# Patient Record
Sex: FEMALE | Race: WHITE | NOT HISPANIC OR LATINO | Employment: FULL TIME | URBAN - METROPOLITAN AREA
[De-identification: names, ages, dates, MRNs, and addresses within clinical notes are randomized per-mention and may not be internally consistent; named-entity substitution may affect disease eponyms.]

---

## 2017-03-31 RX ORDER — NYSTATIN 100000 [USP'U]/G
POWDER TOPICAL 3 TIMES DAILY PRN
Qty: 30 G | Refills: 2 | Status: SHIPPED | OUTPATIENT
Start: 2017-03-31 | End: 2017-03-31

## 2017-03-31 RX ORDER — NYSTATIN 100000 [USP'U]/G
POWDER TOPICAL 3 TIMES DAILY PRN
Qty: 30 G | Refills: 0 | Status: SHIPPED | OUTPATIENT
Start: 2017-03-31 | End: 2019-11-11

## 2017-05-22 RX ORDER — AZITHROMYCIN 250 MG/1
TABLET, FILM COATED ORAL
Qty: 6 TABLET | Refills: 0 | Status: SHIPPED | OUTPATIENT
Start: 2017-05-22 | End: 2017-08-18

## 2017-07-06 ENCOUNTER — LAB (OUTPATIENT)
Dept: LAB | Facility: HOSPITAL | Age: 27
End: 2017-07-06

## 2017-07-06 DIAGNOSIS — N92.6 MISSED PERIOD: ICD-10-CM

## 2017-07-06 DIAGNOSIS — N92.6 MISSED PERIOD: Primary | ICD-10-CM

## 2017-07-06 LAB — HCG INTACT+B SERPL-ACNC: <5 MIU/ML

## 2017-07-06 PROCEDURE — 84702 CHORIONIC GONADOTROPIN TEST: CPT | Performed by: NURSE PRACTITIONER

## 2017-07-06 PROCEDURE — 36415 COLL VENOUS BLD VENIPUNCTURE: CPT

## 2017-08-08 RX ORDER — FLUCONAZOLE 150 MG/1
150 TABLET ORAL
Qty: 2 TABLET | Refills: 3 | Status: SHIPPED | OUTPATIENT
Start: 2017-08-08 | End: 2017-08-18

## 2017-08-18 ENCOUNTER — OFFICE VISIT (OUTPATIENT)
Dept: INTERNAL MEDICINE | Facility: CLINIC | Age: 27
End: 2017-08-18

## 2017-08-18 VITALS
SYSTOLIC BLOOD PRESSURE: 130 MMHG | DIASTOLIC BLOOD PRESSURE: 90 MMHG | BODY MASS INDEX: 41.66 KG/M2 | WEIGHT: 244 LBS | RESPIRATION RATE: 12 BRPM | HEART RATE: 88 BPM | HEIGHT: 64 IN

## 2017-08-18 DIAGNOSIS — G57.01 PIRIFORMIS SYNDROME OF RIGHT SIDE: Primary | ICD-10-CM

## 2017-08-18 DIAGNOSIS — E65 BUFFALO HUMP: ICD-10-CM

## 2017-08-18 DIAGNOSIS — L90.6 SKIN STRIAE: ICD-10-CM

## 2017-08-18 DIAGNOSIS — M46.1 SI (SACROILIAC) JOINT INFLAMMATION (HCC): ICD-10-CM

## 2017-08-18 DIAGNOSIS — E28.2 PCOS (POLYCYSTIC OVARIAN SYNDROME): ICD-10-CM

## 2017-08-18 PROCEDURE — 99214 OFFICE O/P EST MOD 30 MIN: CPT | Performed by: FAMILY MEDICINE

## 2017-08-18 RX ORDER — DEXAMETHASONE 1 MG
1 TABLET ORAL ONCE
Qty: 1 TABLET | Refills: 0 | Status: SHIPPED | OUTPATIENT
Start: 2017-08-18 | End: 2017-08-19

## 2017-08-21 ENCOUNTER — LAB (OUTPATIENT)
Dept: LAB | Facility: HOSPITAL | Age: 27
End: 2017-08-21

## 2017-08-21 DIAGNOSIS — E65 BUFFALO HUMP: ICD-10-CM

## 2017-08-21 DIAGNOSIS — L90.6 SKIN STRIAE: ICD-10-CM

## 2017-08-21 LAB — CORTIS AM PEAK SERPL-MCNC: 0.7 MCG/DL (ref 4.3–22.4)

## 2017-08-21 PROCEDURE — 82533 TOTAL CORTISOL: CPT | Performed by: FAMILY MEDICINE

## 2017-08-21 PROCEDURE — 36415 COLL VENOUS BLD VENIPUNCTURE: CPT

## 2017-09-06 ENCOUNTER — LAB (OUTPATIENT)
Dept: LAB | Facility: HOSPITAL | Age: 27
End: 2017-09-06

## 2017-09-06 DIAGNOSIS — E28.2 PCOS (POLYCYSTIC OVARIAN SYNDROME): ICD-10-CM

## 2017-09-06 LAB
25(OH)D3 SERPL-MCNC: 18.8 NG/ML
ALBUMIN SERPL-MCNC: 4.3 G/DL (ref 3.2–4.8)
ALBUMIN/GLOB SERPL: 1.2 G/DL (ref 1.5–2.5)
ALP SERPL-CCNC: 85 U/L (ref 25–100)
ALT SERPL W P-5'-P-CCNC: 27 U/L (ref 7–40)
ANION GAP SERPL CALCULATED.3IONS-SCNC: 6 MMOL/L (ref 3–11)
AST SERPL-CCNC: 18 U/L (ref 0–33)
BILIRUB SERPL-MCNC: 0.4 MG/DL (ref 0.3–1.2)
BUN BLD-MCNC: 14 MG/DL (ref 9–23)
BUN/CREAT SERPL: 23.3 (ref 7–25)
CALCIUM SPEC-SCNC: 9.7 MG/DL (ref 8.7–10.4)
CHLORIDE SERPL-SCNC: 102 MMOL/L (ref 99–109)
CO2 SERPL-SCNC: 29 MMOL/L (ref 20–31)
CREAT BLD-MCNC: 0.6 MG/DL (ref 0.6–1.3)
GFR SERPL CREATININE-BSD FRML MDRD: 121 ML/MIN/1.73
GLOBULIN UR ELPH-MCNC: 3.5 GM/DL
GLUCOSE BLD-MCNC: 82 MG/DL (ref 70–100)
HBA1C MFR BLD: 5.2 % (ref 4.8–5.6)
POTASSIUM BLD-SCNC: 5 MMOL/L (ref 3.5–5.5)
PROLACTIN SERPL-MCNC: 6.6 NG/ML
PROT SERPL-MCNC: 7.8 G/DL (ref 5.7–8.2)
SODIUM BLD-SCNC: 137 MMOL/L (ref 132–146)
T4 SERPL-MCNC: 10.2 MCG/DL (ref 4.7–11.4)
TESTOST SERPL-MCNC: 75.44 NG/DL
TSH SERPL DL<=0.05 MIU/L-ACNC: 2.47 MIU/ML (ref 0.35–5.35)
VIT B12 BLD-MCNC: 313 PG/ML (ref 211–911)

## 2017-09-06 PROCEDURE — 80053 COMPREHEN METABOLIC PANEL: CPT | Performed by: FAMILY MEDICINE

## 2017-09-06 PROCEDURE — 84146 ASSAY OF PROLACTIN: CPT | Performed by: FAMILY MEDICINE

## 2017-09-06 PROCEDURE — 83036 HEMOGLOBIN GLYCOSYLATED A1C: CPT | Performed by: FAMILY MEDICINE

## 2017-09-06 PROCEDURE — 82607 VITAMIN B-12: CPT | Performed by: FAMILY MEDICINE

## 2017-09-06 PROCEDURE — 84403 ASSAY OF TOTAL TESTOSTERONE: CPT | Performed by: FAMILY MEDICINE

## 2017-09-06 PROCEDURE — 84481 FREE ASSAY (FT-3): CPT | Performed by: FAMILY MEDICINE

## 2017-09-06 PROCEDURE — 84436 ASSAY OF TOTAL THYROXINE: CPT | Performed by: FAMILY MEDICINE

## 2017-09-06 PROCEDURE — 82306 VITAMIN D 25 HYDROXY: CPT | Performed by: FAMILY MEDICINE

## 2017-09-06 PROCEDURE — 84443 ASSAY THYROID STIM HORMONE: CPT | Performed by: FAMILY MEDICINE

## 2017-09-06 PROCEDURE — 36415 COLL VENOUS BLD VENIPUNCTURE: CPT

## 2017-09-07 LAB — T3FREE SERPL-MCNC: 3.4 PG/ML (ref 2–4.4)

## 2017-09-07 RX ORDER — ERGOCALCIFEROL 1.25 MG/1
50000 CAPSULE ORAL WEEKLY
Qty: 4 CAPSULE | Refills: 3 | Status: SHIPPED | OUTPATIENT
Start: 2017-09-07 | End: 2018-08-06 | Stop reason: SDUPTHER

## 2017-09-15 RX ORDER — ONDANSETRON 4 MG/1
4 TABLET, FILM COATED ORAL EVERY 6 HOURS PRN
Qty: 20 TABLET | Refills: 5 | Status: SHIPPED | OUTPATIENT
Start: 2017-09-15 | End: 2019-03-26 | Stop reason: SDUPTHER

## 2017-10-18 DIAGNOSIS — E28.2 PCOS (POLYCYSTIC OVARIAN SYNDROME): Primary | ICD-10-CM

## 2017-12-05 RX ORDER — ALBUTEROL SULFATE 90 UG/1
2 AEROSOL, METERED RESPIRATORY (INHALATION) EVERY 4 HOURS PRN
Qty: 18 G | Refills: 2 | Status: SHIPPED | OUTPATIENT
Start: 2017-12-05 | End: 2020-03-09

## 2017-12-05 RX ORDER — ALBUTEROL SULFATE 90 UG/1
2 AEROSOL, METERED RESPIRATORY (INHALATION) EVERY 4 HOURS PRN
Qty: 18 G | Refills: 2 | Status: SHIPPED | OUTPATIENT
Start: 2017-12-05 | End: 2017-12-05

## 2017-12-12 RX ORDER — OSELTAMIVIR PHOSPHATE 75 MG/1
75 CAPSULE ORAL 2 TIMES DAILY
Qty: 10 CAPSULE | Refills: 0 | Status: SHIPPED | OUTPATIENT
Start: 2017-12-12 | End: 2018-03-02

## 2018-01-11 RX ORDER — DESOGESTREL AND ETHINYL ESTRADIOL 0.15-0.03
1 KIT ORAL DAILY
Qty: 28 TABLET | Refills: 12 | Status: SHIPPED | OUTPATIENT
Start: 2018-01-11 | End: 2018-03-02 | Stop reason: SDDI

## 2018-03-02 ENCOUNTER — OFFICE VISIT (OUTPATIENT)
Dept: OBSTETRICS AND GYNECOLOGY | Facility: CLINIC | Age: 28
End: 2018-03-02

## 2018-03-02 ENCOUNTER — OFFICE VISIT (OUTPATIENT)
Dept: INTERNAL MEDICINE | Facility: CLINIC | Age: 28
End: 2018-03-02

## 2018-03-02 ENCOUNTER — LAB (OUTPATIENT)
Dept: LAB | Facility: HOSPITAL | Age: 28
End: 2018-03-02

## 2018-03-02 VITALS
OXYGEN SATURATION: 96 % | BODY MASS INDEX: 41.59 KG/M2 | SYSTOLIC BLOOD PRESSURE: 112 MMHG | HEART RATE: 93 BPM | DIASTOLIC BLOOD PRESSURE: 80 MMHG | HEIGHT: 64 IN | WEIGHT: 243.6 LBS | RESPIRATION RATE: 12 BRPM

## 2018-03-02 VITALS
HEART RATE: 86 BPM | DIASTOLIC BLOOD PRESSURE: 74 MMHG | BODY MASS INDEX: 41.48 KG/M2 | HEIGHT: 64 IN | SYSTOLIC BLOOD PRESSURE: 112 MMHG | WEIGHT: 243 LBS | RESPIRATION RATE: 14 BRPM | OXYGEN SATURATION: 98 %

## 2018-03-02 DIAGNOSIS — E28.2 PCOS (POLYCYSTIC OVARIAN SYNDROME): ICD-10-CM

## 2018-03-02 DIAGNOSIS — Z01.419 WELL WOMAN EXAM WITH ROUTINE GYNECOLOGICAL EXAM: Primary | ICD-10-CM

## 2018-03-02 DIAGNOSIS — R79.89 ELEVATED TESTOSTERONE LEVEL IN FEMALE: ICD-10-CM

## 2018-03-02 DIAGNOSIS — E28.2 PCOS (POLYCYSTIC OVARIAN SYNDROME): Primary | ICD-10-CM

## 2018-03-02 DIAGNOSIS — M54.17 LUMBOSACRAL RADICULOPATHY AT L4: ICD-10-CM

## 2018-03-02 DIAGNOSIS — R10.32 LLQ PAIN: ICD-10-CM

## 2018-03-02 LAB
ESTRADIOL SERPL HS-MCNC: 93 PG/ML
FSH SERPL-ACNC: 3 MIU/ML
HCG INTACT+B SERPL-ACNC: <5 MIU/ML
LH SERPL-ACNC: 7.5 MIU/ML
PROLACTIN SERPL-MCNC: 6.7 NG/ML

## 2018-03-02 PROCEDURE — 84146 ASSAY OF PROLACTIN: CPT

## 2018-03-02 PROCEDURE — 82670 ASSAY OF TOTAL ESTRADIOL: CPT

## 2018-03-02 PROCEDURE — 82627 DEHYDROEPIANDROSTERONE: CPT

## 2018-03-02 PROCEDURE — 36415 COLL VENOUS BLD VENIPUNCTURE: CPT

## 2018-03-02 PROCEDURE — 83001 ASSAY OF GONADOTROPIN (FSH): CPT

## 2018-03-02 PROCEDURE — 99395 PREV VISIT EST AGE 18-39: CPT | Performed by: NURSE PRACTITIONER

## 2018-03-02 PROCEDURE — 99213 OFFICE O/P EST LOW 20 MIN: CPT | Performed by: FAMILY MEDICINE

## 2018-03-02 PROCEDURE — 84702 CHORIONIC GONADOTROPIN TEST: CPT

## 2018-03-02 PROCEDURE — 84403 ASSAY OF TOTAL TESTOSTERONE: CPT

## 2018-03-02 PROCEDURE — 84402 ASSAY OF FREE TESTOSTERONE: CPT

## 2018-03-02 PROCEDURE — 83002 ASSAY OF GONADOTROPIN (LH): CPT

## 2018-03-02 RX ORDER — SPIRONOLACTONE 50 MG/1
TABLET, FILM COATED ORAL
Qty: 30 TABLET | Refills: 2 | Status: SHIPPED | OUTPATIENT
Start: 2018-03-02 | End: 2019-01-23 | Stop reason: SDUPTHER

## 2018-03-02 RX ORDER — METFORMIN HYDROCHLORIDE 500 MG/1
1000 TABLET, EXTENDED RELEASE ORAL
Qty: 180 TABLET | Refills: 1 | Status: SHIPPED | OUTPATIENT
Start: 2018-03-02 | End: 2019-03-08 | Stop reason: SDUPTHER

## 2018-03-02 NOTE — PROGRESS NOTES
Follow up visit. Still having nerve pain in legs.   Follow up cortisol.     SUBJECTIVE: Taylor Rivas is a 27 y.o. female seen for a follow up visit;    Back pain: worsening, not improved w/ stretches, chiropractor.  She is still having the back pain, but now the pain is more specifically running down legs.  Pain runs down outside of thighs, across top of thighs to inner calfs.  She is also having numbness and tingling of her big toes.     PCOS: not improved, couldn't tolerate the short acting metformin.         The following portions of the patient's history were reviewed and updated as appropriate: She  has a past medical history of Chronic cholecystitis and PCOS (polycystic ovarian syndrome).  She has PCOS (polycystic ovarian syndrome) on her pertinent problem list.  She  has a past surgical history that includes Laparoscopic cholecystectomy and [place] tooth extraction.  Her family history includes Bone cancer (age of onset: 50) in her paternal aunt; Breast cancer in her other; Heart valve disorder in her father; Hyperlipidemia in her maternal grandfather; Hypertension in her maternal grandmother; Lymphoma (age of onset: 65) in her paternal grandfather; No Known Problems in her brother and paternal grandmother; Stomach cancer in her other; Stroke in her maternal grandmother; Thyroid disease in her mother.  She  reports that she quit smoking about 4 years ago. Her smoking use included Cigarettes. She started smoking about 10 years ago. She has a 6.00 pack-year smoking history. She has never used smokeless tobacco. She reports that she drinks alcohol. She reports that she does not use drugs.  She has a current medication list which includes the following prescription(s): albuterol, nystatin, ondansetron, vitamin d, medroxyprogesterone, metformin er, and spironolactone..    Review of Systems   Constitutional: Positive for fatigue.   Respiratory: Negative.    Cardiovascular: Negative.    Musculoskeletal: Positive  "for back pain.   Neurological: Positive for numbness.        B//outer big toe numbness, inner left thigh numbness, pain shooting down back of legs   Psychiatric/Behavioral: Negative.          OBJECTIVE:  /80   Pulse 93   Resp 12   Ht 162.6 cm (64\")   Wt 110 kg (243 lb 9.6 oz)   LMP 01/04/2018 (Exact Date)   SpO2 96%   BMI 41.81 kg/m²      Physical Exam   Constitutional: She appears well-developed and well-nourished. No distress.        Neurological: She exhibits normal muscle tone.   Reflex Scores:       Patellar reflexes are 2+ on the right side and 2+ on the left side.       Achilles reflexes are 2+ on the right side and 2+ on the left side.          ASSESSMENT:   Diagnosis Plan   1. PCOS (polycystic ovarian syndrome)  metFORMIN ER (GLUCOPHAGE-XR) 500 MG 24 hr tablet    spironolactone (ALDACTONE) 50 MG tablet   2. Elevated testosterone level in female  spironolactone (ALDACTONE) 50 MG tablet   3. Lumbosacral radiculopathy at L4  MRI Lumbar Spine Without Contrast     Medications Discontinued During This Encounter   Medication Reason   • metFORMIN (GLUCOPHAGE) 500 MG tablet                I, Belem Yanez MD, hereby attest that the medical record entry above accurately reflects signatures/notations that I made in my capacity as Belem Yanez MD when I treated and diagnosed the above patient.  I do hereby attest that his information is true, accurate, and complete to the best of my knowledge and I understand that any falsification, omission, or concealment of material fact may subject me to administrative, civil, or criminal liability.                  "

## 2018-03-03 LAB — DHEA-S SERPL-MCNC: 202.1 UG/DL (ref 84.8–378)

## 2018-03-04 LAB
TESTOST FREE SERPL-MCNC: 3.6 PG/ML (ref 0–4.2)
TESTOST SERPL-MCNC: 80 NG/DL (ref 8–48)

## 2018-03-09 NOTE — PROGRESS NOTES
[facility] ANNUAL WELL WOMAN VISIT      Taylor Rivas  [id number]  1990      Chief Complaint: Gynecologic Exam (Lower abdominal pain, very tender)        History of present illness:    Taylor Rivas, is a 27 y.o. year old  presenting to be seen for her annual exam. She has known PCOS, previously managed with OCPs, spironolactone, and metformin. She was on only OCPs for the last 1 year. She reports discontinuing her OCPs in 2017 as she is desiring of pregnancy. She has resumed her Metformin and see her PCP this afternoon to discuss possibly resuming her spironolactone. She has not has a menstrual bleed since stopping her OCPs. She has used cyclic progesterone in the past and is considering returning to this therapy to induce menses. All home UPTs have been negative. She currently has bothersome cystic acne and c/o difficulty losing weight without medication.   Additionally, Alyssa reports lower abdominal tenderness that has been present x 1-2 weeks. She has intermittent generalized lower abdominal pain and intercourse is painful in certain positions. Denies dysuria, hematuria, or fevers. She is s/p cholecystectomy.     SEXUAL Hx:  She is currently sexually active.  In the past year there has not been new sexual partners.    Condoms are not typically used.  She would not like to be screened for STD's at today's exam.  Current birth control method: not using any form of contraception because she is currently trying to conceive.  She is happy with her current method of contraception and does not want to discuss alternative methods of contraception.  MENSTRUAL Hx:  Patient's last menstrual period was 2018 (exact date). No menses since discontinuing OCPs.   In the past 6 months her cycles have been regular, absent off of hormone management.   Her menstrual flow has been absent.   Each month on average there are roughly 0 days of very heavy flow.    Intermenstrual bleeding is  absent.    Post-coital bleeding is absent.  Dysmenorrhea: is not affecting her activities of daily living  PMS: is not affecting her activities of daily living  Her cycles ARE a source of concern for her that she wishes to discuss today.  HEALTH Hx:  She exercises regularly: no (but is planning to start exercising more ).  She wears her seat belt:yes.  She has concerns about domestic violence: no.  She is a non-smoker.      Past Medical History:   Diagnosis Date   • Chronic cholecystitis    • PCOS (polycystic ovarian syndrome)        Past Surgical History:   Procedure Laterality Date   • LAPAROSCOPIC CHOLECYSTECTOMY     • WISDOM TOOTH EXTRACTION         MEDICATIONS: The current medication list was reviewed and reconciled.     Allergies:  has No Known Allergies.    Family History   Problem Relation Age of Onset   • Thyroid disease Mother    • Hypertension Maternal Grandmother    • Stroke Maternal Grandmother    • Lymphoma Paternal Grandfather 65     [place] Lymphoma   • Breast cancer Other    • Stomach cancer Other    • Heart valve disorder Father    • No Known Problems Brother    • Bone cancer Paternal Aunt 50     osteosarcoma?   • Hyperlipidemia Maternal Grandfather    • No Known Problems Paternal Grandmother        Health Maintenance:  Last pap smear was 12/2016, results were  normal PAP.. She  does not have a history of abnormal pap smears.    Review of Systems   Constitutional: Negative for fatigue, fever and unexpected weight change.   HENT: Negative for congestion, ear pain, hearing loss, sinus pressure and trouble swallowing.    Eyes: Negative for visual disturbance.   Respiratory: Negative for cough, chest tightness, shortness of breath and wheezing.    Cardiovascular: Negative for chest pain, palpitations and leg swelling.   Gastrointestinal: Positive for abdominal pain (low). Negative for abdominal distention, constipation, diarrhea, nausea and vomiting.   Endocrine: Negative for cold intolerance, heat  "intolerance, polydipsia, polyphagia and polyuria.   Genitourinary: Positive for dyspareunia. Negative for difficulty urinating, dysuria, frequency, hematuria, pelvic pain, urgency, vaginal bleeding, vaginal discharge and vaginal pain.   Musculoskeletal: Negative for arthralgias, gait problem, joint swelling and myalgias.   Skin: Negative for color change, pallor and rash.   Neurological: Negative for dizziness, seizures, syncope, weakness, light-headedness, numbness and headaches.   Hematological: Negative for adenopathy. Does not bruise/bleed easily.   Psychiatric/Behavioral: Negative for agitation, confusion, sleep disturbance and suicidal ideas. The patient is not nervous/anxious.        Physical Exam  Vital Signs: /74  Pulse 86  Resp 14  Ht 162.6 cm (64\")  Wt 110 kg (243 lb)  LMP 01/04/2018 (Exact Date)  SpO2 98%  Breastfeeding? No  BMI 41.71 kg/m2   General Appearance:  alert, cooperative, no apparent distress, appears stated age and obese   Neurologic/Psychiatric: A&O x 3, gait steady, appropriate affect   HEENT:  Normocephalic, without obvious abnormality, mucous membranes moist   Neck: Supple, symmetrical, trachea midline, no adenopathy;  No thyromegaly, masses, or tenderness   Back:   Symmetric, no curvature, ROM normal, no CVA tenderness   Lungs:   Clear to auscultation bilaterally; respirations regular, even, and unlabored bilaterally   Heart:  Regular rate and rhythm, no murmurs appreciated   Breasts:  Symmetrical, no masses, no lesions and no nipple discharge   Abdomen:   Soft, non-tender, no organomegaly, tenderness in RLQ and LLQ and Exam limited d/t habitus.   Lymph nodes: No cervical, supraclavicular, inguinal or axillary adenopathy noted   Extremities: Normal, atraumatic; no clubbing, cyanosis, or edema    Skin: No rashes, ulcers, or suspicious lesions noted   Pelvic: External Genitalia  without lesions or skin changes  Vagina  is pink, moist, without lesions.   Cervix  normal, " without lesions, no discharge, no CMT and pap smear obtained  Uterus  normal size, midposition, mobile and nontender  Ovaries  without palpable masses or fullness  Parametria  smooth  Rectovaginal  Female rectovaginal: deferred       Procedure Note:  No notes on file    Assessment and Plan:    Taylor was seen today for gynecologic exam.    Diagnoses and all orders for this visit:    Well woman exam with routine gynecological exam  -     Pap IG, Rfx HPV ASCU; Future  -     Pap IG, Rfx HPV ASCU    PCOS (polycystic ovarian syndrome)  -     Testosterone, Free, Total; Future  -     Follicle Stimulating Hormone; Future  -     Estradiol; Future  -     Prolactin; Future  -     Luteinizing Hormone; Future  -     hCG, Quantitative, Pregnancy; Future  -     DHEA-Sulfate; Future    LLQ pain  -     US Non-ob Transvaginal; Future        Pap was done today.  If she does not receive the results of the Pap within 2 weeks  time, she was instructed to call to find out the results.  I explained to Taylor that the recommendations for Pap smear interval in a low risk patient's has lengthened to 3 years time.  I encouraged her to be seen yearly for a full physical exam including breast and pelvic exam even during the off years when PAP's will not be performed.    She was recommended to begin mammograms at age 40 for breast cancer screening, colonoscopy at age 50 for colon cancer screening and bone density scans (DEXA) at age 60-65 for osteoporosis screening.    Alyssa and I discussed her h/o PCOS and desiring fertility. She has not has a period since stopping her OCPs and has had to use hormonal managements to induce menses in the past. I fear she is likely anovulatory and may need ongoing close PCOS management and possible higher level fertility care in the future. We will assess blood work now and discuss next steps. She was encouraged to continue Metformin and work toward weight loss. She has been successful in the past with  diet and exercise, reports desire to work toward long-term weight loss.     Pelvic exam WNL today, but low generalized abdominal tenderness noted consistent with her report of low abd pain. We will obtain TVUS at her convenience in 1-2 weeks and I will notify her of all results.       Follow-up will be determined pending u/s and blood work results.       MACIEJ Escamilla      Note: Speech recognition transcription software was used to dictate portions of this document.  An attempt at proofreading has been made though minor errors in transcription may still be present.  Please do not hesitate to call our office with any questions.

## 2018-03-15 RX ORDER — MEDROXYPROGESTERONE ACETATE 10 MG/1
10 TABLET ORAL DAILY
Qty: 10 TABLET | Refills: 12 | Status: SHIPPED | OUTPATIENT
Start: 2018-03-15 | End: 2019-03-26 | Stop reason: SDUPTHER

## 2018-03-15 NOTE — TELEPHONE ENCOUNTER
Notified of TVUS results. Discussed plan for cyclic medroxyprogesterone to encourage monthly withdrawal bleeding while she is using her metformin, spironolactone, and working toward weight loss. Pt aware of medication instructions. We reviewed medication instructions, risks, benefits, and potential side effects. Rx sent to pharmacy.

## 2018-03-29 ENCOUNTER — HOSPITAL ENCOUNTER (OUTPATIENT)
Dept: MRI IMAGING | Facility: HOSPITAL | Age: 28
Discharge: HOME | End: 2018-03-29
Admitting: FAMILY MEDICINE

## 2018-03-29 DIAGNOSIS — M54.17 LUMBOSACRAL RADICULOPATHY AT L4: ICD-10-CM

## 2018-03-29 PROCEDURE — 72148 MRI LUMBAR SPINE W/O DYE: CPT

## 2018-03-30 ENCOUNTER — TELEPHONE (OUTPATIENT)
Dept: INTERNAL MEDICINE | Facility: CLINIC | Age: 28
End: 2018-03-30

## 2018-03-30 DIAGNOSIS — M54.17 LUMBOSACRAL RADICULOPATHY AT S1: ICD-10-CM

## 2018-03-30 DIAGNOSIS — M51.27 HERNIATED NUCLEUS PULPOSUS, L5-S1: ICD-10-CM

## 2018-03-30 DIAGNOSIS — S34.22XA INJURY OF SPINAL NERVE ROOT AT S1 LEVEL, INITIAL ENCOUNTER: Primary | ICD-10-CM

## 2018-03-30 RX ORDER — GABAPENTIN 800 MG/1
TABLET ORAL
Qty: 60 TABLET | Refills: 1 | OUTPATIENT
Start: 2018-03-30 | End: 2018-06-07

## 2018-03-30 NOTE — TELEPHONE ENCOUNTER
Discussed MRI results w/ patient - will refer to [facility] per patient preference. Also discussed worsening pain - calling in gabapentin to take as needed.

## 2018-04-24 ENCOUNTER — OFFICE VISIT (OUTPATIENT)
Dept: NEUROSURGERY | Facility: CLINIC | Age: 28
End: 2018-04-24

## 2018-04-24 VITALS — HEIGHT: 64 IN

## 2018-04-24 DIAGNOSIS — M51.26 HNP (HERNIATED NUCLEUS PULPOSUS), LUMBAR: Primary | ICD-10-CM

## 2018-04-24 PROCEDURE — 99243 OFF/OP CNSLTJ NEW/EST LOW 30: CPT | Performed by: NEUROLOGICAL SURGERY

## 2018-04-24 NOTE — PROGRESS NOTES
Subjective   Patient ID: Taylor Rivas is a 27 y.o. female is being seen for consultation today at the request of No ref. 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  She has a history of back pain for 6-8 months with pain or numbness radiating to her legs at times.  Gabapentin seems to help with symptoms.  Sitting for long periods aggravates the symptoms.  She has had no therapy up until this point.  She is continued to work.  She has no symptoms referable to bladder or bowel, or sacral sensory distribution and saddle region.    Review of Radiographic Studies:  Lumbar MRI scan dated 3/20/1918 shows a central disc herniation at L5-S1 which was moderately large in size and somewhat compresses the S1 nerve roots on each side, but without an extremely significant compression or lateral recess stenosis.    The following portions of the patient's history were reviewed, updated as appropriate and approved: allergies, current medications, past family history, past medical history, past social history, past surgical history, review of systems and problem list.  Review of Systems   Constitutional: Negative for activity change, appetite change, chills, diaphoresis, fatigue, fever and unexpected weight change.   HENT: Negative for congestion, dental problem, drooling, ear discharge, ear pain, facial swelling, hearing loss, mouth sores, nosebleeds, postnasal drip, rhinorrhea, sinus pressure, sneezing, sore throat, tinnitus, trouble swallowing and voice change.    Eyes: Negative for photophobia, pain, discharge, redness, itching and visual disturbance.   Respiratory: Negative for apnea, cough, choking, chest tightness, shortness of breath, wheezing and stridor.    Cardiovascular: Negative for chest pain, palpitations and leg swelling.   Gastrointestinal: Negative  for abdominal distention, abdominal pain, anal bleeding, blood in stool, constipation, diarrhea, nausea, rectal pain and vomiting.   Endocrine: Negative for cold intolerance, heat intolerance, polydipsia, polyphagia and polyuria.   Genitourinary: Negative for decreased urine volume, difficulty urinating, dysuria, enuresis, flank pain, frequency, genital sores, hematuria and urgency.   Musculoskeletal: Positive for back pain. Negative for arthralgias, gait problem, joint swelling, myalgias, neck pain and neck stiffness.   Skin: Negative for color change, pallor, rash and wound.   Allergic/Immunologic: Negative for environmental allergies, food allergies and immunocompromised state.   Neurological: Positive for weakness and numbness. Negative for dizziness, tremors, seizures, syncope, facial asymmetry, speech difficulty, light-headedness and headaches.   Hematological: Negative for adenopathy. Does not bruise/bleed easily.   Psychiatric/Behavioral: Negative for agitation, behavioral problems, confusion, decreased concentration, dysphoric mood, hallucinations, self-injury, sleep disturbance and suicidal ideas. The patient is not nervous/anxious and is not hyperactive.        Objective     NEUROLOGICAL EXAMINATION:      MENTAL STATUS:  Alert and oriented.  Speech intact.  Recent and remote memory intact.      CRANIAL NERVES:  Cranial nerve II:  Visual fields are full.  Cranial nerves III, IV and VI:  PERRLADC.  Extraocular movements are intact.  Nystagmus is not present.  Cranial nerve V:  Facial sensation is intact.  Cranial nerve VII:  Muscles of facial expression reveal no asymmetry.  Cranial nerve VIII:  Hearing is intact.  Cranial nerves IX and X:  Palate elevates symmetrically.  Cranial nerve XI:  Shoulder shrug is intact.  Cranial nerve XII:  Tongue is midline without evidence of atrophy or fasciculation.    MUSCULOSKELETAL:  SLR negative. Shukri's test negative.    MOTOR:  Deltoid, biceps, triceps, and   strength intact.  No hand atrophy.  Hip flexion, knee extension, ankle dorsiflexion and plantar flexion intact. No tremor, spasticity, ataxia, or dysmetria.    SENSATION: Intact to touch upper and lower extremities.  Position sense intact.    REFLEXES:  DTR Intact and symmetrical in upper and lower extremities. Negative Babinski bilaterally    Assessment   Central lumbar disc herniation L5-S1 with mild bilateral radiculopathy.       Plan   Physical therapy would probably be the best choice.  There is no compelling reason to require surgery at this time, and it is uncertain that surgical removal of the herniated central fragment would relieve the pain symptoms in the back.  Recommend physical therapy and follow-up in [place] in 2 months.       Saúl Burgos MD

## 2018-05-04 RX ORDER — AMOXICILLIN AND CLAVULANATE POTASSIUM 875; 125 MG/1; MG/1
1 TABLET, FILM COATED ORAL EVERY 12 HOURS SCHEDULED
Qty: 14 TABLET | Refills: 0 | Status: SHIPPED | OUTPATIENT
Start: 2018-05-04 | End: 2018-11-09

## 2018-05-07 ENCOUNTER — HOSPITAL ENCOUNTER (OUTPATIENT)
Dept: PHYSICAL THERAPY | Facility: HOSPITAL | Age: 28
Setting detail: THERAPIES SERIES
Discharge: HOME | End: 2018-05-07

## 2018-05-07 DIAGNOSIS — M51.26 HNP (HERNIATED NUCLEUS PULPOSUS), LUMBAR: ICD-10-CM

## 2018-05-07 DIAGNOSIS — G89.29 CHRONIC BILATERAL LOW BACK PAIN WITH BILATERAL SCIATICA: Primary | ICD-10-CM

## 2018-05-07 DIAGNOSIS — M54.41 CHRONIC BILATERAL LOW BACK PAIN WITH BILATERAL SCIATICA: Primary | ICD-10-CM

## 2018-05-07 DIAGNOSIS — M54.42 CHRONIC BILATERAL LOW BACK PAIN WITH BILATERAL SCIATICA: Primary | ICD-10-CM

## 2018-05-07 PROCEDURE — 97161 PT EVAL LOW COMPLEX 20 MIN: CPT

## 2018-05-07 NOTE — THERAPY EVALUATION
Outpatient Physical Therapy Ortho Initial Evaluation  [facility]     Patient Name: Taylor Rivas  : 1990  MRN: [id number]  Today's Date: 2018      Visit Date: 2018    Patient Active Problem List   Diagnosis   • PCOS (polycystic ovarian syndrome)   • HNP (herniated nucleus pulposus), lumbar L5-S1        Past Medical History:   Diagnosis Date   • Chronic cholecystitis    • PCOS (polycystic ovarian syndrome)         Past Surgical History:   Procedure Laterality Date   • LAPAROSCOPIC CHOLECYSTECTOMY     • WISDOM TOOTH EXTRACTION         Visit Dx:     ICD-10-CM ICD-9-CM   1. Chronic bilateral low back pain with bilateral sciatica M54.42 724.2    M54.41 724.3    G89.29 338.29   2. HNP (herniated nucleus pulposus), lumbar L5-S1 M51.26 722.10             Patient History     Row Name 18 0730             History    Chief Complaint Difficulty with daily activities;Numbness;Pain;Muscle tenderness;Joint stiffness;Tinglings  -MM      Type of Pain Back pain  -MM      Date Current Problem(s) Began 17  -MM      Brief Description of Current Complaint Client presents with back pain and radicular pain into her legs.  Symptoms reportedly started 6-8 months ago after a 12 hour trip to [place].  She notices back stiffness and pain in the right leg. She experiences constant numbness in her left leg. she has numbness in both great toes.   -MM      Patient/Caregiver Goals Relieve pain;Improve mobility;Improve strength;Return to prior level of function  -MM      Occupation/sports/leisure activities [facility]  -MM      What clinical tests have you had for this problem? MRI  -MM      Results of Clinical Tests central disc herniation L5/S1  -MM      Are you or can you be pregnant No  -MM         Pain     Pain Location Back;Leg  -MM      Pain at Present 2  -MM      Pain at Best 2  -MM      Pain at Worst 6;7  -MM      Pain Frequency Constant/continuous  -MM      Pain  Description Sharp;Shooting;Cramping  -MM      Pain Comments Pain is worse with prolonged sitting. she also notices sharp pain often when transferring sit to stand.   -MM      Is your sleep disturbed? No  -MM      Difficulties with ADL's? prolonged sitting, transfers sit to stand, stairs, picking up items, carrying items, dressing  -MM         Fall Risk Assessment    Any falls in the past year: No  -MM         Daily Activities    Primary Language English  -MM      Are you able to read Yes  -MM      Are you able to write Yes  -MM      How does patient learn best? Listening;Reading;Demonstration  -MM      Barriers to learning None  -MM      Pt Participated in POC and Goals Yes  -MM         Safety    Are you being hurt, hit, or frightened by anyone at home or in your life? No  -MM      Are you being neglected by a caregiver No  -MM        User Key  (r) = Recorded By, (t) = Taken By, (c) = Cosigned By    Initials Name Provider Type    MM Patel Lopez, PT Physical Therapist                PT Ortho     Row Name 05/07/18 5120       Precautions and Contraindications    Precautions MRI consistent with central disc herniation L5/S1  -MM       Posture/Observations    Posture/Observations Comments No signs of scoliosis. Pelvic alignment is symmetrical.  Palpation: Marked tenderness central lumbosacral region, superior gluteal region bilateral and right gluteal region and right hamstrings.  -MM       Neural Tension Signs- Lower Quarter Clearing    SLR Bilateral:;Positive  -MM       Lumbar ROM Screen- Lower Quarter Clearing    Lumbar Flexion Impaired   75% with some increased pain in the right leg and back pain  -MM    Lumbar Extension Impaired   33° with central pain on the left  -MM    Lumbar Lateral Flexion Impaired   Right: 25° with pain; left 28°  -MM    Lumbar Rotation Impaired   52° bilateral  -MM    Lumbar Quadrant  Impaired   Pain bilateral; left greater than right  -MM       General ROM    GENERAL ROM COMMENTS Client  has some difficulty lying supine.  Some increased pain with lower trunk rotation.  Client tolerates bridging okay.  Client is increased pain with single knee to chest.  She tolerates propped on elbows and press ups well.  Directional preference for extension.  -MM       General Assessment (Manual Muscle Testing)    Comment, General Manual Muscle Testing (MMT) Assessment Bilateral knee strength is within normal limits.  Bilateral ankle strength is within normal limits.  Bilateral hip strength is within normal limits except hip extension is 4+/5 bilateral  -MM      User Key  (r) = Recorded By, (t) = Taken By, (c) = Cosigned By    Initials Name Provider Type    LAWANDA Lopez, PT Physical Therapist      \          PT OP Goals     Row Name 05/07/18 0730          PT Short Term Goals    STG Date to Achieve 05/28/18  -MM     STG 1 Client will report 80% improvement in low back pain.  -MM     STG 1 Progress New  -MM     STG 2 Radicular right leg pain will resolve.  -MM     STG 2 Progress New  -MM     STG 3 Lower lumbar tenderness will resolve.  -MM     STG 3 Progress New  -MM     STG 4 Client will be independent with home program to minimize pain and improve overall mobility and function.  -MM     STG 4 Progress New  -MM        Long Term Goals    LTG Date to Achieve 06/04/18  -MM     LTG 1 Modified Oswestry score will improve to 16% or better.  -MM     LTG 1 Progress New  -MM     LTG 2 Client will improve to picking up light items from the floor without difficulty.  -MM     LTG 2 Progress New  -MM     LTG 3 Bilateral hip extension strength will improve to 5/5.  -MM     LTG 3 Progress New  -MM        Time Calculation    PT Goal Re-Cert Due Date 08/05/18  -MM       User Key  (r) = Recorded By, (t) = Taken By, (c) = Cosigned By    Initials Name Provider Type    LAWANDA Lopez, PT Physical Therapist                PT Assessment/Plan     Row Name 05/07/18 0730          PT Assessment    Functional Limitations Limitation  in home management;Limitations in community activities;Limitations in functional capacity and performance;Performance in leisure activities;Performance in self-care ADL  -MM     Impairments Pain;Muscle strength;Range of motion  -MM     Assessment Comments Client presented with evolving symptoms of low complexity.  Signs and symptoms are consistent with referring diagnosis of low back pain related to herniated disc.  Client has a directional preference for flexion.  She does have pain in both directions.  She should respond well to mobility exercises in small ranges and low level stabilization.  Skilled intervention is required to minimize pain and improve overall mobility.  -MM     Please refer to paper survey for additional self-reported information Yes  -MM     Rehab Potential Good  -MM     Patient/caregiver participated in establishment of treatment plan and goals Yes  -MM     Patient would benefit from skilled therapy intervention Yes  -MM        PT Plan    PT Frequency 2x/week  -MM     Predicted Duration of Therapy Intervention (OT Eval) 8-10 visits  -MM     Planned CPT's? PT EVAL LOW COMPLEXITY: 71323;PT THER PROC EA 15 MIN: 93248;PT MANUAL THERAPY EA 15 MIN: 49887;PT ELECTRICAL STIM UNATTEND: ;PT ULTRASOUND EA 15 MIN: 08660;PT TRACTION LUMBAR: 99399;PT HOT/COLD PACK WC NONMCARE: 35714  -MM     PT Plan Comments Continue per plan of treatment with exercises and ASTYM.  -MM       User Key  (r) = Recorded By, (t) = Taken By, (c) = Cosigned By    Initials Name Provider Type    MM Patel Lopez, PT Physical Therapist        Outcome Measure Options: Modifed Owestry  Modified Oswestry  Modified Oswestry Score/Comments: 32%      Time Calculation:   Start Time: 0730     Therapy Charges for Today     Code Description Service Date Service Provider Modifiers Qty    [id number]  PT EVAL LOW COMPLEXITY 4 5/7/2018 Patel Lopez, PT GP 1          PT G-Codes  Outcome Measure Options: Modifed Owestry          Patel Lopez, PT  5/7/2018

## 2018-05-09 ENCOUNTER — HOSPITAL ENCOUNTER (OUTPATIENT)
Dept: PHYSICAL THERAPY | Facility: HOSPITAL | Age: 28
Setting detail: THERAPIES SERIES
Discharge: HOME | End: 2018-05-09

## 2018-05-09 DIAGNOSIS — M51.26 HNP (HERNIATED NUCLEUS PULPOSUS), LUMBAR: ICD-10-CM

## 2018-05-09 DIAGNOSIS — M54.42 CHRONIC BILATERAL LOW BACK PAIN WITH BILATERAL SCIATICA: Primary | ICD-10-CM

## 2018-05-09 DIAGNOSIS — M54.41 CHRONIC BILATERAL LOW BACK PAIN WITH BILATERAL SCIATICA: Primary | ICD-10-CM

## 2018-05-09 DIAGNOSIS — G89.29 CHRONIC BILATERAL LOW BACK PAIN WITH BILATERAL SCIATICA: Primary | ICD-10-CM

## 2018-05-09 PROCEDURE — 97012 MECHANICAL TRACTION THERAPY: CPT

## 2018-05-09 PROCEDURE — 97110 THERAPEUTIC EXERCISES: CPT

## 2018-05-09 NOTE — THERAPY TREATMENT NOTE
Outpatient Physical Therapy Ortho Treatment Note  [facility]     Patient Name: Taylor Rivas  : 1990  MRN: [id number]  Today's Date: 2018      Visit Date: 2018    Visit Dx:    ICD-10-CM ICD-9-CM   1. Chronic bilateral low back pain with bilateral sciatica M54.42 724.2    M54.41 724.3    G89.29 338.29   2. HNP (herniated nucleus pulposus), lumbar L5-S1 M51.26 722.10       Patient Active Problem List   Diagnosis   • PCOS (polycystic ovarian syndrome)   • HNP (herniated nucleus pulposus), lumbar L5-S1        Past Medical History:   Diagnosis Date   • Chronic cholecystitis    • PCOS (polycystic ovarian syndrome)         Past Surgical History:   Procedure Laterality Date   • LAPAROSCOPIC CHOLECYSTECTOMY     • WISDOM TOOTH EXTRACTION               PT Ortho     Row Name 18 0730       Precautions and Contraindications    Precautions MRI consistent with central disc herniation L5/S1  -MM       Posture/Observations    Posture/Observations Comments No signs of scoliosis. Pelvic alignment is symmetrical.  Palpation: Marked tenderness central lumbosacral region, superior gluteal region bilateral and right gluteal region and right hamstrings.  -MM       Neural Tension Signs- Lower Quarter Clearing    SLR Bilateral:;Positive  -MM       Lumbar ROM Screen- Lower Quarter Clearing    Lumbar Flexion Impaired   75% with some increased pain in the right leg and back pain  -MM    Lumbar Extension Impaired   33° with central pain on the left  -MM    Lumbar Lateral Flexion Impaired   Right: 25° with pain; left 28°  -MM    Lumbar Rotation Impaired   52° bilateral  -MM    Lumbar Quadrant  Impaired   Pain bilateral; left greater than right  -MM       General ROM    GENERAL ROM COMMENTS Client has some difficulty lying supine.  Some increased pain with lower trunk rotation.  Client tolerates bridging okay.  Client is increased pain with single knee to chest.  She tolerates propped on elbows and press ups well.   Directional preference for extension.  -MM       General Assessment (Manual Muscle Testing)    Comment, General Manual Muscle Testing (MMT) Assessment Bilateral knee strength is within normal limits.  Bilateral ankle strength is within normal limits.  Bilateral hip strength is within normal limits except hip extension is 4+/5 bilateral  -MM      User Key  (r) = Recorded By, (t) = Taken By, (c) = Cosigned By    Initials Name Provider Type    LAWANDA Lopez, PT Physical Therapist                PT Assessment/Plan     Row Name 05/09/18 1130          PT Assessment    Assessment Comments No complaints with exercises. Some relief with traction.   -MM        PT Plan    PT Plan Comments Continue per plan of treatment with exercises and check response to traction. Consider ASTYM next visit.   -MM       User Key  (r) = Recorded By, (t) = Taken By, (c) = Cosigned By    Initials Name Provider Type    LAWANDA Lopez, PT Physical Therapist                Modalities     Row Name 05/09/18 1130             Traction 30471    Traction Type Lumbar  -MM      Rx Minutes 10  -MM      Duration Intermittent  -MM      Position Hook-lying  -MM      Weight 50  -MM      Hold 60  -MM      Relax 10  -MM        User Key  (r) = Recorded By, (t) = Taken By, (c) = Cosigned By    Initials Name Provider Type    LAWANDA Lopez, PT Physical Therapist                Exercises     Row Name 05/09/18 1130             Subjective Comments    Subjective Comments Client reports mild pain at the time of treatment today. She reports that the exercises seem to be helpful.   -MM         Subjective Pain    Able to rate subjective pain? yes  -MM      Pre-Treatment Pain Level 2  -MM      Post-Treatment Pain Level 2  -MM         Exercise 1    Exercise Name 1 Included exercises in clinical setting per flow sheet. Updated exercise program to include: nu-step crosstrainer, bridging with ball, lower trunk rotation wtih ball, and standing back extension  stretch with hip flexor stretch.   -MM      Cueing 1 Verbal;Demo  -MM      Time 1 15  -MM      Additional Comments ther ex  -MM        User Key  (r) = Recorded By, (t) = Taken By, (c) = Cosigned By    Initials Name Provider Type    MM Patel Lopez, PT Physical Therapist        Time Calculation:   Start Time: 1130  Total Timed Code Minutes- PT: 15 minute(s)    Therapy Charges for Today     Code Description Service Date Service Provider Modifiers Qty    92307275824  PT THER PROC EA 15 MIN 5/9/2018 Patel Lopez, PT GP 1    [id number]  PT TRACTION LUMBAR 5/9/2018 Patel Lopez, PT GP 1                    Patel Lopez, PT  5/9/2018

## 2018-05-15 ENCOUNTER — HOSPITAL ENCOUNTER (OUTPATIENT)
Dept: PHYSICAL THERAPY | Facility: HOSPITAL | Age: 28
Setting detail: THERAPIES SERIES
Discharge: HOME | End: 2018-05-15

## 2018-05-15 DIAGNOSIS — M54.42 CHRONIC BILATERAL LOW BACK PAIN WITH BILATERAL SCIATICA: Primary | ICD-10-CM

## 2018-05-15 DIAGNOSIS — M54.41 CHRONIC BILATERAL LOW BACK PAIN WITH BILATERAL SCIATICA: Primary | ICD-10-CM

## 2018-05-15 DIAGNOSIS — M51.26 HNP (HERNIATED NUCLEUS PULPOSUS), LUMBAR: ICD-10-CM

## 2018-05-15 DIAGNOSIS — G89.29 CHRONIC BILATERAL LOW BACK PAIN WITH BILATERAL SCIATICA: Primary | ICD-10-CM

## 2018-05-15 PROCEDURE — 97012 MECHANICAL TRACTION THERAPY: CPT

## 2018-05-15 PROCEDURE — 97110 THERAPEUTIC EXERCISES: CPT

## 2018-05-15 NOTE — THERAPY TREATMENT NOTE
Outpatient Physical Therapy Ortho Treatment Note  [facility]     Patient Name: Taylor Rivas  : 1990  MRN: [id number]  Today's Date: 5/15/2018      Visit Date: 05/15/2018    Visit Dx:    ICD-10-CM ICD-9-CM   1. Chronic bilateral low back pain with bilateral sciatica M54.42 724.2    M54.41 724.3    G89.29 338.29   2. HNP (herniated nucleus pulposus), lumbar L5-S1 M51.26 722.10       Patient Active Problem List   Diagnosis   • PCOS (polycystic ovarian syndrome)   • HNP (herniated nucleus pulposus), lumbar L5-S1        Past Medical History:   Diagnosis Date   • Chronic cholecystitis    • PCOS (polycystic ovarian syndrome)         Past Surgical History:   Procedure Laterality Date   • LAPAROSCOPIC CHOLECYSTECTOMY     • WISDOM TOOTH EXTRACTION                 PT Assessment/Plan     Row Name 05/15/18 0730          PT Assessment    Assessment Comments No complaints with exercises.  She reported some discomfort around her hips with the pelvic harness for traction.  She reports her back actually did fill better while being stretched with traction.  He reports some relief afterwards as well.  -MM        PT Plan    PT Plan Comments Continue per plan of treatment with exercises and traction as tolerated.  Consider ASTYM.  -MM       User Key  (r) = Recorded By, (t) = Taken By, (c) = Cosigned By    Initials Name Provider Type    LAWANDA Lopez, PT Physical Therapist                Modalities     Row Name 05/15/18 0730             Traction 07510    Traction Type Lumbar  -MM      Rx Minutes 10  -MM      Duration Intermittent  -MM      Position Hook-lying  -MM      Weight 50  -MM      Hold 60  -MM      Relax 10  -MM        User Key  (r) = Recorded By, (t) = Taken By, (c) = Cosigned By    Initials Name Provider Type    LAWANDA Lopez, PT Physical Therapist                Exercises     Row Name 05/15/18 0730             Subjective Comments    Subjective Comments Client reports pain today at 4/10. She  reports feeling better for the day following last treatment. she noticed increased pain over the weekend. She feels like she may be having more pain because she was not very active this past weekend.  -MM         Subjective Pain    Able to rate subjective pain? yes  -MM      Pre-Treatment Pain Level 4  -MM      Post-Treatment Pain Level 3  -MM         Exercise 1    Exercise Name 1 Continued exercises in clinical setting per flow sheet.  Progressed exercises to include: Back extension machine and hip extension machine.  -MM      Cueing 1 Verbal  -MM      Time 1 15  -MM      Additional Comments ther ex  -MM        User Key  (r) = Recorded By, (t) = Taken By, (c) = Cosigned By    Initials Name Provider Type    MM Patel Lopez, PT Physical Therapist          Time Calculation:   Start Time: 0730  Total Timed Code Minutes- PT: 15 minute(s)    Therapy Charges for Today     Code Description Service Date Service Provider Modifiers Qty    32232373754  PT THER PROC EA 15 MIN 5/15/2018 Patel Lopez, PT GP 1    [id number]  PT TRACTION LUMBAR 5/15/2018 Patel Lopez, PT GP 1                    Patel Lopez, PT  5/15/2018

## 2018-05-17 ENCOUNTER — HOSPITAL ENCOUNTER (OUTPATIENT)
Dept: PHYSICAL THERAPY | Facility: HOSPITAL | Age: 28
Setting detail: THERAPIES SERIES
Discharge: HOME | End: 2018-05-17

## 2018-05-17 DIAGNOSIS — M51.26 HNP (HERNIATED NUCLEUS PULPOSUS), LUMBAR: ICD-10-CM

## 2018-05-17 DIAGNOSIS — M54.41 CHRONIC BILATERAL LOW BACK PAIN WITH BILATERAL SCIATICA: Primary | ICD-10-CM

## 2018-05-17 DIAGNOSIS — M54.42 CHRONIC BILATERAL LOW BACK PAIN WITH BILATERAL SCIATICA: Primary | ICD-10-CM

## 2018-05-17 DIAGNOSIS — G89.29 CHRONIC BILATERAL LOW BACK PAIN WITH BILATERAL SCIATICA: Primary | ICD-10-CM

## 2018-05-17 PROCEDURE — 97140 MANUAL THERAPY 1/> REGIONS: CPT

## 2018-05-17 PROCEDURE — 97110 THERAPEUTIC EXERCISES: CPT

## 2018-05-17 PROCEDURE — 97012 MECHANICAL TRACTION THERAPY: CPT

## 2018-05-17 NOTE — THERAPY TREATMENT NOTE
Outpatient Physical Therapy Ortho Treatment Note  [facility]     Patient Name: Taylor Rivas  : 1990  MRN: [id number]  Today's Date: 2018      Visit Date: 2018    Visit Dx:    ICD-10-CM ICD-9-CM   1. Chronic bilateral low back pain with bilateral sciatica M54.42 724.2    M54.41 724.3    G89.29 338.29   2. HNP (herniated nucleus pulposus), lumbar L5-S1 M51.26 722.10       Patient Active Problem List   Diagnosis   • PCOS (polycystic ovarian syndrome)   • HNP (herniated nucleus pulposus), lumbar L5-S1        Past Medical History:   Diagnosis Date   • Chronic cholecystitis    • PCOS (polycystic ovarian syndrome)         Past Surgical History:   Procedure Laterality Date   • LAPAROSCOPIC CHOLECYSTECTOMY     • WISDOM TOOTH EXTRACTION                               PT Assessment/Plan     Row Name 18 0730          PT Assessment    Assessment Comments Pain was better today. No complaints with exercises or traction.  -MM        PT Plan    PT Plan Comments Continue per plan of treatment with exercises and traction.   -MM       User Key  (r) = Recorded By, (t) = Taken By, (c) = Cosigned By    Initials Name Provider Type    LAWANDA Lopez, PT Physical Therapist                Modalities     Row Name 18 0730             Traction 45099    Traction Type Lumbar  -MM      Rx Minutes 10  -MM      Duration Intermittent  -MM      Position Hook-lying  -MM      Weight 50  -MM      Hold 60  -MM      Relax 10  -MM        User Key  (r) = Recorded By, (t) = Taken By, (c) = Cosigned By    Initials Name Provider Type    LAWANDA Lopez, PT Physical Therapist                Exercises     Row Name 18 0730             Subjective Comments    Subjective Comments Client reports no pain at the time of treatment today.  -MM         Subjective Pain    Able to rate subjective pain? yes  -MM      Pre-Treatment Pain Level 0  -MM      Post-Treatment Pain Level 0  -MM         Exercise 1    Exercise Name  1 Continued exercises in clinical setting per flow sheet.   -MM      Cueing 1 Verbal  -MM      Time 1 15  -MM      Additional Comments ther ex  -MM        User Key  (r) = Recorded By, (t) = Taken By, (c) = Cosigned By    Initials Name Provider Type    MM Patel Lopez, PT Physical Therapist                                            Time Calculation:   Start Time: 0730  Total Timed Code Minutes- PT: 15 minute(s)    Therapy Charges for Today     Code Description Service Date Service Provider Modifiers Qty    08116354910  PT THER PROC EA 15 MIN 5/17/2018 Patel Lopez, PT GP 1    [id number]  PT TRACTION LUMBAR 5/17/2018 Patel Lopez, PT GP 1                    Patel Lopez, PT  5/17/2018

## 2018-05-21 ENCOUNTER — HOSPITAL ENCOUNTER (OUTPATIENT)
Dept: PHYSICAL THERAPY | Facility: HOSPITAL | Age: 28
Setting detail: THERAPIES SERIES
Discharge: HOME | End: 2018-05-21

## 2018-05-21 DIAGNOSIS — M54.42 CHRONIC BILATERAL LOW BACK PAIN WITH BILATERAL SCIATICA: Primary | ICD-10-CM

## 2018-05-21 DIAGNOSIS — M51.26 HNP (HERNIATED NUCLEUS PULPOSUS), LUMBAR: ICD-10-CM

## 2018-05-21 DIAGNOSIS — G89.29 CHRONIC BILATERAL LOW BACK PAIN WITH BILATERAL SCIATICA: Primary | ICD-10-CM

## 2018-05-21 DIAGNOSIS — M54.41 CHRONIC BILATERAL LOW BACK PAIN WITH BILATERAL SCIATICA: Primary | ICD-10-CM

## 2018-05-21 PROCEDURE — 97012 MECHANICAL TRACTION THERAPY: CPT

## 2018-05-21 PROCEDURE — 97110 THERAPEUTIC EXERCISES: CPT

## 2018-05-21 NOTE — THERAPY TREATMENT NOTE
Outpatient Physical Therapy Ortho Treatment Note  [facility]     Patient Name: Taylor Rivas  : 1990  MRN: [id number]  Today's Date: 2018      Visit Date: 2018    Visit Dx:    ICD-10-CM ICD-9-CM   1. Chronic bilateral low back pain with bilateral sciatica M54.42 724.2    M54.41 724.3    G89.29 338.29   2. HNP (herniated nucleus pulposus), lumbar L5-S1 M51.26 722.10       Patient Active Problem List   Diagnosis   • PCOS (polycystic ovarian syndrome)   • HNP (herniated nucleus pulposus), lumbar L5-S1        Past Medical History:   Diagnosis Date   • Chronic cholecystitis    • PCOS (polycystic ovarian syndrome)         Past Surgical History:   Procedure Laterality Date   • LAPAROSCOPIC CHOLECYSTECTOMY     • WISDOM TOOTH EXTRACTION               PT Assessment/Plan     Row Name 18 1045          PT Assessment    Assessment Comments Client is noticing improvement in overall pain. No complaints with treatment today.   -MM        PT Plan    PT Plan Comments Continue per plan of treatment with exercises and traction.  -MM       User Key  (r) = Recorded By, (t) = Taken By, (c) = Cosigned By    Initials Name Provider Type    LAWANDA Lopez, PT Physical Therapist                Modalities     Row Name 18 1045             Traction 13429    Traction Type Lumbar  -MM      Rx Minutes 10  -MM      Duration Intermittent  -MM      Position Hook-lying  -MM      Weight 50  -MM      Hold 60  -MM      Relax 10  -MM        User Key  (r) = Recorded By, (t) = Taken By, (c) = Cosigned By    Initials Name Provider Type    LAWANDA Lopez, PT Physical Therapist                Exercises     Row Name 18 1045             Subjective Comments    Subjective Comments Client reports continued improvement. She notes a little discomfort in her hip.  -MM         Subjective Pain    Able to rate subjective pain? yes  -MM      Pre-Treatment Pain Level 1  -MM      Post-Treatment Pain Level 1  -MM          Exercise 1    Exercise Name 1 Continued exercises in clinical setting per flow sheet.   -MM      Cueing 1 Verbal  -MM      Time 1 15  -MM      Additional Comments ther ex  -MM        User Key  (r) = Recorded By, (t) = Taken By, (c) = Cosigned By    Initials Name Provider Type    MM Patel Lopez, PT Physical Therapist             Time Calculation:   Start Time: 1045  Total Timed Code Minutes- PT: 15 minute(s)    Therapy Charges for Today     Code Description Service Date Service Provider Modifiers Qty    89865754845  PT THER PROC EA 15 MIN 5/21/2018 Patel Lopez, PT GP 1    [id number]  PT TRACTION LUMBAR 5/21/2018 Patel Lopez, PT GP 1                    Patel Lopez, PT  5/21/2018

## 2018-05-23 ENCOUNTER — HOSPITAL ENCOUNTER (OUTPATIENT)
Dept: PHYSICAL THERAPY | Facility: HOSPITAL | Age: 28
Setting detail: THERAPIES SERIES
Discharge: HOME | End: 2018-05-23

## 2018-05-23 DIAGNOSIS — M54.42 CHRONIC BILATERAL LOW BACK PAIN WITH BILATERAL SCIATICA: Primary | ICD-10-CM

## 2018-05-23 DIAGNOSIS — M51.26 HNP (HERNIATED NUCLEUS PULPOSUS), LUMBAR: ICD-10-CM

## 2018-05-23 DIAGNOSIS — G89.29 CHRONIC BILATERAL LOW BACK PAIN WITH BILATERAL SCIATICA: Primary | ICD-10-CM

## 2018-05-23 DIAGNOSIS — M54.41 CHRONIC BILATERAL LOW BACK PAIN WITH BILATERAL SCIATICA: Primary | ICD-10-CM

## 2018-05-23 PROCEDURE — 97140 MANUAL THERAPY 1/> REGIONS: CPT

## 2018-05-23 PROCEDURE — 97110 THERAPEUTIC EXERCISES: CPT

## 2018-05-23 NOTE — THERAPY TREATMENT NOTE
Outpatient Physical Therapy Ortho Treatment Note  [facility]     Patient Name: Taylor Rivas  : 1990  MRN: [id number]  Today's Date: 2018      Visit Date: 2018    Visit Dx:    ICD-10-CM ICD-9-CM   1. Chronic bilateral low back pain with bilateral sciatica M54.42 724.2    M54.41 724.3    G89.29 338.29   2. HNP (herniated nucleus pulposus), lumbar L5-S1 M51.26 722.10       Patient Active Problem List   Diagnosis   • PCOS (polycystic ovarian syndrome)   • HNP (herniated nucleus pulposus), lumbar L5-S1        Past Medical History:   Diagnosis Date   • Chronic cholecystitis    • PCOS (polycystic ovarian syndrome)         Past Surgical History:   Procedure Laterality Date   • LAPAROSCOPIC CHOLECYSTECTOMY     • WISDOM TOOTH EXTRACTION                               PT Assessment/Plan     Row Name 18 1045          PT Assessment    Assessment Comments Held on traction today due to soreness after last visit.  Exercises are tolerated well and overall client is had much less pain.  ASTYM was included with treatment today.  She reported relief with ASTYM.  -MM        PT Plan    PT Plan Comments Continue per plan of treatment with ASTYM and exercises.  -MM       User Key  (r) = Recorded By, (t) = Taken By, (c) = Cosigned By    Initials Name Provider Type    MM Patel Lopez, PT Physical Therapist                    Exercises     Row Name 18 1000             Subjective Comments    Subjective Comments Client reports no pain today, but she did have some soreness after traction.  -MM         Subjective Pain    Able to rate subjective pain? yes  -MM      Pre-Treatment Pain Level 0  -MM      Post-Treatment Pain Level 0  -MM         Exercise 1    Exercise Name 1 Continued exercises in clinical setting per flow sheet.  Progressed exercises to include standing trunk rotation with cable column.  -MM      Cueing 1 Verbal  -MM      Time 1 15  -MM      Additional Comments Therapeutic exercise  -MM         User Key  (r) = Recorded By, (t) = Taken By, (c) = Cosigned By    Initials Name Provider Type    LAWANDA Lopez PT Physical Therapist                        Manual Rx (last 36 hours)      Manual Treatments     Row Name 05/23/18 1045             Manual Rx 1    Manual Rx 1 Location Bilateral lumbar paravertebrals  -MM      Manual Rx 1 Type Provided soft tissue mobilization using ASTYM technique.  Moderate pressure was used with ASTYM.  Client was positioned prone.  -MM      Manual Rx 1 Duration 15  -MM        User Key  (r) = Recorded By, (t) = Taken By, (c) = Cosigned By    Initials Name Provider Type    LAWANDA Lopez, PT Physical Therapist                             Time Calculation:   Start Time: 1045  Total Timed Code Minutes- PT: 30 minute(s)    Therapy Charges for Today     Code Description Service Date Service Provider Modifiers Qty    [id number]  PT THER PROC EA 15 MIN 5/23/2018 Patel Lopez, PT GP 1    [id number]  PT MANUAL THERAPY EA 15 MIN 5/23/2018 Patel Lopez, PT GP 1                    Patel Lopez, PT  5/23/2018

## 2018-05-31 ENCOUNTER — HOSPITAL ENCOUNTER (OUTPATIENT)
Dept: PHYSICAL THERAPY | Facility: HOSPITAL | Age: 28
Setting detail: THERAPIES SERIES
Discharge: HOME | End: 2018-05-31

## 2018-05-31 DIAGNOSIS — G89.29 CHRONIC BILATERAL LOW BACK PAIN WITH BILATERAL SCIATICA: Primary | ICD-10-CM

## 2018-05-31 DIAGNOSIS — M51.26 HNP (HERNIATED NUCLEUS PULPOSUS), LUMBAR: ICD-10-CM

## 2018-05-31 DIAGNOSIS — M54.42 CHRONIC BILATERAL LOW BACK PAIN WITH BILATERAL SCIATICA: Primary | ICD-10-CM

## 2018-05-31 DIAGNOSIS — M54.41 CHRONIC BILATERAL LOW BACK PAIN WITH BILATERAL SCIATICA: Primary | ICD-10-CM

## 2018-05-31 PROCEDURE — 97110 THERAPEUTIC EXERCISES: CPT

## 2018-05-31 PROCEDURE — 97012 MECHANICAL TRACTION THERAPY: CPT

## 2018-06-04 ENCOUNTER — HOSPITAL ENCOUNTER (OUTPATIENT)
Dept: PHYSICAL THERAPY | Facility: HOSPITAL | Age: 28
Setting detail: THERAPIES SERIES
Discharge: HOME | End: 2018-06-04

## 2018-06-04 DIAGNOSIS — G89.29 CHRONIC BILATERAL LOW BACK PAIN WITH BILATERAL SCIATICA: Primary | ICD-10-CM

## 2018-06-04 DIAGNOSIS — M51.26 HNP (HERNIATED NUCLEUS PULPOSUS), LUMBAR: ICD-10-CM

## 2018-06-04 DIAGNOSIS — M54.42 CHRONIC BILATERAL LOW BACK PAIN WITH BILATERAL SCIATICA: Primary | ICD-10-CM

## 2018-06-04 DIAGNOSIS — M54.41 CHRONIC BILATERAL LOW BACK PAIN WITH BILATERAL SCIATICA: Primary | ICD-10-CM

## 2018-06-04 PROCEDURE — 97110 THERAPEUTIC EXERCISES: CPT

## 2018-06-04 PROCEDURE — 97012 MECHANICAL TRACTION THERAPY: CPT

## 2018-06-04 NOTE — THERAPY DISCHARGE NOTE
Outpatient Physical Therapy Ortho Progress Note/Discharge Summary   [facility]     Patient Name: Taylor Rivas  : 1990  MRN: [id number]  Today's Date: 2018      Visit Date: 2018    Visit Dx:    ICD-10-CM ICD-9-CM   1. Chronic bilateral low back pain with bilateral sciatica M54.42 724.2    M54.41 724.3    G89.29 338.29   2. HNP (herniated nucleus pulposus), lumbar L5-S1 M51.26 722.10       Patient Active Problem List   Diagnosis   • PCOS (polycystic ovarian syndrome)   • HNP (herniated nucleus pulposus), lumbar L5-S1        Past Medical History:   Diagnosis Date   • Chronic cholecystitis    • PCOS (polycystic ovarian syndrome)         Past Surgical History:   Procedure Laterality Date   • LAPAROSCOPIC CHOLECYSTECTOMY     • WISDOM TOOTH EXTRACTION               PT Ortho     Row Name 18 1515       Precautions and Contraindications    Precautions MRI consistent with central disc herniation L5/S1  -MM       Posture/Observations    Posture/Observations Comments No signs of scoliosis. Pelvic alignment is symmetrical.  Palpation: Marked tenderness central lumbosacral region, superior gluteal region bilateral and right gluteal region and right hamstrings.  -MM       Neural Tension Signs- Lower Quarter Clearing    SLR Bilateral:;Positive  -MM       Lumbar ROM Screen- Lower Quarter Clearing    Lumbar Flexion Impaired   75% with some increased pain in the right leg and back pain  -MM    Lumbar Extension Impaired   38° with central pain on the left  -MM    Lumbar Lateral Flexion Impaired   Right: 25° with pain; left 28°  -MM    Lumbar Rotation Impaired   52° bilateral  -MM    Lumbar Quadrant  Impaired   Pain bilateral; left greater than right  -MM       General ROM    GENERAL ROM COMMENTS Directional preference for extension continues.   -MM       General Assessment (Manual Muscle Testing)    Comment, General Manual Muscle Testing (MMT) Assessment Bilateral hip strength= 5/5.  -MM      User Key   (r) = Recorded By, (t) = Taken By, (c) = Cosigned By    Initials Name Provider Type    LAWANDA Lopez, PT Physical Therapist                            PT Assessment/Plan     Row Name 06/04/18 1515          PT Assessment    Assessment Comments Overall client is making progress. She continues with pain 1-2 days/ week. She tolerates exercises well and is independent with exercising on her own. She noted some relief from traction. She continues to have some difficulty bending forward. Hip strength improved to normal. Symptoms are much less than they were at the initial exam. Modified Oswestry score improved from 32% to 22% disability. She is independent with exercises and plans to continue exercises on her own.   -MM     Please refer to paper survey for additional self-reported information Yes  -MM     Rehab Potential Good  -MM        PT Plan    PT Plan Comments Discharge due to patient independent with exercises to continue.   -MM       User Key  (r) = Recorded By, (t) = Taken By, (c) = Cosigned By    Initials Name Provider Type    LAWANDA Lopez, PT Physical Therapist                Modalities     Row Name 06/04/18 1515             Traction 10394    Traction Type Lumbar  -MM      Rx Minutes 10  -MM      Duration Intermittent  -MM      Position Hook-lying  -MM      Weight 45  -MM      Hold 60  -MM      Relax 10  -MM        User Key  (r) = Recorded By, (t) = Taken By, (c) = Cosigned By    Initials Name Provider Type    LAWANDA Lopez, PT Physical Therapist                Exercises     Row Name 06/04/18 1512             Subjective Comments    Subjective Comments Client reports some back pain and superior gluteal pain bilateral sitting.  She reports that symptoms are not constant, but she often has pain on Monday or 1-2 days/week.  -MM         Subjective Pain    Able to rate subjective pain? yes  -MM      Pre-Treatment Pain Level 2  -MM      Post-Treatment Pain Level 1  -MM         Exercise 1    Exercise  Name 1 Re-exam time included in ther-ex.   Also included exercises in clinical setting per flow sheet.   -MM      Cueing 1 Verbal  -MM      Time 1 20  -MM      Additional Comments ther ex  -MM        User Key  (r) = Recorded By, (t) = Taken By, (c) = Cosigned By    Initials Name Provider Type    LAWANDA Lopez, PT Physical Therapist                  PT OP Goals     Row Name 06/04/18 1515          PT Short Term Goals    STG Date to Achieve 05/28/18  -MM     STG 1 Client will report 80% improvement in low back pain.  -MM     STG 1 Progress Not Met  -MM     STG 1 Progress Comments 45%  -MM     STG 2 Radicular right leg pain will resolve.  -MM     STG 2 Progress Not Met  -MM     STG 2 Progress Comments improved to 2x/week  -MM     STG 3 Lower lumbar tenderness will resolve.  -MM     STG 3 Progress Not Met  -MM     STG 3 Progress Comments mild tenderness continues  -MM     STG 4 Client will be independent with home program to minimize pain and improve overall mobility and function.  -MM     STG 4 Progress Met  -MM        Long Term Goals    LTG Date to Achieve 06/04/18  -MM     LTG 1 Modified Oswestry score will improve to 16% or better.  -MM     LTG 1 Progress Not Met  -MM     LTG 1 Progress Comments 22%  -MM     LTG 2 Client will improve to picking up light items from the floor without difficulty.  -MM     LTG 2 Progress Not Met  -MM     LTG 2 Progress Comments mild difficulty  -MM     LTG 3 Bilateral hip extension strength will improve to 5/5.  -MM     LTG 3 Progress Met  -MM       User Key  (r) = Recorded By, (t) = Taken By, (c) = Cosigned By    Initials Name Provider Type    LAWANDA Lopez, PT Physical Therapist               Outcome Measure Options: Modifed Owestry  Modified Oswestry  Modified Oswestry Score/Comments: 22%      Time Calculation:   Start Time: 1515  Total Timed Code Minutes- PT: 30 minute(s)    Therapy Charges for Today     Code Description Service Date Service Provider Modifiers Qty     [id number]  PT THER PROC EA 15 MIN 6/4/2018 Patel Lopez, PT GP 1    51394854400 HC PT TRACTION LUMBAR 6/4/2018 Patel Lopez, PT GP 1          PT G-Codes  Outcome Measure Options: Juhi Nieves     OP PT Discharge Summary  Date of Discharge: 06/04/18  Reason for Discharge: Independent  Outcomes Achieved: Refer to plan of care for updates on goals achieved  Discharge Destination: Home with home program      Patel Lopez, PT  6/4/2018

## 2018-06-07 ENCOUNTER — OFFICE VISIT (OUTPATIENT)
Dept: NEUROSURGERY | Facility: CLINIC | Age: 28
End: 2018-06-07

## 2018-06-07 VITALS — HEIGHT: 64 IN

## 2018-06-07 DIAGNOSIS — M51.26 HNP (HERNIATED NUCLEUS PULPOSUS), LUMBAR: Primary | ICD-10-CM

## 2018-06-07 PROCEDURE — 99213 OFFICE O/P EST LOW 20 MIN: CPT | Performed by: NEUROLOGICAL SURGERY

## 2018-06-07 NOTE — PROGRESS NOTES
Subjective   Taylor Rivas is a 27 y.o. female who presents for follow up of  low back pain.     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  She has a history of back pain for 8 months with numbness that radiates to her legs intermittently.  MRI scan on 3/20/18 showed a central disc herniation at L5-S1, moderately large in size, somewhat compressing S1 nerve roots bilaterally.  After I saw her over a month ago physical therapy was started and has been extremely successful in reducing symptoms to make her comfortable in her daily activities.  She can now sit, stand, and move about, although she protects her back.  She is able to work without restriction.  She is taking no medications at this time.    The following portions of the patient's history were reviewed, updated as appropriate and approved: allergies, current medications, past family history, past medical history, past social history, past surgical history, review of systems and problem list.     Review of Systems   Constitutional: Negative for activity change, appetite change, chills, diaphoresis, fatigue, fever and unexpected weight change.   HENT: Negative for congestion, dental problem, drooling, ear discharge, ear pain, facial swelling, hearing loss, mouth sores, nosebleeds, postnasal drip, rhinorrhea, sinus pressure, sneezing, sore throat, tinnitus, trouble swallowing and voice change.    Eyes: Negative for photophobia, pain, discharge, redness, itching and visual disturbance.   Respiratory: Negative for apnea, cough, choking, chest tightness, shortness of breath, wheezing and stridor.    Cardiovascular: Negative for chest pain, palpitations and leg swelling.   Gastrointestinal: Negative for abdominal distention, abdominal pain, anal bleeding, blood in stool, constipation, diarrhea, nausea, rectal pain and vomiting.   Endocrine: Negative for cold intolerance, heat intolerance,  polydipsia, polyphagia and polyuria.   Genitourinary: Negative for decreased urine volume, difficulty urinating, dysuria, enuresis, flank pain, frequency, genital sores, hematuria and urgency.   Musculoskeletal: Positive for back pain. Negative for arthralgias, gait problem, joint swelling, myalgias, neck pain and neck stiffness.   Skin: Negative for color change, pallor, rash and wound.   Allergic/Immunologic: Negative for environmental allergies, food allergies and immunocompromised state.   Neurological: Positive for weakness and numbness. Negative for dizziness, tremors, seizures, syncope, facial asymmetry, speech difficulty, light-headedness and headaches.   Hematological: Negative for adenopathy. Does not bruise/bleed easily.   Psychiatric/Behavioral: Negative for agitation, behavioral problems, confusion, decreased concentration, dysphoric mood, hallucinations, self-injury, sleep disturbance and suicidal ideas. The patient is not nervous/anxious and is not hyperactive.        Objective    NEUROLOGICAL EXAMINATION:    Neurologic exam is intact.    Assessment   Central disc herniation L5-S1, chronic back pain syndrome for 8 months significantly improved after physical therapy.       Plan   This phase of treatment has been extremely successful.  No further follow-up necessary at this time.       Saúl Burgos MD

## 2018-07-24 ENCOUNTER — LAB (OUTPATIENT)
Dept: LAB | Facility: HOSPITAL | Age: 28
End: 2018-07-24

## 2018-07-24 DIAGNOSIS — E55.9 VITAMIN D DEFICIENCY: ICD-10-CM

## 2018-07-24 DIAGNOSIS — E28.2 PCOS (POLYCYSTIC OVARIAN SYNDROME): Primary | ICD-10-CM

## 2018-07-24 DIAGNOSIS — E28.2 PCOS (POLYCYSTIC OVARIAN SYNDROME): ICD-10-CM

## 2018-07-24 LAB
25(OH)D3 SERPL-MCNC: 20.1 NG/ML
TSH SERPL DL<=0.05 MIU/L-ACNC: 2.12 MIU/ML (ref 0.35–5.35)

## 2018-07-24 PROCEDURE — 36415 COLL VENOUS BLD VENIPUNCTURE: CPT

## 2018-07-24 PROCEDURE — 84402 ASSAY OF FREE TESTOSTERONE: CPT

## 2018-07-24 PROCEDURE — 84403 ASSAY OF TOTAL TESTOSTERONE: CPT

## 2018-07-24 PROCEDURE — 84443 ASSAY THYROID STIM HORMONE: CPT

## 2018-07-24 PROCEDURE — 82306 VITAMIN D 25 HYDROXY: CPT

## 2018-07-26 LAB
TESTOST FREE SERPL-MCNC: 1.5 PG/ML (ref 0–4.2)
TESTOST SERPL-MCNC: 54 NG/DL (ref 8–48)

## 2018-08-06 RX ORDER — ERGOCALCIFEROL 1.25 MG/1
50000 CAPSULE ORAL WEEKLY
Qty: 4 CAPSULE | Refills: 3 | Status: SHIPPED | OUTPATIENT
Start: 2018-08-06 | End: 2019-06-21

## 2018-09-06 RX ORDER — SERTRALINE HYDROCHLORIDE 100 MG/1
100 TABLET, FILM COATED ORAL DAILY
Qty: 30 TABLET | Refills: 5 | Status: SHIPPED | OUTPATIENT
Start: 2018-09-06 | End: 2019-03-26 | Stop reason: SDUPTHER

## 2018-09-28 ENCOUNTER — LAB (OUTPATIENT)
Dept: LAB | Facility: HOSPITAL | Age: 28
End: 2018-09-28

## 2018-09-28 DIAGNOSIS — R30.0 DYSURIA: Primary | ICD-10-CM

## 2018-09-28 DIAGNOSIS — R30.0 DYSURIA: ICD-10-CM

## 2018-09-28 LAB
BACTERIA UR QL AUTO: ABNORMAL /HPF
BILIRUB UR QL STRIP: NEGATIVE
CLARITY UR: CLEAR
COLOR UR: YELLOW
GLUCOSE UR STRIP-MCNC: NEGATIVE MG/DL
HGB UR QL STRIP.AUTO: ABNORMAL
HYALINE CASTS UR QL AUTO: ABNORMAL /LPF
KETONES UR QL STRIP: NEGATIVE
LEUKOCYTE ESTERASE UR QL STRIP.AUTO: NEGATIVE
NITRITE UR QL STRIP: NEGATIVE
PH UR STRIP.AUTO: 6 [PH] (ref 5–8)
PROT UR QL STRIP: ABNORMAL
RBC # UR: ABNORMAL /HPF
REF LAB TEST METHOD: ABNORMAL
SP GR UR STRIP: 1.02 (ref 1–1.03)
SQUAMOUS #/AREA URNS HPF: ABNORMAL /HPF
UROBILINOGEN UR QL STRIP: ABNORMAL
WBC UR QL AUTO: ABNORMAL /HPF

## 2018-09-28 PROCEDURE — 81001 URINALYSIS AUTO W/SCOPE: CPT

## 2018-09-28 RX ORDER — METRONIDAZOLE 7.5 MG/G
GEL VAGINAL NIGHTLY
Qty: 70 G | Refills: 0 | Status: SHIPPED | OUTPATIENT
Start: 2018-09-28 | End: 2018-10-10

## 2018-10-04 ENCOUNTER — TELEPHONE (OUTPATIENT)
Dept: GYNECOLOGIC ONCOLOGY | Facility: CLINIC | Age: 28
End: 2018-10-04

## 2018-11-06 ENCOUNTER — LAB (OUTPATIENT)
Dept: LAB | Facility: HOSPITAL | Age: 28
End: 2018-11-06

## 2018-11-06 DIAGNOSIS — R10.13 EPIGASTRIC PAIN: ICD-10-CM

## 2018-11-06 DIAGNOSIS — R11.0 NAUSEA: ICD-10-CM

## 2018-11-06 DIAGNOSIS — R10.13 EPIGASTRIC PAIN: Primary | ICD-10-CM

## 2018-11-06 LAB
HAV IGM SERPL QL IA: NORMAL
HBV CORE IGM SERPL QL IA: NORMAL
HBV SURFACE AG SERPL QL IA: NORMAL
HCV AB SER DONR QL: NORMAL

## 2018-11-06 PROCEDURE — 36415 COLL VENOUS BLD VENIPUNCTURE: CPT

## 2018-11-06 PROCEDURE — 86677 HELICOBACTER PYLORI ANTIBODY: CPT

## 2018-11-06 PROCEDURE — 80074 ACUTE HEPATITIS PANEL: CPT

## 2018-11-08 LAB
H PYLORI IGA SER IA-ACNC: <9 UNITS (ref 0–8.9)
H PYLORI IGG SER IA-ACNC: 0.06 INDEX VALUE (ref 0–0.79)
H PYLORI IGM SER-ACNC: 11.8 UNITS (ref 0–8.9)

## 2018-11-09 DIAGNOSIS — A04.8 H. PYLORI INFECTION: Primary | ICD-10-CM

## 2018-11-09 RX ORDER — FLUCONAZOLE 150 MG/1
150 TABLET ORAL
Qty: 2 TABLET | Refills: 0 | Status: SHIPPED | OUTPATIENT
Start: 2018-11-09 | End: 2018-11-15

## 2018-11-09 RX ORDER — AMOXICILLIN 500 MG/1
1000 CAPSULE ORAL 2 TIMES DAILY
Qty: 56 CAPSULE | Refills: 0 | Status: SHIPPED | OUTPATIENT
Start: 2018-11-09 | End: 2019-01-02

## 2018-11-09 RX ORDER — CLARITHROMYCIN 500 MG/1
500 TABLET, COATED ORAL 2 TIMES DAILY
Qty: 28 TABLET | Refills: 0 | Status: SHIPPED | OUTPATIENT
Start: 2018-11-09 | End: 2019-01-02

## 2018-11-09 RX ORDER — LANSOPRAZOLE, AMOXICILLIN, CLARITHROMYCIN 30-500-500
KIT ORAL TAKE AS DIRECTED
Qty: 112 EACH | Refills: 0 | Status: SHIPPED | OUTPATIENT
Start: 2018-11-09 | End: 2018-11-09 | Stop reason: ALTCHOICE

## 2018-11-09 RX ORDER — LANSOPRAZOLE 30 MG/1
30 CAPSULE, DELAYED RELEASE ORAL 2 TIMES DAILY
Qty: 28 CAPSULE | Refills: 0 | Status: SHIPPED | OUTPATIENT
Start: 2018-11-09 | End: 2019-01-02 | Stop reason: SDUPTHER

## 2018-11-19 RX ORDER — SUCRALFATE 1 G/1
1 TABLET ORAL 4 TIMES DAILY
Qty: 120 TABLET | Refills: 3 | Status: SHIPPED | OUTPATIENT
Start: 2018-11-19 | End: 2019-01-02

## 2019-01-02 RX ORDER — LANSOPRAZOLE 30 MG/1
30 CAPSULE, DELAYED RELEASE ORAL DAILY
Qty: 30 CAPSULE | Refills: 5 | Status: SHIPPED | OUTPATIENT
Start: 2019-01-02 | End: 2021-01-07

## 2019-01-02 RX ORDER — BENZONATATE 100 MG/1
100 CAPSULE ORAL 3 TIMES DAILY PRN
Qty: 30 CAPSULE | Refills: 0 | Status: SHIPPED | OUTPATIENT
Start: 2019-01-02 | End: 2019-06-21

## 2019-01-23 DIAGNOSIS — R79.89 ELEVATED TESTOSTERONE LEVEL IN FEMALE: ICD-10-CM

## 2019-01-23 DIAGNOSIS — E28.2 PCOS (POLYCYSTIC OVARIAN SYNDROME): ICD-10-CM

## 2019-01-23 RX ORDER — SPIRONOLACTONE 50 MG/1
TABLET, FILM COATED ORAL
Qty: 30 TABLET | Refills: 5 | Status: SHIPPED | OUTPATIENT
Start: 2019-01-23 | End: 2020-04-07 | Stop reason: SDUPTHER

## 2019-03-08 ENCOUNTER — OFFICE VISIT (OUTPATIENT)
Dept: GYNECOLOGIC ONCOLOGY | Facility: CLINIC | Age: 29
End: 2019-03-08

## 2019-03-08 VITALS
WEIGHT: 241 LBS | OXYGEN SATURATION: 96 % | DIASTOLIC BLOOD PRESSURE: 60 MMHG | RESPIRATION RATE: 16 BRPM | HEART RATE: 78 BPM | SYSTOLIC BLOOD PRESSURE: 134 MMHG | HEIGHT: 64 IN | TEMPERATURE: 97 F | BODY MASS INDEX: 41.15 KG/M2

## 2019-03-08 DIAGNOSIS — E28.2 PCOS (POLYCYSTIC OVARIAN SYNDROME): ICD-10-CM

## 2019-03-08 DIAGNOSIS — Z01.419 WELL WOMAN EXAM WITH ROUTINE GYNECOLOGICAL EXAM: Primary | ICD-10-CM

## 2019-03-08 DIAGNOSIS — B37.31 VAGINAL YEAST INFECTION: ICD-10-CM

## 2019-03-08 PROCEDURE — 99395 PREV VISIT EST AGE 18-39: CPT | Performed by: NURSE PRACTITIONER

## 2019-03-08 RX ORDER — FLUCONAZOLE 150 MG/1
150 TABLET ORAL
Qty: 2 TABLET | Refills: 0 | Status: SHIPPED | OUTPATIENT
Start: 2019-03-08 | End: 2020-01-03

## 2019-03-08 RX ORDER — LETROZOLE 2.5 MG/1
2.5 TABLET, FILM COATED ORAL DAILY
Qty: 10 TABLET | Refills: 0 | Status: SHIPPED | OUTPATIENT
Start: 2019-03-08 | End: 2019-03-08

## 2019-03-08 NOTE — PROGRESS NOTES
GYN ONCOLOGY ANNUAL WELL WOMAN VISIT      Taylor Rivas  [id number]  1990      Chief Complaint: Annual Exam (c/o white vaginal discharge)        History of present illness:  Taylor Rivas is a 28 y.o. year old  who is here today for an annual exam. She has known PCOS, currently managed with metformin and spironolactone. She uses cyclic progesterone to induce menses. Her only c/o today is of white vaginal discharge. She has frequent yeast infections that respond well to diflucan. She recently returned from a trip to [place], [place] where she spent time in the hot tub. Otherwise, she is feeling generally well. She denies abnormal bleeding, pelvic pain, concerning lesions, breast problems, or changes in bowel or bladder function. Her well woman screenings are currently UTD.         Obstetric History:  OB History      Para Term  AB Living    0 0 0 0 0 0    SAB TAB Ectopic Molar Multiple Live Births    0 0 0   0           Menstrual History:     No LMP recorded.          Past Medical History:   Diagnosis Date   • BMI 40.0-44.9, adult (CMS/HCC)    • Chronic cholecystitis    • PCOS (polycystic ovarian syndrome)        Past Surgical History:   Procedure Laterality Date   • LAPAROSCOPIC CHOLECYSTECTOMY     • WISDOM TOOTH EXTRACTION         MEDICATIONS: The current medication list was reviewed and reconciled.     Allergies:  has No Known Allergies.    Family History   Problem Relation Age of Onset   • Thyroid disease Mother    • Hypertension Maternal Grandmother    • Stroke Maternal Grandmother    • Lymphoma Paternal Grandfather 65        NH Lymphoma   • Breast cancer Other    • Stomach cancer Other    • Heart valve disorder Father    • No Known Problems Brother    • Bone cancer Paternal Aunt 50        osteosarcoma?   • Hyperlipidemia Maternal Grandfather    • No Known Problems Paternal Grandmother        Health Maintenance:  Last pap smear was 3/2/2018, results were  normal PAP..    Review of  "Systems   Constitutional: Negative for fatigue, fever and unexpected weight change.   HENT: Negative for congestion, ear pain, hearing loss, sinus pressure and trouble swallowing.    Eyes: Negative for visual disturbance.   Respiratory: Negative for cough, chest tightness, shortness of breath and wheezing.    Cardiovascular: Negative for chest pain, palpitations and leg swelling.   Gastrointestinal: Negative for abdominal distention, abdominal pain, constipation, diarrhea, nausea and vomiting.   Endocrine: Negative for cold intolerance, heat intolerance, polydipsia, polyphagia and polyuria.   Genitourinary: Negative for difficulty urinating, dyspareunia, dysuria, frequency, hematuria, pelvic pain, urgency, vaginal bleeding, vaginal discharge and vaginal pain.   Musculoskeletal: Negative for arthralgias, gait problem, joint swelling and myalgias.   Skin: Negative for color change, pallor and rash.   Neurological: Negative for dizziness, seizures, syncope, weakness, light-headedness, numbness and headaches.   Hematological: Negative for adenopathy. Does not bruise/bleed easily.   Psychiatric/Behavioral: Negative for agitation, confusion, sleep disturbance and suicidal ideas. The patient is not nervous/anxious.        Physical Exam  Vital Signs: /60   Pulse 78   Temp 97 °F (36.1 °C) (Temporal)   Resp 16   Ht 162.6 cm (64\")   Wt 109 kg (241 lb)   SpO2 96%   BMI 41.37 kg/m²    General Appearance:  alert, cooperative, no apparent distress and appears stated age   Neurologic/Psychiatric: A&O x 3, gait steady, appropriate affect   HEENT:  Normocephalic, without obvious abnormality, mucous membranes moist   Neck: Supple, symmetrical, trachea midline, no adenopathy;  No thyromegaly, masses, or tenderness   Back:   Symmetric, no curvature, ROM normal, no CVA tenderness   Lungs:   Clear to auscultation bilaterally; respirations regular, even, and unlabored bilaterally   Heart:  Regular rate and rhythm, no murmurs " appreciated   Breasts:  Symmetrical, no masses, no lesions and no nipple discharge   Abdomen:   Soft, non-tender, non-distended and no organomegaly   Lymph nodes: No cervical, supraclavicular, inguinal or axillary adenopathy noted   Extremities: Normal, atraumatic; no clubbing, cyanosis, or edema    Skin: No rashes, ulcers, or suspicious lesions noted   Pelvic: External Genitalia  without lesions or skin changes  Vagina  is pink, moist, without lesions.  and has a present discharge.  Cervix  normal, without lesions, no discharge, no CMT and pap smear obtained  Uterus  normal size, midposition, mobile and nontender  Ovaries  without palpable masses or fullness  Parametria  smooth  Rectovaginal  Female rectovaginal: deferred       Procedure Note:  No notes on file    Assessment and Plan:    Taylor was seen today for annual exam.    Diagnoses and all orders for this visit:    Well woman exam with routine gynecological exam    PCOS (polycystic ovarian syndrome)  -     metFORMIN (GLUCOPHAGE) 1000 MG tablet; Take 1 tablet by mouth Daily With Breakfast.  -     Pap IG, Rfx HPV ASCU; Future    Vaginal yeast infection  -     fluconazole (DIFLUCAN) 150 MG tablet; Take 1 tablet by mouth Every 3 (Three) Days for 2 doses.        Pap was done today. If she does not receive the results of the Pap within 2 weeks  time, she was instructed to call to find out the results.      Medication refills provided. Vaginal discharge consistent with recurrent yeast infection, diflucan sent to local pharmacy.     She was recommended to begin mammograms at age 40 for breast cancer screening, colonoscopy at age 50 for colon cancer screening and bone density scans (DEXA) at age 60-65 for osteoporosis screening.      Return to clinic in 1 year for Annual exam.      MACIEJ Escamilla      Note: Speech recognition transcription software was used to dictate portions of this document.  An attempt at proofreading has been made though minor  errors in transcription may still be present.  Please do not hesitate to call our office with any questions.

## 2019-03-20 DIAGNOSIS — E28.2 PCOS (POLYCYSTIC OVARIAN SYNDROME): Primary | ICD-10-CM

## 2019-03-20 DIAGNOSIS — Z01.419 WELL WOMAN EXAM WITH ROUTINE GYNECOLOGICAL EXAM: ICD-10-CM

## 2019-03-20 DIAGNOSIS — E55.9 VITAMIN D DEFICIENCY: ICD-10-CM

## 2019-03-25 DIAGNOSIS — R30.0 DYSURIA: Primary | ICD-10-CM

## 2019-03-25 DIAGNOSIS — Z87.442 HISTORY OF KIDNEY STONES: ICD-10-CM

## 2019-03-26 ENCOUNTER — LAB (OUTPATIENT)
Dept: LAB | Facility: HOSPITAL | Age: 29
End: 2019-03-26

## 2019-03-26 DIAGNOSIS — E55.9 VITAMIN D DEFICIENCY: ICD-10-CM

## 2019-03-26 DIAGNOSIS — Z01.419 WELL WOMAN EXAM WITH ROUTINE GYNECOLOGICAL EXAM: ICD-10-CM

## 2019-03-26 DIAGNOSIS — E28.2 PCOS (POLYCYSTIC OVARIAN SYNDROME): ICD-10-CM

## 2019-03-26 LAB
25(OH)D3 SERPL-MCNC: 37.4 NG/ML
ARTICHOKE IGE QN: 145 MG/DL (ref 0–130)
CHOLEST SERPL-MCNC: 183 MG/DL (ref 0–200)
ESTRADIOL SERPL HS-MCNC: 56 PG/ML
FSH SERPL-ACNC: 4.2 MIU/ML
HDLC SERPL-MCNC: 41 MG/DL (ref 40–60)
LH SERPL-ACNC: 8.1 MIU/ML
PROLACTIN SERPL-MCNC: 7.9 NG/ML (ref 4.79–23.3)
TRIGL SERPL-MCNC: 127 MG/DL (ref 0–150)
TSH SERPL DL<=0.05 MIU/L-ACNC: 2.45 MIU/ML (ref 0.35–5.35)
VIT B12 BLD-MCNC: 629 PG/ML (ref 211–911)

## 2019-03-26 PROCEDURE — 82626 DEHYDROEPIANDROSTERONE: CPT

## 2019-03-26 PROCEDURE — 83002 ASSAY OF GONADOTROPIN (LH): CPT

## 2019-03-26 PROCEDURE — 82670 ASSAY OF TOTAL ESTRADIOL: CPT

## 2019-03-26 PROCEDURE — 80061 LIPID PANEL: CPT

## 2019-03-26 PROCEDURE — 84402 ASSAY OF FREE TESTOSTERONE: CPT

## 2019-03-26 PROCEDURE — 82306 VITAMIN D 25 HYDROXY: CPT

## 2019-03-26 PROCEDURE — 83525 ASSAY OF INSULIN: CPT

## 2019-03-26 PROCEDURE — 36415 COLL VENOUS BLD VENIPUNCTURE: CPT

## 2019-03-26 PROCEDURE — 83001 ASSAY OF GONADOTROPIN (FSH): CPT

## 2019-03-26 PROCEDURE — 84403 ASSAY OF TOTAL TESTOSTERONE: CPT

## 2019-03-26 PROCEDURE — 82607 VITAMIN B-12: CPT

## 2019-03-26 PROCEDURE — 84443 ASSAY THYROID STIM HORMONE: CPT

## 2019-03-26 PROCEDURE — 84146 ASSAY OF PROLACTIN: CPT

## 2019-03-27 LAB
INSULIN SERPL-ACNC: 64.3 UIU/ML (ref 2.6–24.9)
TESTOST FREE SERPL-MCNC: 1.9 PG/ML (ref 0–4.2)
TESTOST SERPL-MCNC: 62 NG/DL (ref 8–48)

## 2019-03-27 RX ORDER — SERTRALINE HYDROCHLORIDE 100 MG/1
100 TABLET, FILM COATED ORAL DAILY
Qty: 30 TABLET | Refills: 5 | Status: SHIPPED | OUTPATIENT
Start: 2019-03-27 | End: 2020-01-03

## 2019-03-27 RX ORDER — ONDANSETRON 4 MG/1
4 TABLET, FILM COATED ORAL EVERY 6 HOURS PRN
Qty: 20 TABLET | Refills: 5 | Status: SHIPPED | OUTPATIENT
Start: 2019-03-27 | End: 2020-04-07 | Stop reason: SDUPTHER

## 2019-03-27 RX ORDER — MEDROXYPROGESTERONE ACETATE 10 MG/1
10 TABLET ORAL DAILY
Qty: 10 TABLET | Refills: 12 | Status: SHIPPED | OUTPATIENT
Start: 2019-03-27 | End: 2020-04-07 | Stop reason: SDUPTHER

## 2019-03-29 LAB — DHEA SERPL-MCNC: 257 NG/DL (ref 31–701)

## 2019-04-08 RX ORDER — PHENTERMINE HYDROCHLORIDE 15 MG/1
15 CAPSULE ORAL 2 TIMES DAILY
Qty: 60 CAPSULE | Refills: 2 | Status: SHIPPED | OUTPATIENT
Start: 2019-04-08 | End: 2020-03-09

## 2019-05-07 RX ORDER — FLUCONAZOLE 150 MG/1
150 TABLET ORAL
Qty: 2 TABLET | Refills: 2 | Status: SHIPPED | OUTPATIENT
Start: 2019-05-07 | End: 2019-09-26

## 2019-05-23 ENCOUNTER — OFFICE VISIT (OUTPATIENT)
Dept: UROLOGY | Facility: CLINIC | Age: 29
End: 2019-05-23

## 2019-05-23 VITALS
WEIGHT: 241 LBS | TEMPERATURE: 98.3 F | HEIGHT: 64 IN | SYSTOLIC BLOOD PRESSURE: 138 MMHG | OXYGEN SATURATION: 97 % | BODY MASS INDEX: 41.15 KG/M2 | DIASTOLIC BLOOD PRESSURE: 82 MMHG | HEART RATE: 84 BPM

## 2019-05-23 DIAGNOSIS — R31.0 GROSS HEMATURIA: ICD-10-CM

## 2019-05-23 DIAGNOSIS — E13.8 DIABETES MELLITUS OF OTHER TYPE WITH COMPLICATION, UNSPECIFIED WHETHER LONG TERM INSULIN USE: ICD-10-CM

## 2019-05-23 DIAGNOSIS — N20.0 KIDNEY STONE: Primary | ICD-10-CM

## 2019-05-23 LAB
BILIRUB BLD-MCNC: NEGATIVE MG/DL
CLARITY, POC: CLEAR
COLOR UR: YELLOW
GLUCOSE UR STRIP-MCNC: NEGATIVE MG/DL
KETONES UR QL: NEGATIVE
LEUKOCYTE EST, POC: NEGATIVE
NITRITE UR-MCNC: NEGATIVE MG/ML
PH UR: 6.5 [PH] (ref 5–8)
PROT UR STRIP-MCNC: ABNORMAL MG/DL
RBC # UR STRIP: NEGATIVE /UL
SP GR UR: 1.02 (ref 1–1.03)
UROBILINOGEN UR QL: NORMAL

## 2019-05-23 PROCEDURE — 81003 URINALYSIS AUTO W/O SCOPE: CPT | Performed by: UROLOGY

## 2019-05-23 PROCEDURE — 99244 OFF/OP CNSLTJ NEW/EST MOD 40: CPT | Performed by: UROLOGY

## 2019-05-23 NOTE — PROGRESS NOTES
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 Last stone in 2016. Still has intermittent mild right nonradiating flank pain and gross hematuria.     She. She presents today for follow up.  She has dull, intermittent, nonradiating right flank pain, that is sometimes associated with nausea.  No fever, chills, nausea, vomiting today.  No dysuria.  She has had gross hematuria last week  She has daily dysuria. Worse when her urine is concentrated.  This goes away when she drinks more fluids.  No increased pain with spicy foods or alcohol.     Stone related history:  Family history of kidney stones  yes  Renal disease or anatomic abnormality: no  Malabsorptive disease or gastric bypass: no  Frequent UTI's    no  Parathyroid disease    no    Dietary Considerations  Soda - 1 per day  Fast food - 0-1 per week  Water - 64 oz per day  Doesn't add salt    Past Medical History  Past Medical History:   Diagnosis Date   • BMI 40.0-44.9, adult (CMS/[facility])    • Chronic cholecystitis    • PCOS (polycystic ovarian syndrome)        Past Surgical History  Past Surgical History:   Procedure Laterality Date   • LAPAROSCOPIC CHOLECYSTECTOMY     • WISDOM TOOTH EXTRACTION         Medications    Current Outpatient Medications:   •  albuterol (PROVENTIL HFA;VENTOLIN HFA) 108 (90 Base) MCG/ACT inhaler, Inhale 2 puffs Every 4 (Four) Hours As Needed for Wheezing., Disp: 18 g, Rfl: 2  •  fluconazole (DIFLUCAN) 150 MG tablet, Take 1 tablet by mouth Every 3 (Three) Days for 2 doses., Disp: 2 tablet, Rfl: 2  •  lansoprazole (PREVACID) 30 MG capsule, Take 1 capsule by mouth Daily., Disp: 30 capsule, Rfl: 5  •  medroxyPROGESTERone (PROVERA) 10 MG tablet, Take 1 tablet by mouth Daily x10 days each month as instructed, Disp: 10 tablet, Rfl: 12  •  metFORMIN (GLUCOPHAGE) 1000 MG tablet, Take 1 tablet by mouth Daily With Breakfast., Disp: 30 tablet, Rfl: 5  •  mupirocin  (BACTROBAN) 2 % ointment, Apply topically to the appropriate area as directed 3 (Three) Times a Day., Disp: 30 g, Rfl: 2  •  nystatin (MYCOSTATIN) 134970 UNIT/GM powder, Apply  topically 3 (Three) Times a Day As Needed (itching/irritation)., Disp: 30 g, Rfl: 0  •  ondansetron (ZOFRAN) 4 MG tablet, Take 1 tablet by mouth Every 6 Hours As Needed for Nausea or Vomiting., Disp: 20 tablet, Rfl: 5  •  phentermine 15 MG capsule, Take 1 capsule by mouth 2 (Two) Times a Day., Disp: 60 capsule, Rfl: 2  •  sertraline (ZOLOFT) 100 MG tablet, Take 1 tablet by mouth Daily., Disp: 30 tablet, Rfl: 5  •  spironolactone (ALDACTONE) 50 MG tablet, Take one tablet by mouth daily, Disp: 30 tablet, Rfl: 5  •  vitamin D (ERGOCALCIFEROL) 49227 units capsule capsule, Take 1 capsule by mouth 1 (One) Time Per Week., Disp: 4 capsule, Rfl: 3  •  benzonatate (TESSALON PERLES) 100 MG capsule, Take 1 capsule by mouth 3 (Three) Times a Day As Needed for Cough., Disp: 30 capsule, Rfl: 0    Allergies  No Known Allergies    Social History  Social History     Socioeconomic History   • Marital status: Single     Spouse name: Not on file   • Number of children: Not on file   • Years of education: Not on file   • Highest education level: Not on file   Tobacco Use   • Smoking status: Former Smoker     Packs/day: 1.00     Years: 6.00     Pack years: 6.00     Types: Cigarettes     Start date: 2/15/2008     Last attempt to quit: 2/15/2014     Years since quittin.2   • Smokeless tobacco: Never Used   Substance and Sexual Activity   • Alcohol use: Yes     Comment: Occasional   • Drug use: No   • Sexual activity: Yes     Partners: Male     Birth control/protection: OCP       Family History  Family History   Problem Relation Age of Onset   • Thyroid disease Mother    • Hypertension Maternal Grandmother    • Stroke Maternal Grandmother    • Lymphoma Paternal Grandfather 65        [facility] Lymphoma   • Breast cancer Other    • Stomach cancer Other    • Heart valve  "disorder Father    • No Known Problems Brother    • Bone cancer Paternal Aunt 50        osteosarcoma?   • Hyperlipidemia Maternal Grandfather    • No Known Problems Paternal Grandmother        Review of Systems  Constitutional: No fevers or chills  Skin: Negative for rash  Endocrine: No heat/cold intolerance   Cardiovascular: Negative for chest pain or dyspnea on exertion  Respiratory: Negative for shortness of breath or wheezing  Gastrointestinal: No constipation, nausea or vomiting  Genitourinary: No gross hematuria today  Musculoskeletal: Negative for low back pain  Neurological:  Negative for frequent headaches or dizziness  Lymph/Heme: Negative for leg swelling or calf pain.    All other systems reviewed and negative    Physical Exam  Visit Vitals  /82   Pulse 84   Temp 98.3 °F (36.8 °C) (Temporal)   Ht 162.6 cm (64.02\")   Wt 109 kg (241 lb)   SpO2 97%   BMI 41.35 kg/m²     Constitutional: NAD, WDWN.   HEENT: NCAT. Conjunctivae normal.  MMM.  Endocrine: no clear thyromegaly    Cardiovascular: Regular rate.  Pulmonary/Chest: Respirations are even and non-labored bilaterally.  Back:  no CVA tenderness.  Neurological: A + O x 3.  Cranial Nerves II-XII grossly intact.  Extremities: ILIANA x 4, Warm. No clubbing.  No cyanosis.    Skin: Pink, warm and dry.  No rashes noted.    Labs  Lab Results   Component Value Date    GLUCOSE 82 09/06/2017    BUN 14 09/06/2017    CREATININE 0.60 09/06/2017    EGFRIFNONA 121 09/06/2017    BCR 23.3 09/06/2017    K 5.0 09/06/2017    CO2 29.0 09/06/2017    CALCIUM 9.7 09/06/2017    ALBUMIN 4.30 09/06/2017    AST 18 09/06/2017    ALT 27 09/06/2017       Lab Results   Component Value Date    WBC 14.8 (H) 03/24/2015    HGB 13.9 03/24/2015    HCT 41 03/24/2015    MCV 84.2 03/24/2015     (H) 03/24/2015       No results found for: LDH, URICACID    Lab Results   Component Value Date    CALCIUM 9.7 09/06/2017       Brief Urine Lab Results  (Last result in the past 365 days)      " Color   Clarity   Blood   Leuk Est   Nitrite   Protein   CREAT   Urine HCG        05/23/19 1043 Yellow Clear Negative Negative Negative 1+             Lab Results   Component Value Date    HGBA1C 5.20 09/06/2017        )No components found for: STONEANALYSI      Radiologic Studies       I have personally reviewed these labs and images.      Assessment  Ms. Rivas is a 28 y.o. female with Hx of stones, gross hematuria and flank pain.     Plan  1. CT Urogram and cystoscopy in office

## 2019-05-24 RX ORDER — NYSTATIN AND TRIAMCINOLONE ACETONIDE 100000; 1 [USP'U]/G; MG/G
OINTMENT TOPICAL 2 TIMES DAILY
Qty: 60 G | Refills: 2 | Status: SHIPPED | OUTPATIENT
Start: 2019-05-24 | End: 2020-03-09

## 2019-05-25 LAB
BACTERIA UR CULT: NO GROWTH
BACTERIA UR CULT: NORMAL

## 2019-05-28 ENCOUNTER — RESULTS ENCOUNTER (OUTPATIENT)
Dept: UROLOGY | Facility: CLINIC | Age: 29
End: 2019-05-28

## 2019-05-28 DIAGNOSIS — R31.0 GROSS HEMATURIA: ICD-10-CM

## 2019-06-03 DIAGNOSIS — R31.0 GROSS HEMATURIA: Primary | ICD-10-CM

## 2019-06-04 ENCOUNTER — HOSPITAL ENCOUNTER (OUTPATIENT)
Dept: CT IMAGING | Facility: HOSPITAL | Age: 29
Discharge: HOME | End: 2019-06-04
Admitting: UROLOGY

## 2019-06-04 DIAGNOSIS — R31.0 GROSS HEMATURIA: ICD-10-CM

## 2019-06-04 PROCEDURE — 74178 CT ABD&PLV WO CNTR FLWD CNTR: CPT

## 2019-06-04 PROCEDURE — 0 IOPAMIDOL PER 1 ML: Performed by: UROLOGY

## 2019-06-04 RX ADMIN — IOPAMIDOL 150 ML: 755 INJECTION, SOLUTION INTRAVENOUS at 11:35

## 2019-06-21 ENCOUNTER — APPOINTMENT (OUTPATIENT)
Dept: PREADMISSION TESTING | Facility: HOSPITAL | Age: 29
End: 2019-06-21

## 2019-06-21 ENCOUNTER — TELEPHONE (OUTPATIENT)
Dept: UROLOGY | Facility: CLINIC | Age: 29
End: 2019-06-21

## 2019-06-21 ENCOUNTER — PROCEDURE VISIT (OUTPATIENT)
Dept: UROLOGY | Facility: CLINIC | Age: 29
End: 2019-06-21

## 2019-06-21 VITALS
HEART RATE: 79 BPM | HEIGHT: 64 IN | DIASTOLIC BLOOD PRESSURE: 75 MMHG | BODY MASS INDEX: 40.19 KG/M2 | OXYGEN SATURATION: 98 % | SYSTOLIC BLOOD PRESSURE: 120 MMHG | WEIGHT: 235.38 LBS

## 2019-06-21 VITALS — BODY MASS INDEX: 41.15 KG/M2 | RESPIRATION RATE: 16 BRPM | WEIGHT: 241 LBS | HEIGHT: 64 IN

## 2019-06-21 DIAGNOSIS — E27.8 ADRENAL NODULE (HCC): ICD-10-CM

## 2019-06-21 DIAGNOSIS — N20.0 NEPHROLITHIASIS: ICD-10-CM

## 2019-06-21 DIAGNOSIS — R31.0 GROSS HEMATURIA: Primary | ICD-10-CM

## 2019-06-21 LAB
ANION GAP SERPL CALCULATED.3IONS-SCNC: 16.1 MMOL/L (ref 10–20)
BACTERIA UR QL AUTO: ABNORMAL /HPF
BILIRUB UR QL STRIP: NEGATIVE
BUN BLD-MCNC: 12 MG/DL (ref 7–20)
BUN/CREAT SERPL: 20 (ref 7.1–23.5)
CALCIUM SPEC-SCNC: 9.3 MG/DL (ref 8.4–10.2)
CHLORIDE SERPL-SCNC: 100 MMOL/L (ref 98–107)
CLARITY UR: CLEAR
CO2 SERPL-SCNC: 26 MMOL/L (ref 26–30)
COLOR UR: YELLOW
CREAT BLD-MCNC: 0.6 MG/DL (ref 0.6–1.3)
DEPRECATED RDW RBC AUTO: 39.8 FL (ref 37–54)
ERYTHROCYTE [DISTWIDTH] IN BLOOD BY AUTOMATED COUNT: 12.9 % (ref 12.3–15.4)
GFR SERPL CREATININE-BSD FRML MDRD: 119 ML/MIN/1.73
GLUCOSE BLD-MCNC: 70 MG/DL (ref 74–98)
GLUCOSE UR STRIP-MCNC: NEGATIVE MG/DL
HCT VFR BLD AUTO: 41.6 % (ref 34–46.6)
HGB BLD-MCNC: 13.7 G/DL (ref 12–15.9)
HGB UR QL STRIP.AUTO: ABNORMAL
HYALINE CASTS UR QL AUTO: ABNORMAL /LPF
KETONES UR QL STRIP: NEGATIVE
LEUKOCYTE ESTERASE UR QL STRIP.AUTO: NEGATIVE
MCH RBC QN AUTO: 28.1 PG (ref 26.6–33)
MCHC RBC AUTO-ENTMCNC: 32.9 G/DL (ref 31.5–35.7)
MCV RBC AUTO: 85.2 FL (ref 79–97)
NITRITE UR QL STRIP: NEGATIVE
PH UR STRIP.AUTO: 6.5 [PH] (ref 5–8)
PLATELET # BLD AUTO: 399 10*3/MM3 (ref 140–450)
PMV BLD AUTO: 9.3 FL (ref 6–12)
POTASSIUM BLD-SCNC: 4.1 MMOL/L (ref 3.5–5.1)
PROT UR QL STRIP: NEGATIVE
RBC # BLD AUTO: 4.88 10*6/MM3 (ref 3.77–5.28)
RBC # UR: ABNORMAL /HPF
REF LAB TEST METHOD: ABNORMAL
SODIUM BLD-SCNC: 138 MMOL/L (ref 137–145)
SP GR UR STRIP: <=1.005 (ref 1–1.03)
SQUAMOUS #/AREA URNS HPF: ABNORMAL /HPF
UROBILINOGEN UR QL STRIP: ABNORMAL
WBC NRBC COR # BLD: 12.88 10*3/MM3 (ref 3.4–10.8)
WBC UR QL AUTO: ABNORMAL /HPF

## 2019-06-21 PROCEDURE — 80048 BASIC METABOLIC PNL TOTAL CA: CPT | Performed by: UROLOGY

## 2019-06-21 PROCEDURE — 81001 URINALYSIS AUTO W/SCOPE: CPT | Performed by: UROLOGY

## 2019-06-21 PROCEDURE — 52000 CYSTOURETHROSCOPY: CPT | Performed by: UROLOGY

## 2019-06-21 PROCEDURE — 99213 OFFICE O/P EST LOW 20 MIN: CPT | Performed by: UROLOGY

## 2019-06-21 PROCEDURE — 36415 COLL VENOUS BLD VENIPUNCTURE: CPT

## 2019-06-21 PROCEDURE — 85027 COMPLETE CBC AUTOMATED: CPT | Performed by: UROLOGY

## 2019-06-21 RX ORDER — SODIUM CHLORIDE, SODIUM LACTATE, POTASSIUM CHLORIDE, CALCIUM CHLORIDE 600; 310; 30; 20 MG/100ML; MG/100ML; MG/100ML; MG/100ML
100 INJECTION, SOLUTION INTRAVENOUS CONTINUOUS
Status: CANCELLED | OUTPATIENT
Start: 2019-06-21

## 2019-06-21 RX ORDER — DEXAMETHASONE 1 MG
1 TABLET ORAL ONCE
Qty: 1 TABLET | Refills: 0 | Status: SHIPPED | OUTPATIENT
Start: 2019-06-21 | End: 2019-06-26

## 2019-06-21 RX ORDER — MELATONIN
1000 DAILY
COMMUNITY
End: 2021-01-07 | Stop reason: SDUPTHER

## 2019-06-21 NOTE — PROGRESS NOTES
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 Last stone in 2016. Still has intermittent mild right nonradiating flank pain and gross hematuria.     She. She presents today for follow up.  She still has dull, intermittent, nonradiating right flank pain, that is sometimes associated with nausea.    No fever, chills, nausea, vomiting today.  No dysuria.  The decision was made today to proceed with cystoscopy just to rule out any bladder pathology prior to any surgical procedures.  She has a CT urogram, which shows bilateral nephrolithiasis, but really the only appreciable stone burden on the right side, with a 1 cm right renal stone.    To review,  She has had gross hematuria last week  She has daily dysuria. Worse when her urine is concentrated.  This goes away when she drinks more fluids.  No increased pain with spicy foods or alcohol.     Stone related history:  Family history of kidney stones  yes  Renal disease or anatomic abnormality: no  Malabsorptive disease or gastric bypass: no  Frequent UTI's    no  Parathyroid disease    no    Dietary Considerations  Soda - 1 per day  Fast food - 0-1 per week  Water - 64 oz per day  Doesn't add salt    Past Medical History  Past Medical History:   Diagnosis Date   • BMI 40.0-44.9, adult (CMS/[facility])    • PCOS (polycystic ovarian syndrome)        Past Surgical History  Past Surgical History:   Procedure Laterality Date   • LAPAROSCOPIC CHOLECYSTECTOMY     • WISDOM TOOTH EXTRACTION         Medications    Current Outpatient Medications:   •  albuterol (PROVENTIL HFA;VENTOLIN HFA) 108 (90 Base) MCG/ACT inhaler, Inhale 2 puffs Every 4 (Four) Hours As Needed for Wheezing., Disp: 18 g, Rfl: 2  •  benzonatate (TESSALON PERLES) 100 MG capsule, Take 1 capsule by mouth 3 (Three) Times a Day As Needed for Cough., Disp: 30 capsule, Rfl: 0  •  lansoprazole (PREVACID) 30 MG capsule, Take 1 capsule by mouth  Daily., Disp: 30 capsule, Rfl: 5  •  medroxyPROGESTERone (PROVERA) 10 MG tablet, Take 1 tablet by mouth Daily x10 days each month as instructed, Disp: 10 tablet, Rfl: 12  •  metFORMIN (GLUCOPHAGE) 1000 MG tablet, Take 1 tablet by mouth Daily With Breakfast., Disp: 30 tablet, Rfl: 5  •  mupirocin (BACTROBAN) 2 % ointment, Apply topically to the appropriate area as directed 3 (Three) Times a Day., Disp: 30 g, Rfl: 2  •  nystatin (MYCOSTATIN) 404285 UNIT/GM powder, Apply  topically 3 (Three) Times a Day As Needed (itching/irritation)., Disp: 30 g, Rfl: 0  •  nystatin-triamcinolone (MYCOLOG) 345357-7.1 UNIT/GM-% ointment, Apply  topically to the appropriate area as directed 2 (Two) Times a Day., Disp: 60 g, Rfl: 2  •  ondansetron (ZOFRAN) 4 MG tablet, Take 1 tablet by mouth Every 6 Hours As Needed for Nausea or Vomiting., Disp: 20 tablet, Rfl: 5  •  phentermine 15 MG capsule, Take 1 capsule by mouth 2 (Two) Times a Day., Disp: 60 capsule, Rfl: 2  •  sertraline (ZOLOFT) 100 MG tablet, Take 1 tablet by mouth Daily., Disp: 30 tablet, Rfl: 5  •  spironolactone (ALDACTONE) 50 MG tablet, Take one tablet by mouth daily, Disp: 30 tablet, Rfl: 5  •  vitamin D (ERGOCALCIFEROL) 74629 units capsule capsule, Take 1 capsule by mouth 1 (One) Time Per Week., Disp: 4 capsule, Rfl: 3    Allergies  No Known Allergies    Social History  Social History     Socioeconomic History   • Marital status: Single     Spouse name: Not on file   • Number of children: Not on file   • Years of education: Not on file   • Highest education level: Associate degree: academic program   Tobacco Use   • Smoking status: Former Smoker     Packs/day: 1.00     Years: 6.00     Pack years: 6.00     Types: Cigarettes     Start date: 2/15/2008     Last attempt to quit: 2/15/2014     Years since quittin.3   • Smokeless tobacco: Never Used   Substance and Sexual Activity   • Alcohol use: Yes     Comment: Occasional   • Drug use: No   • Sexual activity: Yes      "Partners: Male     Birth control/protection: OCP       Family History  Family History   Problem Relation Age of Onset   • Thyroid disease Mother    • Hypertension Maternal Grandmother    • Stroke Maternal Grandmother    • Lymphoma Paternal Grandfather 65        NH Lymphoma   • Breast cancer Other    • Stomach cancer Other    • Heart valve disorder Father    • No Known Problems Brother    • Bone cancer Paternal Aunt 50        osteosarcoma?   • Hyperlipidemia Maternal Grandfather    • No Known Problems Paternal Grandmother        Review of Systems  Constitutional: No fevers or chills  Skin: Negative for rash  Endocrine: No heat/cold intolerance   Cardiovascular: Negative for chest pain or dyspnea on exertion  Respiratory: Negative for shortness of breath or wheezing  Gastrointestinal: No constipation, nausea or vomiting  Genitourinary: No gross hematuria today  Musculoskeletal: Negative for low back pain  Neurological:  Negative for frequent headaches or dizziness  Lymph/Heme: Negative for leg swelling or calf pain.    All other systems reviewed and negative    Physical Exam  Visit Vitals  Resp 16   Ht 162.6 cm (64.02\")   Wt 109 kg (241 lb)   BMI 41.35 kg/m²     Constitutional: NAD, WDWN.   HEENT: NCAT. Conjunctivae normal.  MMM.  Endocrine: no clear thyromegaly    Cardiovascular: Regular rate.  Pulmonary/Chest: Respirations are even and non-labored bilaterally.  Back:  no CVA tenderness.  Neurological: A + O x 3.  Cranial Nerves II-XII grossly intact.  Extremities: ILIANA x 4, Warm. No clubbing.  No cyanosis.    Skin: Pink, warm and dry.  No rashes noted.    Labs  Lab Results   Component Value Date    GLUCOSE 82 09/06/2017    BUN 14 09/06/2017    CREATININE 0.60 09/06/2017    EGFRIFNONA 121 09/06/2017    BCR 23.3 09/06/2017    K 5.0 09/06/2017    CO2 29.0 09/06/2017    CALCIUM 9.7 09/06/2017    ALBUMIN 4.30 09/06/2017    AST 18 09/06/2017    ALT 27 09/06/2017       Lab Results   Component Value Date    WBC 14.8 (H) " 03/24/2015    HGB 13.9 03/24/2015    HCT 41 03/24/2015    MCV 84.2 03/24/2015     (H) 03/24/2015       No results found for: LDH, URICACID    Lab Results   Component Value Date    CALCIUM 9.7 09/06/2017       Brief Urine Lab Results  (Last result in the past 365 days)      Color   Clarity   Blood   Leuk Est   Nitrite   Protein   CREAT   Urine HCG        05/23/19 1043 Yellow Clear Negative Negative Negative 1+             Lab Results   Component Value Date    HGBA1C 5.20 09/06/2017        )No components found for: STONEANALYSI      Radiologic Studies  Ct Abdomen Pelvis With & Without Contrast    Result Date: 6/4/2019  Impression: 1. Nonobstructing bilateral renal stones with the majority of stone burden in the right kidney. These measure up to about 3 mm. No enhancing renal mass or bladder mass. 2. Normal appendix. 3. Status post cholecystectomy. Signer Name: Alek Brown MD  Signed: 6/4/2019 2:39 PM  Workstation Name: [id number]       I have personally reviewed these labs and images.  Her right renal stone may be 3 mm and axial dimension, but it is almost 10 mm in coronal dimension.    Preprocedure diagnosis  Gross hematuria    Postprocedure diagnosis  Name    Procedure  Flexible Cystourethroscopy    Attending surgeon  Michael Valerio MD    Anesthesia  2% lidocaine jelly intraurethrally    Complications  None    Indications  28 y.o. female undergoing a flexible cystoscopy for the above mentioned indications.    Informed consent was obtained.      Findings  Cystoscopy revealed one right and left ureteral orifice in the normal anatomic position, normal bladder mucosa and no tumors, masses or stones.      Procedure  The patient was placed in supine position and prepped and draped in sterile fashion with lidocaine jelly per urethra for anesthesia.  A timeout was performed.  The 14F flexible cystoscope was lubricated and gently placed through the urethra and into the bladder.  The bladder was completely  visualized.  The cystoscope was retroflexed and the bladder neck visualized.  The scope was withdrawn and the procedure terminated.  The patient tolerated the procedure well.          Assessment  Ms. Rivas is a 28 y.o. female with Hx of stones, gross hematuria and flank pain, who has moderate volume right nephrolithiasis.  She has a 1 cm and 3 mm stone on the right side.    I informed her the risks, benefits, and alternatives to ureteroscopy with laser lithotripsy.  The main risk that we discussed were bleeding, infection, damage to the kidney or ureter, need for further procedures, or cardiopulmonary complications from anesthesia.  She voiced her understanding and wished to proceed.    Plan  1.  Schedule for cystoscopy right ureteroscopy, holmium laser lithotripsy, stent 7/1/19, PAT

## 2019-06-21 NOTE — PAT
Routine preadmission testing visit completed, as patient prepares for procedure with Dr. Valerio 07-01-19 under general anesthesia.    During health history, patient verbalized that she takes metformin daily for PCOS, but that she has never been diagnosed as diabetic, or treated for that reason.  Patient also reported that she takes spironolactone for elevated testosterone levels and acne, that this was not taken for fluid retention.      Taylor also reported that she had been Rx Phentermine 15mg bid and that she had been on this medication for the past 2 months, but that she has typically only been taking once daily secondary to it keeping her awake at night.  Last reported dose was 06-20-19.     RN consulted anesthesia services and spoke with Kenyon Conley CRNA and discussed the medication and upcoming procedure date/type of anesthesia requested.  Health consulted Jerome Guallpa CRNA and then requested that patient be off of this medication for a 2 week period prior to receiving general anesthesia.    RN phoned Dr Valerio's office and spoke with Lianet.  Notified Lianet of patient's medication regimen and of anesthesia services request that patient not have this for 2 weeks before general anesthesia.  Lianet said that she would pass this information along to Dr Valerio and that they would work on a scheduling plan.    Taylor was also notified of CRNA's request and that RN had notified Dr Valerio's nurse, and that she may anticipate that they would be calling her to make schedule adjustments.  Taylor verbalized understanding.

## 2019-06-21 NOTE — TELEPHONE ENCOUNTER
Ms. Mclaughlin,Lets please move her from July 1 to July 9.  Could you please call her to ask if that would be okay with her and then have Renee move her to follow that day?

## 2019-06-21 NOTE — TELEPHONE ENCOUNTER
ELLY gris and Taylor must be off Phentermine for 2 weeks before surgery. That will put her surgery off until after July 4th.

## 2019-06-25 ENCOUNTER — APPOINTMENT (OUTPATIENT)
Dept: LAB | Facility: HOSPITAL | Age: 29
End: 2019-06-25

## 2019-06-25 LAB
ANION GAP SERPL CALCULATED.3IONS-SCNC: 10.5 MMOL/L
BUN BLD-MCNC: 11 MG/DL (ref 6–20)
BUN/CREAT SERPL: 15.1 (ref 7–25)
CALCIUM SPEC-SCNC: 9.4 MG/DL (ref 8.6–10.5)
CHLORIDE SERPL-SCNC: 103 MMOL/L (ref 98–107)
CO2 SERPL-SCNC: 26.5 MMOL/L (ref 22–29)
CORTIS AM PEAK SERPL-MCNC: 9.11 MCG/DL
CREAT BLD-MCNC: 0.73 MG/DL (ref 0.57–1)
GFR SERPL CREATININE-BSD FRML MDRD: 95 ML/MIN/1.73
GLUCOSE BLD-MCNC: 76 MG/DL (ref 65–99)
POTASSIUM BLD-SCNC: 4.7 MMOL/L (ref 3.5–5.2)
SODIUM BLD-SCNC: 140 MMOL/L (ref 136–145)

## 2019-06-25 PROCEDURE — 36415 COLL VENOUS BLD VENIPUNCTURE: CPT | Performed by: UROLOGY

## 2019-06-25 PROCEDURE — 80048 BASIC METABOLIC PNL TOTAL CA: CPT | Performed by: UROLOGY

## 2019-06-25 PROCEDURE — 84244 ASSAY OF RENIN: CPT | Performed by: UROLOGY

## 2019-06-25 PROCEDURE — 82533 TOTAL CORTISOL: CPT | Performed by: UROLOGY

## 2019-06-25 PROCEDURE — 82088 ASSAY OF ALDOSTERONE: CPT | Performed by: UROLOGY

## 2019-06-26 ENCOUNTER — RESULTS ENCOUNTER (OUTPATIENT)
Dept: UROLOGY | Facility: CLINIC | Age: 29
End: 2019-06-26

## 2019-06-26 DIAGNOSIS — E27.8 ADRENAL NODULE (HCC): ICD-10-CM

## 2019-06-29 LAB
ALDOST SERPL-MCNC: 13 NG/DL (ref 0–30)
RENIN PLAS-CCNC: 0.41 NG/ML/HR (ref 0.17–5.38)

## 2019-07-09 ENCOUNTER — ANESTHESIA EVENT (OUTPATIENT)
Dept: PERIOP | Facility: HOSPITAL | Age: 29
End: 2019-07-09

## 2019-07-09 ENCOUNTER — ANESTHESIA (OUTPATIENT)
Dept: PERIOP | Facility: HOSPITAL | Age: 29
End: 2019-07-09

## 2019-07-09 ENCOUNTER — HOSPITAL ENCOUNTER (OUTPATIENT)
Facility: HOSPITAL | Age: 29
Setting detail: HOSPITAL OUTPATIENT SURGERY
Discharge: HOME | End: 2019-07-09
Attending: UROLOGY | Admitting: UROLOGY

## 2019-07-09 VITALS
OXYGEN SATURATION: 94 % | RESPIRATION RATE: 16 BRPM | HEART RATE: 65 BPM | DIASTOLIC BLOOD PRESSURE: 69 MMHG | SYSTOLIC BLOOD PRESSURE: 122 MMHG | TEMPERATURE: 99 F

## 2019-07-09 DIAGNOSIS — N20.0 NEPHROLITHIASIS: ICD-10-CM

## 2019-07-09 LAB
B-HCG UR QL: NEGATIVE
INTERNAL NEGATIVE CONTROL: NEGATIVE
INTERNAL POSITIVE CONTROL: POSITIVE
Lab: NORMAL

## 2019-07-09 PROCEDURE — C1758 CATHETER, URETERAL: HCPCS | Performed by: UROLOGY

## 2019-07-09 PROCEDURE — 82360 CALCULUS ASSAY QUANT: CPT | Performed by: UROLOGY

## 2019-07-09 PROCEDURE — C1894 INTRO/SHEATH, NON-LASER: HCPCS | Performed by: UROLOGY

## 2019-07-09 PROCEDURE — C1769 GUIDE WIRE: HCPCS | Performed by: UROLOGY

## 2019-07-09 PROCEDURE — 25010000002 PROPOFOL 200 MG/20ML EMULSION: Performed by: NURSE ANESTHETIST, CERTIFIED REGISTERED

## 2019-07-09 PROCEDURE — 25010000003 CEFAZOLIN PER 500 MG: Performed by: UROLOGY

## 2019-07-09 PROCEDURE — 25010000002 MIDAZOLAM PER 1 MG: Performed by: NURSE ANESTHETIST, CERTIFIED REGISTERED

## 2019-07-09 PROCEDURE — 81025 URINE PREGNANCY TEST: CPT | Performed by: UROLOGY

## 2019-07-09 PROCEDURE — 25010000002 DEXAMETHASONE PER 1 MG: Performed by: NURSE ANESTHETIST, CERTIFIED REGISTERED

## 2019-07-09 PROCEDURE — 25010000002 IOPAMIDOL 61 % SOLUTION: Performed by: UROLOGY

## 2019-07-09 PROCEDURE — 25010000002 FENTANYL CITRATE (PF) 100 MCG/2ML SOLUTION: Performed by: NURSE ANESTHETIST, CERTIFIED REGISTERED

## 2019-07-09 PROCEDURE — C2617 STENT, NON-COR, TEM W/O DEL: HCPCS | Performed by: UROLOGY

## 2019-07-09 PROCEDURE — 74420 UROGRAPHY RTRGR +-KUB: CPT | Performed by: UROLOGY

## 2019-07-09 PROCEDURE — 25010000002 ONDANSETRON PER 1 MG: Performed by: NURSE ANESTHETIST, CERTIFIED REGISTERED

## 2019-07-09 PROCEDURE — 25010000002 KETOROLAC TROMETHAMINE PER 15 MG: Performed by: NURSE ANESTHETIST, CERTIFIED REGISTERED

## 2019-07-09 PROCEDURE — 52356 CYSTO/URETERO W/LITHOTRIPSY: CPT | Performed by: UROLOGY

## 2019-07-09 PROCEDURE — 25010000002 HYDROMORPHONE 1 MG/ML SOLUTION

## 2019-07-09 DEVICE — URETERAL STENT
Type: IMPLANTABLE DEVICE | Site: URETER | Status: FUNCTIONAL
Brand: CONTOUR™

## 2019-07-09 RX ORDER — PROMETHAZINE HYDROCHLORIDE 25 MG/ML
6.25 INJECTION, SOLUTION INTRAMUSCULAR; INTRAVENOUS ONCE AS NEEDED
Status: DISCONTINUED | OUTPATIENT
Start: 2019-07-09 | End: 2019-07-09 | Stop reason: HOSPADM

## 2019-07-09 RX ORDER — DOCUSATE SODIUM 100 MG/1
100 CAPSULE, LIQUID FILLED ORAL 2 TIMES DAILY
Qty: 15 CAPSULE | Refills: 1 | Status: SHIPPED | OUTPATIENT
Start: 2019-07-09 | End: 2020-03-09

## 2019-07-09 RX ORDER — IPRATROPIUM BROMIDE AND ALBUTEROL SULFATE 2.5; .5 MG/3ML; MG/3ML
3 SOLUTION RESPIRATORY (INHALATION) ONCE AS NEEDED
Status: DISCONTINUED | OUTPATIENT
Start: 2019-07-09 | End: 2019-07-09 | Stop reason: HOSPADM

## 2019-07-09 RX ORDER — SODIUM CHLORIDE, SODIUM LACTATE, POTASSIUM CHLORIDE, CALCIUM CHLORIDE 600; 310; 30; 20 MG/100ML; MG/100ML; MG/100ML; MG/100ML
100 INJECTION, SOLUTION INTRAVENOUS CONTINUOUS
Status: DISCONTINUED | OUTPATIENT
Start: 2019-07-09 | End: 2019-07-09 | Stop reason: HOSPADM

## 2019-07-09 RX ORDER — TAMSULOSIN HYDROCHLORIDE 0.4 MG/1
1 CAPSULE ORAL NIGHTLY
Qty: 10 CAPSULE | Refills: 0 | Status: SHIPPED | OUTPATIENT
Start: 2019-07-09 | End: 2020-03-09

## 2019-07-09 RX ORDER — SODIUM CHLORIDE 0.9 % (FLUSH) 0.9 %
3 SYRINGE (ML) INJECTION AS NEEDED
Status: DISCONTINUED | OUTPATIENT
Start: 2019-07-09 | End: 2019-07-09 | Stop reason: HOSPADM

## 2019-07-09 RX ORDER — ACETAMINOPHEN 325 MG/1
650 TABLET ORAL EVERY 6 HOURS
Qty: 30 TABLET | Refills: 0 | Status: SHIPPED | OUTPATIENT
Start: 2019-07-09 | End: 2019-07-12

## 2019-07-09 RX ORDER — CIPROFLOXACIN 500 MG/1
500 TABLET, FILM COATED ORAL 2 TIMES DAILY
Qty: 6 TABLET | Refills: 0 | Status: SHIPPED | OUTPATIENT
Start: 2019-07-09 | End: 2020-03-09

## 2019-07-09 RX ORDER — DEXAMETHASONE SODIUM PHOSPHATE 4 MG/ML
INJECTION, SOLUTION INTRA-ARTICULAR; INTRALESIONAL; INTRAMUSCULAR; INTRAVENOUS; SOFT TISSUE AS NEEDED
Status: DISCONTINUED | OUTPATIENT
Start: 2019-07-09 | End: 2019-07-09 | Stop reason: SURG

## 2019-07-09 RX ORDER — PROMETHAZINE HYDROCHLORIDE 25 MG/1
25 TABLET ORAL ONCE AS NEEDED
Status: DISCONTINUED | OUTPATIENT
Start: 2019-07-09 | End: 2019-07-09 | Stop reason: HOSPADM

## 2019-07-09 RX ORDER — MIDAZOLAM HYDROCHLORIDE 1 MG/ML
INJECTION INTRAMUSCULAR; INTRAVENOUS AS NEEDED
Status: DISCONTINUED | OUTPATIENT
Start: 2019-07-09 | End: 2019-07-09 | Stop reason: SURG

## 2019-07-09 RX ORDER — MAGNESIUM HYDROXIDE 1200 MG/15ML
LIQUID ORAL AS NEEDED
Status: DISCONTINUED | OUTPATIENT
Start: 2019-07-09 | End: 2019-07-09 | Stop reason: HOSPADM

## 2019-07-09 RX ORDER — ONDANSETRON 2 MG/ML
4 INJECTION INTRAMUSCULAR; INTRAVENOUS ONCE AS NEEDED
Status: DISCONTINUED | OUTPATIENT
Start: 2019-07-09 | End: 2019-07-09 | Stop reason: HOSPADM

## 2019-07-09 RX ORDER — FENTANYL CITRATE 50 UG/ML
INJECTION, SOLUTION INTRAMUSCULAR; INTRAVENOUS AS NEEDED
Status: DISCONTINUED | OUTPATIENT
Start: 2019-07-09 | End: 2019-07-09 | Stop reason: SURG

## 2019-07-09 RX ORDER — SCOLOPAMINE TRANSDERMAL SYSTEM 1 MG/1
1 PATCH, EXTENDED RELEASE TRANSDERMAL
Status: DISCONTINUED | OUTPATIENT
Start: 2019-07-09 | End: 2019-07-09 | Stop reason: HOSPADM

## 2019-07-09 RX ORDER — OXYBUTYNIN CHLORIDE 10 MG/1
10 TABLET, EXTENDED RELEASE ORAL DAILY PRN
Qty: 10 TABLET | Refills: 0 | Status: SHIPPED | OUTPATIENT
Start: 2019-07-09 | End: 2020-03-09

## 2019-07-09 RX ORDER — KETOROLAC TROMETHAMINE 30 MG/ML
INJECTION, SOLUTION INTRAMUSCULAR; INTRAVENOUS AS NEEDED
Status: DISCONTINUED | OUTPATIENT
Start: 2019-07-09 | End: 2019-07-09 | Stop reason: SURG

## 2019-07-09 RX ORDER — PROPOFOL 10 MG/ML
INJECTION, EMULSION INTRAVENOUS AS NEEDED
Status: DISCONTINUED | OUTPATIENT
Start: 2019-07-09 | End: 2019-07-09 | Stop reason: SURG

## 2019-07-09 RX ORDER — OXYCODONE HYDROCHLORIDE 5 MG/1
5 TABLET ORAL EVERY 6 HOURS PRN
Qty: 10 TABLET | Refills: 0 | Status: SHIPPED | OUTPATIENT
Start: 2019-07-09 | End: 2020-03-09

## 2019-07-09 RX ORDER — PROMETHAZINE HYDROCHLORIDE 25 MG/1
25 SUPPOSITORY RECTAL ONCE AS NEEDED
Status: DISCONTINUED | OUTPATIENT
Start: 2019-07-09 | End: 2019-07-09 | Stop reason: HOSPADM

## 2019-07-09 RX ORDER — ONDANSETRON 2 MG/ML
INJECTION INTRAMUSCULAR; INTRAVENOUS AS NEEDED
Status: DISCONTINUED | OUTPATIENT
Start: 2019-07-09 | End: 2019-07-09 | Stop reason: SURG

## 2019-07-09 RX ORDER — MEPERIDINE HYDROCHLORIDE 25 MG/ML
12.5 INJECTION INTRAMUSCULAR; INTRAVENOUS; SUBCUTANEOUS
Status: DISCONTINUED | OUTPATIENT
Start: 2019-07-09 | End: 2019-07-09 | Stop reason: HOSPADM

## 2019-07-09 RX ORDER — LIDOCAINE HYDROCHLORIDE 20 MG/ML
INJECTION, SOLUTION INTRAVENOUS AS NEEDED
Status: DISCONTINUED | OUTPATIENT
Start: 2019-07-09 | End: 2019-07-09 | Stop reason: SURG

## 2019-07-09 RX ADMIN — DEXAMETHASONE SODIUM PHOSPHATE 4 MG: 4 INJECTION, SOLUTION INTRAMUSCULAR; INTRAVENOUS at 11:45

## 2019-07-09 RX ADMIN — HYDROMORPHONE HYDROCHLORIDE 0.5 MG: 1 INJECTION, SOLUTION INTRAMUSCULAR; INTRAVENOUS; SUBCUTANEOUS at 12:40

## 2019-07-09 RX ADMIN — CEFAZOLIN 2 G: 1 INJECTION, POWDER, FOR SOLUTION INTRAVENOUS at 11:04

## 2019-07-09 RX ADMIN — FENTANYL CITRATE 50 MCG: 50 INJECTION, SOLUTION INTRAMUSCULAR; INTRAVENOUS at 11:38

## 2019-07-09 RX ADMIN — ONDANSETRON 4 MG: 2 INJECTION INTRAMUSCULAR; INTRAVENOUS at 11:45

## 2019-07-09 RX ADMIN — SODIUM CHLORIDE, POTASSIUM CHLORIDE, SODIUM LACTATE AND CALCIUM CHLORIDE: 600; 310; 30; 20 INJECTION, SOLUTION INTRAVENOUS at 11:40

## 2019-07-09 RX ADMIN — SCOPALAMINE 1 PATCH: 1 PATCH, EXTENDED RELEASE TRANSDERMAL at 09:04

## 2019-07-09 RX ADMIN — SODIUM CHLORIDE, POTASSIUM CHLORIDE, SODIUM LACTATE AND CALCIUM CHLORIDE 100 ML/HR: 600; 310; 30; 20 INJECTION, SOLUTION INTRAVENOUS at 08:45

## 2019-07-09 RX ADMIN — PROPOFOL 50 MG: 10 INJECTION, EMULSION INTRAVENOUS at 11:38

## 2019-07-09 RX ADMIN — LIDOCAINE HYDROCHLORIDE 60 MG: 20 INJECTION, SOLUTION INTRAVENOUS at 11:01

## 2019-07-09 RX ADMIN — PROPOFOL 200 MG: 10 INJECTION, EMULSION INTRAVENOUS at 11:01

## 2019-07-09 RX ADMIN — FENTANYL CITRATE 50 MCG: 50 INJECTION, SOLUTION INTRAMUSCULAR; INTRAVENOUS at 11:05

## 2019-07-09 RX ADMIN — MIDAZOLAM HYDROCHLORIDE 2 MG: 1 INJECTION, SOLUTION INTRAMUSCULAR; INTRAVENOUS at 10:56

## 2019-07-09 RX ADMIN — Medication 0.5 MG: at 12:40

## 2019-07-09 RX ADMIN — KETOROLAC TROMETHAMINE 30 MG: 30 INJECTION, SOLUTION INTRAMUSCULAR at 11:45

## 2019-07-09 NOTE — ANESTHESIA POSTPROCEDURE EVALUATION
Patient: Taylor Rivas    Procedure Summary     Date:  07/09/19 Room / Location:  [facility] /  [facility] OR    Anesthesia Start:  1056 Anesthesia Stop:  1153    Procedure:  CYSTOSCOPY, URETEROSCOPY LASER LITHOTRIPSY WITH BASKET EXTRACTION OF STONE FRAGMENTS, RETROGRADE PYELOGRAM AND STENT INSERTION (Right ) Diagnosis:       Nephrolithiasis      (Nephrolithiasis [N20.0])    Surgeon:  Michael Valerio MD Provider:  Nadine Diana CRNA    Anesthesia Type:  general ASA Status:  3          Anesthesia Type: general  Last vitals  BP   116/74 (07/09/19 1315)   Temp   99 °F (37.2 °C) (07/09/19 1315)   Pulse   61 (07/09/19 1315)   Resp   16 (07/09/19 1315)     SpO2   94 % (07/09/19 1315)     Post Anesthesia Care and Evaluation    Patient location during evaluation: PHASE II  Patient participation: complete - patient participated  Level of consciousness: awake and alert  Pain score: 0  Pain management: satisfactory to patient  Airway patency: patent  Anesthetic complications: No anesthetic complications  PONV Status: none  Cardiovascular status: acceptable and stable  Respiratory status: acceptable  Hydration status: acceptable

## 2019-07-09 NOTE — ANESTHESIA PROCEDURE NOTES
Airway  Urgency: elective      General Information and Staff    Patient location during procedure: OR  CRNA: Nadine Diana CRNA    Indications and Patient Condition    Preoxygenated: yes  Mask difficulty assessment: 1 - vent by mask    Final Airway Details  Final airway type: supraglottic airway      Successful airway: classic  Size 4  Airway Seal Pressure (cm H2O): 20    Number of attempts at approach: 1    Additional Comments  LMA seats well, teeth as pre-op

## 2019-07-09 NOTE — OP NOTE
Preoperative diagnosis  right kidney stone    Postoperative diagnosis  right kidney stone    Procedure performed  1.  Flexible cystoscopy, right retrograde pyelogram with ureteral stent placement 6 Fr x 24 cm  2.  right ureteroscopy with holmium-YAG laser lithotripsy and basket extraction of stone fragments (49908)   3.  Fluoroscopy time < 1 hour with interpretation of images    Surgeon  Michael Valerio MD    Anesthesia  General    Complications  None    Specimen  Stone fragments for biochemical analysis    Findings  Urethroscopy revealed no strictures or other abnormalities.  Cystoscopy revealed no tumors, stones or other mucosal abnormalities.    right retrograde pyelogram revealed a delicate system with identification of the stone seen on pre-op imaging.  No other filling defects and caliber of right ureter was smooth and normal.    Ureteroscopy revealed the stones seen on preoperative imaging.  All fragments were removed.     Indications  28 y.o. female with history of nonobstructing right renal stones agreed to undergo the above named procedure after discussion of the alternatives, risks and benefits.  Informed consent was obtained.      Procedure  The patient was taken to the operating room and placed supine on the operating table.  Pre-operative antibiotics were administered.  Bilateral lower extremity SCDs were placed.  After induction of general anesthesia the patient was positioned in dorsal lithotomy, prepped and draped in a sterile fashion.  A time-out was performed.      A 14-English flexible cystoscope was passed carefully via urethra into the bladder.  The right ureteral orifice was identified and a Sensor wire was passed retrograde to the level of the kidney and confirmed by fluoroscopy.  The flexible scope was off-loaded and the bladder emptied with a straight catheter.  A dual lumen open-ended catheter was passed over the wire. A retrograde pyelogram was performed by slowly injecting 5 mL of 50%  Omnipaque contrast via the 5-Portuguese catheter with findings described above.  An Amplatz super-stiff was placed to the level of the renal pelvis and confirmed by fluoroscopy. The dual lumen was removed. The Sensor wire was clipped to the drape as a safety wire.  An 11/13, 36-cm access sheath was advanced over the super-stiff wire to level of the UPJ under direct fluoroscopic guidance. The inner stylet and super-stiff wire were removed and the sheath was sutured in place with 2-0 silk.  The kidney was entered with the flexible ureteroscope.  The kidney stone was identified and fragmented with a 200-micron holmium YAG laser fiber at laser settings of 0.2 joules and a frequency of 50 Hz. The fragments were basket extracted. At the completion of the procedure, all clinically significant fragments were removed and only dust-like fragments remained.  The access sheath was removed under direct vision.  Ureteral edema but no obvious obstruction was present.  A 5-Portuguese catheter was passed over the safety wire and the wire withdrawn.   Contrast was injected into the renal pelvis via the 5-Portuguese open-ended catheter.  A super-stiff wire was placed and confirmed by fluoroscopy.  A 6 Fr x 26 cm stent was positioned with the upper end in the kidney and the lower in the bladder confirmed by fluoroscopy. The bladder was emptied and the procedure was complete. The patient tolerated the procedure well and was stable throughout.    The patient will follow up with me next week for stent removal.

## 2019-07-09 NOTE — ANESTHESIA PREPROCEDURE EVALUATION
Anesthesia Evaluation     Patient summary reviewed and Nursing notes reviewed   history of anesthetic complications: PONV  NPO Solid Status: > 8 hours  NPO Liquid Status: > 8 hours           Airway   Mallampati: III  TM distance: >3 FB  Neck ROM: full  Large neck circumference and Possible difficult intubation  Dental - normal exam     Pulmonary - normal exam   (+) pneumonia resolved ,   Cardiovascular - normal exam    Rhythm: regular  Rate: normal    (+) hyperlipidemia,       Neuro/Psych  (+) headaches, psychiatric history Anxiety and Depression,     GI/Hepatic/Renal/Endo    (+) obesity, morbid obesity, GERD well controlled,      Musculoskeletal (-) negative ROS    Abdominal    Substance History - negative use     OB/GYN negative ob/gyn ROS         Other - negative ROS       ROS/Med Hx Other: Hx of PCOS-on Metformin                  Anesthesia Plan    ASA 3     general   (Risks and benefits discussed including risk of aspiration, recall and dental damage. All patient questions answered.    Patient told that either a breathing mask or a breathing tube will be used to manage the airway.    Will continue with plan of care.)  intravenous induction   Anesthetic plan, all risks, benefits, and alternatives have been provided, discussed and informed consent has been obtained with: patient.

## 2019-07-09 NOTE — DISCHARGE INSTRUCTIONS
Rest today  No pushing,pulling,tugging,heavy lifting, or strenuous activity   No major decision making,driving,or drinking alcoholic beverages for 24 hours due to the sedation you received  Always use good hand hygiene/washing technique  No driving on pain medication.    To assist you in voiding:  Drink plenty of fluids  Listen to running water while attempting to void.    If you are unable to urinate and you have an uncomfortable urge to void or it has been   6 hours since you were discharged, return to the Emergency Room.          Home Care After Ureteroscopy  The following instructions will help you care for yourself, or be cared for upon your return home today.    These are guidelines for your care right after surgery only.     Diet  Drink plenty of liquids and eat light meals today.    Start your regular diet tomorrow.    Activity  Start normal activities in twenty-four (24) hours.    Wound Care and Hygiene  No restrictions, start normal routine.    Anesthesia Precautions & Expectations  After anesthesia, rest for 24 hours.    Do not drive, drink alcoholic beverages or make any important decisions during this time.  General anesthesia may cause a sore throat, jaw discomfort or muscle aches.    These symptoms can last for one or two days.     What to Expect after Surgery  Mild pain with voiding.  Frequency or urgency.  Bladder cramps.  Minimal bleeding with voiding.    Call your Doctor  Passing clots in urine preventing bladder emptying  Severe pain not controlled by oral medication  Temperature above 101.5 degrees  Inability to urinate within eight (8) hours after surgery    After Stent Placement  It is common to have blood tinged urine for 3-5 days.  It is common to have pain in your side and in your back when you urinate for 3-5 days.  It is common to have urgency with urination.  This is a temporary stent and will need to be pulled at the next appointment.    Other Contacts  Urology Office:  [phone]   [place] [address]  [phone] office  [phone] fax    Follow up Appointment  You have a follow up scheduled with Dr. Valerio on Monday, 7/15/19 at 11 am      Follow Dr. Valerio's instructions as directed.

## 2019-07-15 ENCOUNTER — PROCEDURE VISIT (OUTPATIENT)
Dept: UROLOGY | Facility: CLINIC | Age: 29
End: 2019-07-15

## 2019-07-15 VITALS — WEIGHT: 235 LBS | HEIGHT: 64 IN | BODY MASS INDEX: 40.12 KG/M2 | RESPIRATION RATE: 18 BRPM

## 2019-07-15 DIAGNOSIS — N20.0 NEPHROLITHIASIS: Primary | ICD-10-CM

## 2019-07-15 LAB
CA PHOS CRY STONE QL IR: 5 %
COD CRY STONE QL IR: 3 %
COLOR STONE: NORMAL
COM CRY STONE QL IR: 92 %
COMPN STONE: NORMAL
Lab: NORMAL
Lab: NORMAL
NIDUS STONE QL: NORMAL
PATH REPORT.COMMENTS IMP SPEC: NORMAL
SIZE STONE: NORMAL MM
SURFACE CRYSTALS: NORMAL
WT STONE: 33.7 MG

## 2019-07-15 PROCEDURE — 52310 CYSTOSCOPY AND TREATMENT: CPT | Performed by: UROLOGY

## 2019-07-15 NOTE — PROGRESS NOTES
Preoperative diagnosis  Foreign body in genitourinary tract    Postoperative diagnosis  Foreign body in genitourinary tract    Procedure  Flexible cystourethroscopy with stent removal    Attending surgeon  Michael Valerio MD    Anesthesia  2% lidocaine jelly intraurethrally    Complications  None    Indications  28 y.o. female who is status post right ureteroscopy with holmium laser lithotripsy who presents for stent removal.    Procedure  Detailed information of all possible complications and side effects were discussed with the patient.  Informed consent was obtained. Patient was given one dose of antibiotics. The patient was placed in supine position and a timeout was performed. The patient was prepped and draped in sterile fashion.  Next, 2% lidocaine jelly was bluntly injected per urethra without difficulty. The 14 Swedish flexible cystoscope was passed through the urethra and into the bladder.  The stent was visualized, grasped and removed in its entirety.  The patient tolerated the procedure well.    Plan  1. Provided education regarding water intake of at least 2 liters per day  2. F/u in 8 weeks with a renal ultrasound  3. PTH and Uric Acid, Litholink

## 2019-09-04 DIAGNOSIS — J02.9 SORE THROAT: Primary | ICD-10-CM

## 2019-09-04 LAB — S PYO AG THROAT QL: NEGATIVE

## 2019-09-04 PROCEDURE — 87081 CULTURE SCREEN ONLY: CPT | Performed by: NURSE PRACTITIONER

## 2019-09-04 PROCEDURE — 87880 STREP A ASSAY W/OPTIC: CPT | Performed by: NURSE PRACTITIONER

## 2019-09-06 ENCOUNTER — HOSPITAL ENCOUNTER (OUTPATIENT)
Dept: ULTRASOUND IMAGING | Facility: HOSPITAL | Age: 29
Discharge: HOME | End: 2019-09-06
Admitting: UROLOGY

## 2019-09-06 DIAGNOSIS — N20.0 NEPHROLITHIASIS: ICD-10-CM

## 2019-09-06 LAB — BACTERIA SPEC AEROBE CULT: NORMAL

## 2019-09-06 PROCEDURE — 76775 US EXAM ABDO BACK WALL LIM: CPT

## 2019-09-09 ENCOUNTER — OFFICE VISIT (OUTPATIENT)
Dept: UROLOGY | Facility: CLINIC | Age: 29
End: 2019-09-09

## 2019-09-09 VITALS — TEMPERATURE: 97.4 F | RESPIRATION RATE: 18 BRPM | BODY MASS INDEX: 40.12 KG/M2 | WEIGHT: 235 LBS | HEIGHT: 64 IN

## 2019-09-09 DIAGNOSIS — N20.0 NEPHROLITHIASIS: Primary | ICD-10-CM

## 2019-09-09 PROCEDURE — 99213 OFFICE O/P EST LOW 20 MIN: CPT | Performed by: UROLOGY

## 2019-09-09 NOTE — PROGRESS NOTES
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     She. She presents today for follow up.  She denies any flank pain or gross hematuria.  She says she is trying to increase her water intake, decrease soda and fast food intake.    To review,  Stone related history:  Family history of kidney stones  yes  Renal disease or anatomic abnormality: no  Malabsorptive disease or gastric bypass: no  Frequent UTI's    no  Parathyroid disease    no    Dietary Considerations  Soda - 1 per day  Fast food - 0-1 per week  Water - 64 oz per day  Doesn't add salt    Past Medical History  Past Medical History:   Diagnosis Date   • Anxiety and depression    • BMI 40.0-44.9, adult (CMS/[facility])    • Body piercing     ears   • Elevated cholesterol     Reported LDL slightly elevated but has never been Rx medication   • GERD (gastroesophageal reflux disease)    • Headache    • History of pneumonia    • Nephrolithiasis 06/21/2019    Patient reported ER visits but that she has not require surgical intervention in the past   • PCOS (polycystic ovarian syndrome)    • PONV (postoperative nausea and vomiting)        Past Surgical History  Past Surgical History:   Procedure Laterality Date   • CYSTOSCOPY      Done in the office setting, no anesthesia   • LAPAROSCOPIC CHOLECYSTECTOMY     • URETEROSCOPY LASER LITHOTRIPSY WITH STENT INSERTION Right 7/9/2019    Procedure: CYSTOSCOPY, URETEROSCOPY LASER LITHOTRIPSY WITH BASKET EXTRACTION OF STONE FRAGMENTS, RETROGRADE PYELOGRAM AND STENT INSERTION;  Surgeon: Michael Valerio MD;  Location: [facility];  Service: Urology   • WISDOM TOOTH EXTRACTION         Medications    Current Outpatient Medications:   •  albuterol (PROVENTIL HFA;VENTOLIN HFA) 108 (90 Base) MCG/ACT inhaler, Inhale 2 puffs Every 4 (Four) Hours As Needed for Wheezing., Disp: 18 g, Rfl: 2  •  cholecalciferol (VITAMIN D3) 1000 units tablet, Take 1,000 Units by  mouth Daily., Disp: , Rfl:   •  ciprofloxacin (CIPRO) 500 MG tablet, Take 1 tablet by mouth 2 (Two) Times a Day., Disp: 6 tablet, Rfl: 0  •  docusate sodium (COLACE) 100 MG capsule, Take 1 capsule by mouth 2 (Two) Times a Day. If taking percocet, Disp: 15 capsule, Rfl: 1  •  lansoprazole (PREVACID) 30 MG capsule, Take 1 capsule by mouth Daily., Disp: 30 capsule, Rfl: 5  •  medroxyPROGESTERone (PROVERA) 10 MG tablet, Take 1 tablet by mouth Daily x10 days each month as instructed, Disp: 10 tablet, Rfl: 12  •  metFORMIN (GLUCOPHAGE) 1000 MG tablet, Take 1 tablet by mouth Daily With Breakfast., Disp: 30 tablet, Rfl: 5  •  mupirocin (BACTROBAN) 2 % ointment, Apply topically to the appropriate area as directed 3 (Three) Times a Day. (Patient taking differently: Apply 1 application topically to the appropriate area as directed Daily As Needed (skin irritation).), Disp: 30 g, Rfl: 2  •  nystatin (MYCOSTATIN) 439863 UNIT/GM powder, Apply  topically 3 (Three) Times a Day As Needed (itching/irritation). (Patient taking differently: Apply 1 application topically to the appropriate area as directed 3 (Three) Times a Day As Needed (itching/irritation).), Disp: 30 g, Rfl: 0  •  nystatin-triamcinolone (MYCOLOG) 366530-1.1 UNIT/GM-% ointment, Apply  topically to the appropriate area as directed 2 (Two) Times a Day. (Patient taking differently: Apply 1 application topically to the appropriate area as directed 2 (Two) Times a Day.), Disp: 60 g, Rfl: 2  •  ondansetron (ZOFRAN) 4 MG tablet, Take 1 tablet by mouth Every 6 Hours As Needed for Nausea or Vomiting., Disp: 20 tablet, Rfl: 5  •  oxybutynin XL (DITROPAN XL) 10 MG 24 hr tablet, Take 1 tablet by mouth Daily As Needed for bladder spasm, Disp: 10 tablet, Rfl: 0  •  oxyCODONE (ROXICODONE) 5 MG immediate release tablet, Take 1 tablet by mouth Every 6 (Six) Hours As Needed for Moderate Pain  or Severe Pain ., Disp: 10 tablet, Rfl: 0  •  phentermine 15 MG capsule, Take 1 capsule by  mouth 2 (Two) Times a Day., Disp: 60 capsule, Rfl: 2  •  sertraline (ZOLOFT) 100 MG tablet, Take 1 tablet by mouth Daily., Disp: 30 tablet, Rfl: 5  •  spironolactone (ALDACTONE) 50 MG tablet, Take one tablet by mouth daily (Patient taking differently: Take 50 mg by mouth Daily. Take one tablet by mouth daily), Disp: 30 tablet, Rfl: 5  •  tamsulosin (FLOMAX) 0.4 MG capsule 24 hr capsule, Take 1 capsule by mouth Every Night., Disp: 10 capsule, Rfl: 0    Allergies  No Known Allergies    Social History  Social History     Socioeconomic History   • Marital status: Single     Spouse name: Not on file   • Number of children: Not on file   • Years of education: Not on file   • Highest education level: Associate degree: academic program   Tobacco Use   • Smoking status: Former Smoker     Packs/day: 1.00     Years: 6.00     Pack years: 6.00     Types: Cigarettes     Start date: 2/15/2008     Last attempt to quit: 2/15/2014     Years since quittin.5   • Smokeless tobacco: Never Used   Substance and Sexual Activity   • Alcohol use: Yes     Comment: Occasional, no history of abuse   • Drug use: No   • Sexual activity: Yes     Partners: Male     Birth control/protection: OCP       Family History  Family History   Problem Relation Age of Onset   • Thyroid disease Mother    • Hypertension Maternal Grandmother    • Stroke Maternal Grandmother    • Lymphoma Paternal Grandfather 65        NH Lymphoma   • Breast cancer Other    • Stomach cancer Other    • Heart valve disorder Father    • No Known Problems Brother    • Bone cancer Paternal Aunt 50        osteosarcoma?   • Hyperlipidemia Maternal Grandfather    • No Known Problems Paternal Grandmother        Review of Systems  Constitutional: No fevers or chills  Skin: Negative for rash  Endocrine: No heat/cold intolerance   Cardiovascular: Negative for chest pain or dyspnea on exertion  Respiratory: Negative for shortness of breath or wheezing  Gastrointestinal: No constipation,  "nausea or vomiting  Genitourinary: No gross hematuria today  Musculoskeletal: Negative for low back pain  Neurological:  Negative for frequent headaches or dizziness  Lymph/Heme: Negative for leg swelling or calf pain.    All other systems reviewed and negative    Physical Exam  Visit Vitals  Temp 97.4 °F (36.3 °C)   Resp 18   Ht 162.6 cm (64.02\")   Wt 107 kg (235 lb)   BMI 40.32 kg/m²     Constitutional: NAD, WDWN.   HEENT: NCAT. Conjunctivae normal.  MMM.  Endocrine: no clear thyromegaly    Cardiovascular: Regular rate.  Pulmonary/Chest: Respirations are even and non-labored bilaterally.  Back:  no CVA tenderness.  Neurological: A + O x 3.  Cranial Nerves II-XII grossly intact.  Extremities: ILIANA x 4, Warm. No clubbing.  No cyanosis.    Skin: Pink, warm and dry.  No rashes noted.    Labs  Lab Results   Component Value Date    GLUCOSE 76 06/25/2019    BUN 11 06/25/2019    CREATININE 0.73 06/25/2019    EGFRIFNONA 95 06/25/2019    BCR 15.1 06/25/2019    K 4.7 06/25/2019    CO2 26.5 06/25/2019    CALCIUM 9.4 06/25/2019    ALBUMIN 4.30 09/06/2017    AST 18 09/06/2017    ALT 27 09/06/2017       Lab Results   Component Value Date    WBC 12.88 (H) 06/21/2019    HGB 13.7 06/21/2019    HCT 41.6 06/21/2019    MCV 85.2 06/21/2019     06/21/2019       No results found for: LDH, URICACID    Lab Results   Component Value Date    CALCIUM 9.4 06/25/2019       Brief Urine Lab Results  (Last result in the past 365 days)      Color   Clarity   Blood   Leuk Est   Nitrite   Protein   CREAT   Urine HCG        07/09/19 0837               Negative         Lab Results   Component Value Date    HGBA1C 5.20 09/06/2017        )No components found for: STONEANALYSI      Radiologic Studies  Us Renal Bilateral    Result Date: 9/7/2019  Impression: Normal renal ultrasound.  This report was finalized on 9/7/2019 11:04 AM by Jacky Bautista DO.      I have personally reviewed these labs and images.  Her right renal stone may be 3 mm and axial " dimension, but it is almost 10 mm in coronal dimension.      Assessment  Ms. Rivas is a 28 y.o. female with Hx of stones, gross hematuria and flank pain, who has moderate volume right nephrolithiasis.  She had a 1 cm and 3 mm stone on the right side.  She is status post  right ureteroscopy, holmium laser lithotripsy, stent 7/1/19.  Her stone was maturely calcium oxalate monohydrate.  She did not get her Litholink performed.      Plan  1.  Follow-up as needed

## 2019-11-01 DIAGNOSIS — N64.4 BREAST PAIN, LEFT: Primary | ICD-10-CM

## 2019-11-01 DIAGNOSIS — N63.24 BREAST LUMP ON LEFT SIDE AT 8 O'CLOCK POSITION: ICD-10-CM

## 2019-11-04 ENCOUNTER — HOSPITAL ENCOUNTER (OUTPATIENT)
Dept: ULTRASOUND IMAGING | Facility: HOSPITAL | Age: 29
Discharge: HOME | End: 2019-11-04
Admitting: NURSE PRACTITIONER

## 2019-11-04 DIAGNOSIS — N63.24 BREAST LUMP ON LEFT SIDE AT 8 O'CLOCK POSITION: ICD-10-CM

## 2019-11-04 DIAGNOSIS — N64.4 BREAST PAIN, LEFT: ICD-10-CM

## 2019-11-04 PROCEDURE — 76642 ULTRASOUND BREAST LIMITED: CPT | Performed by: RADIOLOGY

## 2019-11-04 PROCEDURE — 76642 ULTRASOUND BREAST LIMITED: CPT

## 2019-11-11 RX ORDER — NYSTATIN 100000 [USP'U]/G
POWDER TOPICAL 3 TIMES DAILY PRN
Qty: 30 G | Refills: 6 | Status: SHIPPED | OUTPATIENT
Start: 2019-11-11 | End: 2021-03-16

## 2020-01-03 RX ORDER — SERTRALINE HYDROCHLORIDE 100 MG/1
100 TABLET, FILM COATED ORAL DAILY
Qty: 30 TABLET | Refills: 5 | Status: SHIPPED | OUTPATIENT
Start: 2020-01-03 | End: 2020-06-10 | Stop reason: ALTCHOICE

## 2020-01-03 RX ORDER — FLUCONAZOLE 150 MG/1
150 TABLET ORAL
Qty: 2 TABLET | Refills: 3 | Status: SHIPPED | OUTPATIENT
Start: 2020-01-03 | End: 2020-01-09

## 2020-01-10 RX ORDER — VALACYCLOVIR HYDROCHLORIDE 500 MG/1
500 TABLET, FILM COATED ORAL 2 TIMES DAILY
Qty: 6 TABLET | Refills: 5 | Status: SHIPPED | OUTPATIENT
Start: 2020-01-10 | End: 2020-01-13

## 2020-01-27 ENCOUNTER — LAB (OUTPATIENT)
Dept: LAB | Facility: HOSPITAL | Age: 30
End: 2020-01-27

## 2020-01-27 DIAGNOSIS — R30.0 DYSURIA: ICD-10-CM

## 2020-01-27 DIAGNOSIS — E28.2 PCOS (POLYCYSTIC OVARIAN SYNDROME): ICD-10-CM

## 2020-01-27 DIAGNOSIS — R30.0 DYSURIA: Primary | ICD-10-CM

## 2020-01-27 LAB
BACTERIA UR QL AUTO: ABNORMAL /HPF
BILIRUB UR QL STRIP: NEGATIVE
CLARITY UR: CLEAR
COLOR UR: YELLOW
GLUCOSE UR STRIP-MCNC: NEGATIVE MG/DL
HBA1C MFR BLD: 5.8 % (ref 4.8–5.6)
HGB UR QL STRIP.AUTO: ABNORMAL
HYALINE CASTS UR QL AUTO: ABNORMAL /LPF
KETONES UR QL STRIP: NEGATIVE
LEUKOCYTE ESTERASE UR QL STRIP.AUTO: NEGATIVE
NITRITE UR QL STRIP: NEGATIVE
PH UR STRIP.AUTO: 5.5 [PH] (ref 5–8)
PROT UR QL STRIP: ABNORMAL
RBC # UR: ABNORMAL /HPF
REF LAB TEST METHOD: ABNORMAL
SP GR UR STRIP: 1.02 (ref 1–1.03)
SQUAMOUS #/AREA URNS HPF: ABNORMAL /HPF
UROBILINOGEN UR QL STRIP: ABNORMAL
WBC UR QL AUTO: ABNORMAL /HPF

## 2020-01-27 PROCEDURE — 83036 HEMOGLOBIN GLYCOSYLATED A1C: CPT

## 2020-01-27 PROCEDURE — 81001 URINALYSIS AUTO W/SCOPE: CPT | Performed by: NURSE PRACTITIONER

## 2020-01-27 PROCEDURE — 36415 COLL VENOUS BLD VENIPUNCTURE: CPT

## 2020-03-09 ENCOUNTER — OFFICE VISIT (OUTPATIENT)
Dept: GYNECOLOGIC ONCOLOGY | Facility: CLINIC | Age: 30
End: 2020-03-09

## 2020-03-09 ENCOUNTER — LAB (OUTPATIENT)
Dept: LAB | Facility: HOSPITAL | Age: 30
End: 2020-03-09

## 2020-03-09 VITALS
HEIGHT: 64 IN | OXYGEN SATURATION: 97 % | WEIGHT: 256 LBS | TEMPERATURE: 97.7 F | HEART RATE: 74 BPM | BODY MASS INDEX: 43.71 KG/M2 | RESPIRATION RATE: 10 BRPM

## 2020-03-09 DIAGNOSIS — Z01.419 WELL WOMAN EXAM WITH ROUTINE GYNECOLOGICAL EXAM: Primary | ICD-10-CM

## 2020-03-09 DIAGNOSIS — N91.2 AMENORRHEA: ICD-10-CM

## 2020-03-09 DIAGNOSIS — E28.2 PCOS (POLYCYSTIC OVARIAN SYNDROME): ICD-10-CM

## 2020-03-09 DIAGNOSIS — Z01.419 WELL WOMAN EXAM WITH ROUTINE GYNECOLOGICAL EXAM: ICD-10-CM

## 2020-03-09 PROCEDURE — 99395 PREV VISIT EST AGE 18-39: CPT | Performed by: NURSE PRACTITIONER

## 2020-03-09 NOTE — PROGRESS NOTES
GYN ONCOLOGY ANNUAL WELL WOMAN VISIT      Taylor Rivas  [id number]  1990      Chief Complaint: Annual Exam (c/o amenorrhea)        History of present illness:  Taylor Rivas is a 29 y.o. year old female who is here today for an annual exam. She has known PCOS, previously managed with OCPs, spironolactone, and Metformin. She has resumed taking Metformin as of last week and has not taken OCPs since 2017 as she is desiring pregnancy. She has not had a menstrual period for a few months because she has not been taking progesterone. The higher dose cyclic progesterone causes hot flashes, so she has not taken this for several months. She still has cystic acne with trouble losing weight, and recent weight gain.   Otherwise, she reports she is feeling well today. She denies vaginal bleeding, pelvic pain, changes in bowel or bladder function, new or concerning lesions, and breast problems. She is very happy with her spironolactone for management of acne and Zoloft for anxiety/depression.         Obstetric History:  OB History        0    Para   0    Term   0       0    AB   0    Living   0       SAB   0    TAB   0    Ectopic   0    Molar        Multiple   0    Live Births                   Menstrual History:     Patient's last menstrual period was 2019 (approximate).          Past Medical History:   Diagnosis Date   • Anxiety and depression    • BMI 40.0-44.9, adult (CMS/[facility])    • Body piercing     ears   • Elevated cholesterol     Reported LDL slightly elevated but has never been Rx medication   • GERD (gastroesophageal reflux disease)    • Headache    • History of pneumonia    • Nephrolithiasis 2019    Patient reported ER visits but that she has not require surgical intervention in the past   • PCOS (polycystic ovarian syndrome)    • PONV (postoperative nausea and vomiting)        Past Surgical History:   Procedure Laterality Date   • CYSTOSCOPY      Done in the office setting, no  anesthesia   • LAPAROSCOPIC CHOLECYSTECTOMY     • URETEROSCOPY LASER LITHOTRIPSY WITH STENT INSERTION Right 7/9/2019    Procedure: CYSTOSCOPY, URETEROSCOPY LASER LITHOTRIPSY WITH BASKET EXTRACTION OF STONE FRAGMENTS, RETROGRADE PYELOGRAM AND STENT INSERTION;  Surgeon: Michael Valerio MD;  Location: [facility];  Service: Urology   • WISDOM TOOTH EXTRACTION         MEDICATIONS: The current medication list was reviewed and reconciled.     Allergies:  has No Known Allergies.    Family History   Problem Relation Age of Onset   • Thyroid disease Mother    • Hypertension Maternal Grandmother    • Stroke Maternal Grandmother    • Lymphoma Paternal Grandfather 65        [facility] Lymphoma   • Breast cancer Other    • Stomach cancer Other    • Heart valve disorder Father    • No Known Problems Brother    • Bone cancer Paternal Aunt 50        osteosarcoma?   • Hyperlipidemia Maternal Grandfather    • No Known Problems Paternal Grandmother    • Ovarian cancer Neg Hx        Health Maintenance:  Last pap smear was 03/2019, results were  normal PAP.. She  does not have a history of abnormal pap smears.      Review of Systems   Constitutional: Negative for fatigue, fever and unexpected weight change.   HENT: Negative for congestion, ear pain, hearing loss, sinus pressure and trouble swallowing.    Eyes: Negative for visual disturbance.   Respiratory: Negative for cough, chest tightness, shortness of breath and wheezing.    Cardiovascular: Negative for chest pain, palpitations and leg swelling.   Gastrointestinal: Negative for abdominal distention, abdominal pain, constipation, diarrhea, nausea and vomiting.   Endocrine: Negative for cold intolerance, heat intolerance, polydipsia, polyphagia and polyuria.   Genitourinary: Negative for difficulty urinating, dyspareunia, dysuria, frequency, hematuria, pelvic pain, urgency, vaginal bleeding, vaginal discharge and vaginal pain.   Musculoskeletal: Negative for arthralgias, gait problem,  "joint swelling and myalgias.   Skin: Negative for color change, pallor and rash.   Neurological: Negative for dizziness, seizures, syncope, weakness, light-headedness, numbness and headaches.   Hematological: Negative for adenopathy. Does not bruise/bleed easily.   Psychiatric/Behavioral: Negative for agitation, confusion, sleep disturbance and suicidal ideas. The patient is not nervous/anxious.          Physical Exam  Vital Signs: Pulse 74   Temp 97.7 °F (36.5 °C) (Temporal)   Resp 10   Ht 162.6 cm (64\")   Wt 116 kg (256 lb)   LMP 09/16/2019 (Approximate)   SpO2 97%   BMI 43.94 kg/m²   Vitals:    03/09/20 0825   PainSc: 0-No pain        Wt Readings from Last 10 Encounters:   03/09/20 116 kg (256 lb)   09/09/19 107 kg (235 lb)   07/15/19 107 kg (235 lb)   06/21/19 107 kg (235 lb 6 oz)   06/21/19 109 kg (241 lb)   05/23/19 109 kg (241 lb)   03/08/19 109 kg (241 lb)   03/02/18 110 kg (243 lb 9.6 oz)   03/02/18 110 kg (243 lb)   08/18/17 111 kg (244 lb)           General Appearance:  alert, cooperative, no apparent distress, appears stated age and obese   Neurologic/Psychiatric: A&O x 3, gait steady, appropriate affect   HEENT:  Normocephalic, without obvious abnormality, mucous membranes moist   Neck: Supple, symmetrical, trachea midline, no adenopathy;  No thyromegaly, masses, or tenderness   Back:   Symmetric, no curvature, ROM normal, no CVA tenderness   Lungs:   Clear to auscultation bilaterally; respirations regular, even, and unlabored bilaterally   Heart:  Regular rate and rhythm, no murmurs appreciated   Breasts:  Symmetrical, no masses, no lesions and no nipple discharge   Abdomen:   Soft, non-tender, non-distended, no organomegaly and Exam limited d/t habitus.   Lymph nodes: No cervical, supraclavicular, inguinal or axillary adenopathy noted   Extremities: Normal, atraumatic; no clubbing, cyanosis, or edema    Skin: No rashes, ulcers, or suspicious lesions noted   Pelvic: External Genitalia  without " lesions or skin changes  Vagina  is pink, moist, without lesions.   Cervix  normal, without lesions, no discharge, no CMT and pap smear obtained  Uterus  normal size, midposition, mobile and nontender  Ovaries  without palpable masses or fullness  Parametria  smooth  Rectovaginal  Female rectovaginal: deferred         Procedure Note:  No notes on file    Assessment and Plan:    Taylor was seen today for annual exam.    Diagnoses and all orders for this visit:    Well woman exam with routine gynecological exam  -     TSH; Future  -     Vitamin D 25 Hydroxy; Future  -     Vitamin B12; Future  -     Pap IG, Rfx HPV ASCU; Future    PCOS (polycystic ovarian syndrome)    Amenorrhea        Pap was done today. If she does not receive the results of the Pap within 2 weeks  time, she was instructed to call to find out the results.      She was recommended to begin mammograms at age 40 for breast cancer screening, colonoscopy at age 50 for colon cancer screening and bone density scans (DEXA) at age 60-65 for osteoporosis screening.    Pain assessment was performed today as a part of patient’s care. For patients with pain related to surgery, gynecologic malignancy or cancer treatment, the plan is as noted in the assessment/plan.  For patients with pain not related to these issues, they are to seek any further needed care from a more appropriate provider, such as PCP.    Patient and I discussed her amenorrhea, PCOS, and medication regimen. She has been back on metformin x 1 week at 500 mg PO daily. She is to continue this for an additional week, then increase to 1000 mg PO daily.   We discussed the benefit of continue progesterone to help have regular withdrawal bleeding. Reviewed risks of amenorrhea. Plan made for cyclic progesterone as prescribed now. If successful withdrawal bleed, then will switch to lower dose daily mini-pill. We reviewed medication instructions, risks, benefits, and potential side effects.       Return  to clinic in 1 year for Annual exam.      Gwen Leahy, APRN

## 2020-03-10 DIAGNOSIS — Z01.419 WELL WOMAN EXAM WITH ROUTINE GYNECOLOGICAL EXAM: Primary | ICD-10-CM

## 2020-03-12 RX ORDER — ACETAMINOPHEN AND CODEINE PHOSPHATE 120; 12 MG/5ML; MG/5ML
1 SOLUTION ORAL DAILY
Qty: 28 TABLET | Refills: 12 | Status: SHIPPED | OUTPATIENT
Start: 2020-03-12 | End: 2021-01-07

## 2020-03-19 RX ORDER — SULFAMETHOXAZOLE AND TRIMETHOPRIM 800; 160 MG/1; MG/1
1 TABLET ORAL 2 TIMES DAILY
Qty: 20 TABLET | Refills: 0 | Status: SHIPPED | OUTPATIENT
Start: 2020-03-19 | End: 2020-06-10

## 2020-04-02 ENCOUNTER — LAB (OUTPATIENT)
Dept: LAB | Facility: HOSPITAL | Age: 30
End: 2020-04-02

## 2020-04-02 DIAGNOSIS — Z01.419 WELL WOMAN EXAM WITH ROUTINE GYNECOLOGICAL EXAM: ICD-10-CM

## 2020-04-02 LAB
25(OH)D3 SERPL-MCNC: 14.2 NG/ML (ref 30–100)
TSH SERPL DL<=0.05 MIU/L-ACNC: 2.58 UIU/ML (ref 0.27–4.2)
VIT B12 BLD-MCNC: 449 PG/ML (ref 211–946)

## 2020-04-02 PROCEDURE — 82306 VITAMIN D 25 HYDROXY: CPT

## 2020-04-02 PROCEDURE — 82607 VITAMIN B-12: CPT

## 2020-04-02 PROCEDURE — 36415 COLL VENOUS BLD VENIPUNCTURE: CPT

## 2020-04-02 PROCEDURE — 84443 ASSAY THYROID STIM HORMONE: CPT

## 2020-04-03 RX ORDER — ERGOCALCIFEROL 1.25 MG/1
50000 CAPSULE ORAL WEEKLY
Qty: 4 CAPSULE | Refills: 3 | Status: SHIPPED | OUTPATIENT
Start: 2020-04-03 | End: 2021-03-16

## 2020-04-07 DIAGNOSIS — R79.89 ELEVATED TESTOSTERONE LEVEL IN FEMALE: ICD-10-CM

## 2020-04-07 DIAGNOSIS — E28.2 PCOS (POLYCYSTIC OVARIAN SYNDROME): ICD-10-CM

## 2020-04-07 RX ORDER — ONDANSETRON 4 MG/1
4 TABLET, FILM COATED ORAL EVERY 6 HOURS PRN
Qty: 20 TABLET | Refills: 5 | Status: SHIPPED | OUTPATIENT
Start: 2020-04-07 | End: 2021-07-23 | Stop reason: SDUPTHER

## 2020-04-07 RX ORDER — MEDROXYPROGESTERONE ACETATE 10 MG/1
10 TABLET ORAL DAILY
Qty: 10 TABLET | Refills: 12 | Status: SHIPPED | OUTPATIENT
Start: 2020-04-07 | End: 2020-06-10 | Stop reason: ALTCHOICE

## 2020-04-07 RX ORDER — METFORMIN HYDROCHLORIDE 500 MG/1
1000 TABLET, EXTENDED RELEASE ORAL
Qty: 60 TABLET | Refills: 3 | Status: SHIPPED | OUTPATIENT
Start: 2020-04-07 | End: 2020-07-06 | Stop reason: ALTCHOICE

## 2020-04-07 RX ORDER — SPIRONOLACTONE 50 MG/1
TABLET, FILM COATED ORAL
Qty: 30 TABLET | Refills: 5 | Status: SHIPPED | OUTPATIENT
Start: 2020-04-07 | End: 2021-01-07

## 2020-04-08 DIAGNOSIS — F32.A ANXIETY AND DEPRESSION: Primary | ICD-10-CM

## 2020-04-08 DIAGNOSIS — F41.9 ANXIETY AND DEPRESSION: Primary | ICD-10-CM

## 2020-04-08 RX ORDER — CLONAZEPAM 0.5 MG/1
0.5 TABLET ORAL NIGHTLY PRN
Qty: 30 TABLET | Refills: 0 | Status: SHIPPED | OUTPATIENT
Start: 2020-04-08 | End: 2020-06-10 | Stop reason: SDUPTHER

## 2020-06-04 DIAGNOSIS — R11.0 NAUSEA: Primary | ICD-10-CM

## 2020-06-04 DIAGNOSIS — K59.1 FUNCTIONAL DIARRHEA: ICD-10-CM

## 2020-06-05 ENCOUNTER — LAB (OUTPATIENT)
Dept: LAB | Facility: HOSPITAL | Age: 30
End: 2020-06-05

## 2020-06-05 DIAGNOSIS — R11.0 NAUSEA: ICD-10-CM

## 2020-06-05 DIAGNOSIS — K59.1 FUNCTIONAL DIARRHEA: ICD-10-CM

## 2020-06-05 LAB
ALBUMIN SERPL-MCNC: 4.1 G/DL (ref 3.5–5.2)
ALBUMIN/GLOB SERPL: 1 G/DL
ALP SERPL-CCNC: 68 U/L (ref 39–117)
ALT SERPL W P-5'-P-CCNC: 24 U/L (ref 1–33)
ANION GAP SERPL CALCULATED.3IONS-SCNC: 13 MMOL/L (ref 5–15)
AST SERPL-CCNC: 21 U/L (ref 1–32)
BILIRUB SERPL-MCNC: 0.3 MG/DL (ref 0.2–1.2)
BUN BLD-MCNC: 9 MG/DL (ref 6–20)
BUN/CREAT SERPL: 14.5 (ref 7–25)
CALCIUM SPEC-SCNC: 9.2 MG/DL (ref 8.6–10.5)
CHLORIDE SERPL-SCNC: 102 MMOL/L (ref 98–107)
CO2 SERPL-SCNC: 25 MMOL/L (ref 22–29)
CREAT BLD-MCNC: 0.62 MG/DL (ref 0.57–1)
ERYTHROCYTE [DISTWIDTH] IN BLOOD BY AUTOMATED COUNT: 13.4 % (ref 12.3–15.4)
GFR SERPL CREATININE-BSD FRML MDRD: 114 ML/MIN/1.73
GLOBULIN UR ELPH-MCNC: 4.1 GM/DL
GLUCOSE BLD-MCNC: 101 MG/DL (ref 65–99)
HCT VFR BLD AUTO: 42.3 % (ref 34–46.6)
HGB BLD-MCNC: 13.2 G/DL (ref 12–15.9)
LYMPHOCYTES # BLD AUTO: 3.4 10*3/MM3 (ref 0.7–3.1)
LYMPHOCYTES NFR BLD AUTO: 35.6 % (ref 19.6–45.3)
MCH RBC QN AUTO: 26.9 PG (ref 26.6–33)
MCHC RBC AUTO-ENTMCNC: 31.3 G/DL (ref 31.5–35.7)
MCV RBC AUTO: 86 FL (ref 79–97)
MONOCYTES # BLD AUTO: 0.4 10*3/MM3 (ref 0.1–0.9)
MONOCYTES NFR BLD AUTO: 4.5 % (ref 5–12)
NEUTROPHILS # BLD AUTO: 5.8 10*3/MM3 (ref 1.7–7)
NEUTROPHILS NFR BLD AUTO: 59.9 % (ref 42.7–76)
PLATELET # BLD AUTO: 428 10*3/MM3 (ref 140–450)
PMV BLD AUTO: 7.2 FL (ref 6–12)
POTASSIUM BLD-SCNC: 4.2 MMOL/L (ref 3.5–5.2)
PROT SERPL-MCNC: 8.2 G/DL (ref 6–8.5)
RBC # BLD AUTO: 4.91 10*6/MM3 (ref 3.77–5.28)
SODIUM BLD-SCNC: 140 MMOL/L (ref 136–145)
WBC NRBC COR # BLD: 9.6 10*3/MM3 (ref 3.4–10.8)

## 2020-06-05 PROCEDURE — 84260 ASSAY OF SEROTONIN: CPT

## 2020-06-05 PROCEDURE — 36415 COLL VENOUS BLD VENIPUNCTURE: CPT

## 2020-06-05 PROCEDURE — 85025 COMPLETE CBC W/AUTO DIFF WBC: CPT

## 2020-06-05 PROCEDURE — 80053 COMPREHEN METABOLIC PANEL: CPT

## 2020-06-10 DIAGNOSIS — F32.A ANXIETY AND DEPRESSION: ICD-10-CM

## 2020-06-10 DIAGNOSIS — F41.9 ANXIETY AND DEPRESSION: ICD-10-CM

## 2020-06-10 RX ORDER — FLUOXETINE HYDROCHLORIDE 20 MG/1
20 CAPSULE ORAL DAILY
Qty: 30 CAPSULE | Refills: 5 | Status: SHIPPED | OUTPATIENT
Start: 2020-06-10 | End: 2020-07-06

## 2020-06-10 RX ORDER — CLONAZEPAM 0.5 MG/1
0.5 TABLET ORAL NIGHTLY PRN
Qty: 30 TABLET | Refills: 2 | Status: SHIPPED | OUTPATIENT
Start: 2020-06-10 | End: 2020-10-29

## 2020-06-11 LAB — SEROTONIN PLAS-MCNC: 15 NG/ML (ref 0–420)

## 2020-07-06 RX ORDER — FLUOXETINE HYDROCHLORIDE 40 MG/1
40 CAPSULE ORAL DAILY
Qty: 30 CAPSULE | Refills: 5 | Status: SHIPPED | OUTPATIENT
Start: 2020-07-06 | End: 2021-01-25 | Stop reason: ALTCHOICE

## 2020-08-03 DIAGNOSIS — E28.2 PCOS (POLYCYSTIC OVARIAN SYNDROME): Primary | ICD-10-CM

## 2020-08-04 ENCOUNTER — DOCUMENTATION (OUTPATIENT)
Dept: GYNECOLOGIC ONCOLOGY | Facility: CLINIC | Age: 30
End: 2020-08-04

## 2020-08-04 RX ORDER — LETROZOLE 2.5 MG/1
2.5 TABLET, FILM COATED ORAL DAILY
Qty: 5 TABLET | Refills: 5 | Status: SHIPPED | OUTPATIENT
Start: 2020-08-04 | End: 2021-03-16

## 2020-08-04 RX ORDER — MEDROXYPROGESTERONE ACETATE 10 MG/1
10 TABLET ORAL DAILY
Qty: 10 TABLET | Refills: 0 | Status: SHIPPED | OUTPATIENT
Start: 2020-08-04 | End: 2020-09-11

## 2020-09-02 DIAGNOSIS — N91.2 AMENORRHEA: Primary | ICD-10-CM

## 2020-09-11 ENCOUNTER — LAB (OUTPATIENT)
Dept: LAB | Facility: HOSPITAL | Age: 30
End: 2020-09-11

## 2020-09-11 DIAGNOSIS — N91.2 AMENORRHEA: ICD-10-CM

## 2020-09-11 LAB — HCG INTACT+B SERPL-ACNC: <0.5 MIU/ML

## 2020-09-11 PROCEDURE — 84702 CHORIONIC GONADOTROPIN TEST: CPT

## 2020-09-11 PROCEDURE — 36415 COLL VENOUS BLD VENIPUNCTURE: CPT

## 2020-09-11 RX ORDER — MEDROXYPROGESTERONE ACETATE 10 MG/1
10 TABLET ORAL DAILY
Qty: 10 TABLET | Refills: 5 | Status: SHIPPED | OUTPATIENT
Start: 2020-09-11 | End: 2022-05-24

## 2020-10-22 DIAGNOSIS — F32.A ANXIETY AND DEPRESSION: Primary | ICD-10-CM

## 2020-10-22 DIAGNOSIS — F41.9 ANXIETY AND DEPRESSION: Primary | ICD-10-CM

## 2020-10-23 RX ORDER — IBUPROFEN 600 MG/1
600 TABLET ORAL EVERY 6 HOURS PRN
Qty: 30 TABLET | Refills: 3 | Status: SHIPPED | OUTPATIENT
Start: 2020-10-23 | End: 2022-08-03

## 2020-10-29 ENCOUNTER — OFFICE VISIT (OUTPATIENT)
Dept: PSYCHIATRY | Facility: CLINIC | Age: 30
End: 2020-10-29

## 2020-10-29 DIAGNOSIS — F41.1 GENERALIZED ANXIETY DISORDER: Primary | ICD-10-CM

## 2020-10-29 PROCEDURE — 90792 PSYCH DIAG EVAL W/MED SRVCS: CPT | Performed by: NURSE PRACTITIONER

## 2020-10-29 RX ORDER — BUSPIRONE HYDROCHLORIDE 5 MG/1
5 TABLET ORAL 2 TIMES DAILY
Qty: 60 TABLET | Refills: 0 | Status: SHIPPED | OUTPATIENT
Start: 2020-10-29 | End: 2020-11-19 | Stop reason: SDUPTHER

## 2020-10-29 NOTE — PROGRESS NOTES
"    Subjective   Taylor Rivas is a  29 y.o. female who is here in office face to face today for initial appointment. Covid precautions used. Patient was referred by: Gwen WING, GYN/ONC for anxiety symptoms. 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 Her  works full time. Patient has PCOS and will be working with a OB/GYN on fertility and wanting to get pregnant.     Chief Complaint:  Anxiety, h/o panic attack and social anxiety     History of Present Illness The patient reports the following symptoms of generalized anxiety: anxiety/worry escalated at work, restlessness/on edge, internal tremor, chest and face flushing,  difficulty concentrating, mind goes blank, irritability, muscle tension and sleep disturbance. The symptoms have been present for at least 10 years(s) and have caused impairment in important areas of functioning. She has been treated successfully with sertraline 100mg without symptoms however she gained 20 lbs on it without changing eating pattern. Patient was switched to fluoxetine 40mg daily and lost 20 lbs but anxiety spiked as recorded. She and her  want to get pregnant and she is working on hormone levels. Patient reports does well at home, \"its so peaceful and my  is kind and caring to me\". She states at work she has significant increase with above symptoms. She was going back to school for classes towards getting an RN but became very stressful with Covid, work and got . She plans on returning in January which will increase anxiety. She has difficulty falling asleep often because of racing thoughts and heart will race and body flush. Denies depression, denies OCD, PTSD, or manic like symptoms. Denies illicit drug use including cannabis, alcohol abuse , only on occasion and denies tobacco use.     The following portions of the patient's history were reviewed and updated as " appropriate: allergies, current medications, past family history, past medical history, past social history, past surgical history and problem list.      Past Psych History: JON, social anxiety, and premenstrual dysphoric disorder treated with sertraline at 100mg and mood stabilized. She reports in her younger 20's she had a lot more irritability and anger. Had one panic attack when she went to first day of college, was able to finally go into building for class. Denies inpt psych admissions or therapy. Denies SI/HI or AVH.  Father attempted suicide when there was marital discord and alcohol involved.      Substance Abuse:   Tobacco smoking began 16yo until 2014 2PPD , none since   Alcohol occasional use only  cannabis denies       ABUSE HX: denies   LEGAL HX: denies     LIZETTE REVIEWED: no red flags       Family Psychiatric History: mother anxiety and depression requiring hospitalization. Dad attempted SI once and hospitalized.   family history includes Bone cancer (age of onset: 50) in her paternal aunt; Breast cancer in an other family member; Heart valve disorder in her father; Hyperlipidemia in her maternal grandfather; Hypertension in her maternal grandmother; Lymphoma (age of onset: 65) in her paternal grandfather; No Known Problems in her brother and paternal grandmother; Stomach cancer in an other family member; Stroke in her maternal grandmother; Thyroid disease in her mother. and cancer .       Social History: raised by her biological parents in [place], [place] and has one paternal twin brother. Both parents are still living in [place]. She graduated from high school and worked, and went to [employer] for medical assistant and got her associates degree. 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 She got  this year and has been going to college working on goal of RN. She and her  both work full time. He works 6 days a week and does outdoor chores and laundry and she does most indoor  chores. She has a dog and loves their home they rent. Her brother works at [employer] as a medical assistant in cardiology.       Medical/Surgical History:  Past Medical History:   Diagnosis Date   • Anxiety and depression    • BMI 40.0-44.9, adult (CMS/HCC)    • Body piercing     ears   • Elevated cholesterol     Reported LDL slightly elevated but has never been Rx medication   • GERD (gastroesophageal reflux disease)    • Headache    • History of pneumonia    • Nephrolithiasis 06/21/2019    Patient reported ER visits but that she has not require surgical intervention in the past   • PCOS (polycystic ovarian syndrome)    • PONV (postoperative nausea and vomiting)      Past Surgical History:   Procedure Laterality Date   • CYSTOSCOPY      Done in the office setting, no anesthesia   • LAPAROSCOPIC CHOLECYSTECTOMY     • URETEROSCOPY LASER LITHOTRIPSY WITH STENT INSERTION Right 7/9/2019    Procedure: CYSTOSCOPY, URETEROSCOPY LASER LITHOTRIPSY WITH BASKET EXTRACTION OF STONE FRAGMENTS, RETROGRADE PYELOGRAM AND STENT INSERTION;  Surgeon: Michael Valerio MD;  Location: [facility];  Service: Urology   • WISDOM TOOTH EXTRACTION         No Known Allergies    Current Medications:   Current Outpatient Medications   Medication Sig Dispense Refill   • busPIRone (BUSPAR) 5 MG tablet Take 1 tablet by mouth 2 (two) times a day. 60 tablet 0   • cholecalciferol (VITAMIN D3) 1000 units tablet Take 1,000 Units by mouth Daily.     • FLUoxetine (PROzac) 40 MG capsule Take 1 capsule by mouth Daily. 30 capsule 5   • ibuprofen (ADVIL,MOTRIN) 600 MG tablet Take 1 tablet by mouth Every 6 (Six) Hours As Needed for Mild Pain . 30 tablet 3   • lansoprazole (PREVACID) 30 MG capsule Take 1 capsule by mouth Daily. 30 capsule 5   • letrozole (FEMARA) 2.5 MG tablet Take 1 tablet by mouth Daily On days 3-7 of menstrual cycle 5 tablet 5   • medroxyPROGESTERone (PROVERA) 10 MG tablet Take 1 tablet by mouth Daily. 10 tablet 5   • mupirocin (BACTROBAN) 2  % ointment Apply topically to the appropriate area as directed 3 (Three) Times a Day. (Patient taking differently: Apply 1 application topically to the appropriate area as directed Daily As Needed (skin irritation).) 30 g 2   • norethindrone (MICRONOR) 0.35 MG tablet Take 1 tablet by mouth Daily. 28 tablet 12   • nystatin (MYCOSTATIN) 547881 UNIT/GM powder Apply  topically 3 (Three) Times a Day As Needed (itching/irritation). 30 g 6   • ondansetron (ZOFRAN) 4 MG tablet Take 1 tablet by mouth Every 6 Hours As Needed for Nausea or Vomiting. 20 tablet 5   • spironolactone (ALDACTONE) 50 MG tablet Take one tablet by mouth daily 30 tablet 5   • vitamin D (ERGOCALCIFEROL) 1.25 MG (05979 UT) capsule capsule Take 1 capsule by mouth 1 (One) Time Per Week. 4 capsule 3     No current facility-administered medications for this visit.        Lab Results:  Lab on 09/11/2020   Component Date Value Ref Range Status   • HCG Quantitative 09/11/2020 <0.50  mIU/mL Final   Lab on 06/05/2020   Component Date Value Ref Range Status   • Serotonin, Serum 06/05/2020 15  0 - 420 ng/mL Final   • Glucose 06/05/2020 101* 65 - 99 mg/dL Final   • BUN 06/05/2020 9  6 - 20 mg/dL Final   • Creatinine 06/05/2020 0.62  0.57 - 1.00 mg/dL Final   • Sodium 06/05/2020 140  136 - 145 mmol/L Final   • Potassium 06/05/2020 4.2  3.5 - 5.2 mmol/L Final   • Chloride 06/05/2020 102  98 - 107 mmol/L Final   • CO2 06/05/2020 25.0  22.0 - 29.0 mmol/L Final   • Calcium 06/05/2020 9.2  8.6 - 10.5 mg/dL Final   • Total Protein 06/05/2020 8.2  6.0 - 8.5 g/dL Final   • Albumin 06/05/2020 4.10  3.50 - 5.20 g/dL Final   • ALT (SGPT) 06/05/2020 24  1 - 33 U/L Final   • AST (SGOT) 06/05/2020 21  1 - 32 U/L Final   • Alkaline Phosphatase 06/05/2020 68  39 - 117 U/L Final   • Total Bilirubin 06/05/2020 0.3  0.2 - 1.2 mg/dL Final   • eGFR Non African Amer 06/05/2020 114  >60 mL/min/1.73 Final   • Globulin 06/05/2020 4.1  gm/dL Final   • A/G Ratio 06/05/2020 1.0  g/dL Final   •  BUN/Creatinine Ratio 06/05/2020 14.5  7.0 - 25.0 Final   • Anion Gap 06/05/2020 13.0  5.0 - 15.0 mmol/L Final   • WBC 06/05/2020 9.60  3.40 - 10.80 10*3/mm3 Final   • RBC 06/05/2020 4.91  3.77 - 5.28 10*6/mm3 Final   • Hemoglobin 06/05/2020 13.2  12.0 - 15.9 g/dL Final   • Hematocrit 06/05/2020 42.3  34.0 - 46.6 % Final   • RDW 06/05/2020 13.4  12.3 - 15.4 % Final   • MCV 06/05/2020 86.0  79.0 - 97.0 fL Final   • MCH 06/05/2020 26.9  26.6 - 33.0 pg Final   • MCHC 06/05/2020 31.3* 31.5 - 35.7 g/dL Final   • MPV 06/05/2020 7.2  6.0 - 12.0 fL Final   • Platelets 06/05/2020 428  140 - 450 10*3/mm3 Final   • Neutrophil % 06/05/2020 59.9  42.7 - 76.0 % Final   • Lymphocyte % 06/05/2020 35.6  19.6 - 45.3 % Final   • Monocyte % 06/05/2020 4.5* 5.0 - 12.0 % Final   • Neutrophils, Absolute 06/05/2020 5.80  1.70 - 7.00 10*3/mm3 Final   • Lymphocytes, Absolute 06/05/2020 3.40* 0.70 - 3.10 10*3/mm3 Final   • Monocytes, Absolute 06/05/2020 0.40  0.10 - 0.90 10*3/mm3 Final         Review of Systems Constitutional: Negative for appetite change, chills, diaphoresis, fatigue, fever and unexpected weight change.   HENT: Negative for hearing loss, sore throat, trouble swallowing and voice change.    Eyes: Negative for photophobia and visual disturbance.   Respiratory: Negative for cough, chest tightness and shortness of breath.    Cardiovascular: Negative for chest pain and palpitations.   Gastrointestinal: Negative for abdominal pain, constipation, nausea and vomiting.   Endocrine: Negative for cold intolerance and heat intolerance.   Genitourinary: Negative for dysuria and frequency.   Musculoskeletal: Negative for arthralgia, back pain, joint swelling and neck stiffness.   Skin: Negative for color change and wound.   Allergic/Immunologic: Negative for environmental allergies and immunocompromised state.   Neurological: Negative for dizziness, tremors, seizures, syncope, weakness, light-headedness and headaches.   Hematological:  Negative for adenopathy. Does not bruise/bleed easily.    Objective   Physical Exam  not currently breastfeeding.      Mental Status Exam:   Appearance: appropriate  Hygiene:   good  Cooperation:  Cooperative  Eye Contact:  Good  Psychomotor Behavior:  Appropriate  Mood:  anxious  Affect:  Appropriate  Hopelessness: Denies  Speech:  Normal  Thought Process:  Linear  Thought Content:  Normal  Suicidal:  None  Homicidal:  None  Hallucinations:  None  Delusion:  None  Memory:  Intact  Orientation:  Person, Place, Time and Situation  Reliability:  good  Insight:  Good  Judgement:  Good  Impulse Control:  Good  Physical/Medical Issues:  Yes PCOS      Short-term goals: Patient will be compliant with clinic appointments.  Patient will be engaged in therapy, medication compliant with minimal side effects. Patient  will report decrease of symptoms and frequency.    Long-term goals: Patient will have minimal symptoms of mental health disorder with continued treatment. Patient will be compliant with treatment and appointments.       Problem list: JON,   Strengths: patient appears motivated for treatment          Assessment/Plan   Diagnoses and all orders for this visit:    1. Generalized anxiety disorder (Primary)    Other orders  -     busPIRone (BUSPAR) 5 MG tablet; Take 1 tablet by mouth 2 (two) times a day.  Dispense: 60 tablet; Refill: 0        A psychological evaluation was conducted in order to assess past and current level of functioning. Areas assessed included, but were not limited to: perception of social support, perception of ability to face and deal with challenges in life (positive functioning), anxiety symptoms, depressive symptoms, perspective on beliefs/belief system, coping skills for stress, intelligence level,  Therapeutic rapport was established. Interventions conducted today were geared towards incorporating medication management along with support for continued therapy. Education was also provided as to  the med management with this provider and what to expect in subsequent sessions.    Assisted patient in processing above session content; acknowledged and normalized patient’s thoughts, feelings, and concerns.  Applied  positive coping skills and behavior management in session.  Allowed patient to freely discuss issues without interruption or judgment. Provided safe, confidential environment to facilitate the development of positive therapeutic relationship and encourage open, honest communication. Assisted patient in identifying risk factors which would indicate the need for higher level of care including thoughts to harm self or others and/or self-harming behavior and encouraged patient to contact this office, call 911, or present to the nearest emergency room should any of these events occur. Discussed crisis intervention services and means to access.  Patient adamantly and convincingly denies current suicidal or homicidal ideation or perceptual disturbance.    Discussed diagnosis and recommendations for treatment:  Coping skills for anxiety, relaxation techniques, exercise, guided imagery,     MEDICATION MANAGEMENT RECOMMENDATIONS:  Because goal is also to get pregnant recommend buspirone 5mg po bid  Stay on fluoxetine 40 mg po one qd for now, in couple weeks will readdress and if anxiety down will wean off fluoxetine, will adjust buspirone as needed. Safe during pregnancy. Also discussed escitalopram during pregnancy as possible     We discussed risks, benefits,goals and side effects of the above medication and the patient was agreeable with the plan.Patient was educated on the importance of compliance with treatment and follow-up appointments.To call for questions or concerns and return early if necessary. Crisis plan reviewed including going to the Emergency department.       Treatment Plan: stabilize mood,  patient will stay out of the hospital and be at optimal level of functioning, take all medication as  prescribed. Patient verbalized  understanding and agreement to plan.      Return in about 3 weeks (around 11/19/2020).

## 2020-10-30 DIAGNOSIS — Z31.69 PRE-CONCEPTION COUNSELING: ICD-10-CM

## 2020-10-30 DIAGNOSIS — N92.6 IRREGULAR MENSES: ICD-10-CM

## 2020-10-30 DIAGNOSIS — E28.2 PCOS (POLYCYSTIC OVARIAN SYNDROME): Primary | ICD-10-CM

## 2020-11-12 DIAGNOSIS — E28.2 PCOS (POLYCYSTIC OVARIAN SYNDROME): Primary | ICD-10-CM

## 2020-11-19 ENCOUNTER — OFFICE VISIT (OUTPATIENT)
Dept: PSYCHIATRY | Facility: CLINIC | Age: 30
End: 2020-11-19

## 2020-11-19 DIAGNOSIS — F41.1 GENERALIZED ANXIETY DISORDER: Primary | ICD-10-CM

## 2020-11-19 DIAGNOSIS — F41.0 PANIC DISORDER: ICD-10-CM

## 2020-11-19 PROCEDURE — 99214 OFFICE O/P EST MOD 30 MIN: CPT | Performed by: NURSE PRACTITIONER

## 2020-11-19 RX ORDER — BUSPIRONE HYDROCHLORIDE 10 MG/1
10 TABLET ORAL 2 TIMES DAILY
Qty: 60 TABLET | Refills: 1 | Status: SHIPPED | OUTPATIENT
Start: 2020-11-19 | End: 2021-01-12 | Stop reason: SDUPTHER

## 2020-11-19 RX ORDER — FLUCONAZOLE 150 MG/1
150 TABLET ORAL
Qty: 3 TABLET | Refills: 1 | Status: SHIPPED | OUTPATIENT
Start: 2020-11-19 | End: 2021-04-25

## 2020-11-19 NOTE — PROGRESS NOTES
Subjective   Taylor Rivas is a 29 y.o. female who is here today for medication management follow up. in person with Covid precautions taken     TIME IN:4:p  TIME OUT: 4:30p    Chief Complaint: JON, panic     History of Present Illness Patient reports first two weeks the buspirone 5 mg twice a day was very helpful and stopped the rapid heart rate, trembling, internal pressure she felt with anxious mood. However past week she has noticed the night before coming to work she had all those symptoms coming back and has been difficult to manage with relaxation techniques. She reports sleep had improved as well but now more restless no nightmares. 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 She states home life is wonderful and she and her  get along very well wanting to start a family in January.  Acknowledged and normalized patient's thoughts, feelings, and concerns. Reinforced boundaries at work and boundaries on not taking on other's emotions and how to practice that. Being in charge of her own emotions and others theirs.  Denies adverse effects from medications.   (Scales based on 0 - 10 with 10 being the worst)      The following portions of the patient's history were reviewed and updated as appropriate: allergies, current medications, past family history, past medical history, past social history, past surgical history and problem list.    Review of Systems  A 14 point review of systems was performed and is negative except as noted above.    Objective   Physical Exam  not currently breastfeeding.    No Known Allergies    Current Medications:   Current Outpatient Medications   Medication Sig Dispense Refill   • busPIRone (BUSPAR) 10 MG tablet Take 1 tablet by mouth 2 (two) times a day. 60 tablet 1   • cholecalciferol (VITAMIN D3) 1000 units tablet Take 1,000 Units by mouth Daily.     • fluconazole (Diflucan)  150 MG tablet Take 1 tablet by mouth Every 3 (Three) Days for 3 doses. 3 tablet 1   • FLUoxetine (PROzac) 40 MG capsule Take 1 capsule by mouth Daily. 30 capsule 5   • ibuprofen (ADVIL,MOTRIN) 600 MG tablet Take 1 tablet by mouth Every 6 (Six) Hours As Needed for Mild Pain . 30 tablet 3   • lansoprazole (PREVACID) 30 MG capsule Take 1 capsule by mouth Daily. 30 capsule 5   • letrozole (FEMARA) 2.5 MG tablet Take 1 tablet by mouth Daily On days 3-7 of menstrual cycle 5 tablet 5   • medroxyPROGESTERone (PROVERA) 10 MG tablet Take 1 tablet by mouth Daily. 10 tablet 5   • mupirocin (BACTROBAN) 2 % ointment Apply topically to the appropriate area as directed 3 (Three) Times a Day. (Patient taking differently: Apply 1 application topically to the appropriate area as directed Daily As Needed (skin irritation).) 30 g 2   • norethindrone (MICRONOR) 0.35 MG tablet Take 1 tablet by mouth Daily. 28 tablet 12   • nystatin (MYCOSTATIN) 766606 UNIT/GM powder Apply  topically 3 (Three) Times a Day As Needed (itching/irritation). 30 g 6   • ondansetron (ZOFRAN) 4 MG tablet Take 1 tablet by mouth Every 6 Hours As Needed for Nausea or Vomiting. 20 tablet 5   • spironolactone (ALDACTONE) 50 MG tablet Take one tablet by mouth daily 30 tablet 5   • vitamin D (ERGOCALCIFEROL) 1.25 MG (11381 UT) capsule capsule Take 1 capsule by mouth 1 (One) Time Per Week. 4 capsule 3     No current facility-administered medications for this visit.        Appearance: WNL  Hygiene:   good  Cooperation:  Cooperative  Eye Contact: good direct   Psychomotor Behavior:  no psychomotor agitation/retardation, No EPS, No motor tics  Mood:  Anxious   Affect:  Congruent appropriate   Hopelessness: Denies  Speech:  Normal  Thought Process:  Linear  Thought Content:  Normal  Concentration: Normal   Suicidal: denies  Homicidal:  None  Hallucinations:  None  Delusion:  None  Memory:  Intact  Orientation:  Person, Place, Time and Situation  Reliability:  good  Insight:   good  Judgement: good  Impulse Control: good  Estimated Intelligence: average range or above    LIZETTE REVIEWED NO RED FLAGS 11.19.2020    Assessment/Plan   Diagnoses and all orders for this visit:    1. Generalized anxiety disorder (Primary)    2. Panic disorder    Other orders  -     busPIRone (BUSPAR) 10 MG tablet; Take 1 tablet by mouth 2 (two) times a day.  Dispense: 60 tablet; Refill: 1          IMPRESSION: initially had good response from buspirone at 5mg BID. However less effective with break through high anxiety episodes mainly before having to come in work and during work    PLAN: increase buspirone to 10 mg bid, first begin with 5 mg in am and 10 mg at hs, if cont to have break through anxiety near panic episodes after two weeks increase to 10 mg bid, pt verbalized her understanding and agreement.    We discussed risks, benefits, and side effects of the above medications and the patient was agreeable with the plan. Patient was educated on the importance of compliance with treatment and follow-up appointments.     Supportive Expressive Therapy to improve functioning, maintain stability, and avoid decompensation and the need for higher level of care.    Counseled patient regarding multimodal approach with encouragement of healthy nutrition, healthy sleep, regular physical mobility, social involvement, counseling, and medication compliance.     Assisted patient in identifying risk factors which would indicate the need for higher level of care including thoughts to harm self or others and/or self-harming behavior and encouraged patient to contact this office, call 911, or present to the nearest emergency room should any of these events occur. Discussed crisis intervention services and means to access.  Patient adamantly and convincingly denies current suicidal or homicidal ideation or perceptual disturbance.    Treatment Plan: stabilize mood, patient will stay out of psychiatric hospital and be at optimal  level of functioning with therapy and take all medication as prescribed. Patient verbalized  understanding and agreement to plan.    Instructed to call for questions or concerns and return early if necessary.     Greater than 50% time was spent in coordination of care, and counseling the patient regarding current assessment, symptoms, plan of care going forward, supportive therapy.  Answered any questions patient had regarding medications and plan of care.    Return in about 6 weeks (around 12/31/2020).

## 2021-01-04 PROBLEM — Z01.419 WELL WOMAN EXAM: Status: ACTIVE | Noted: 2021-01-04

## 2021-01-07 ENCOUNTER — OFFICE VISIT (OUTPATIENT)
Dept: OBSTETRICS AND GYNECOLOGY | Facility: CLINIC | Age: 31
End: 2021-01-07

## 2021-01-07 VITALS
BODY MASS INDEX: 42.78 KG/M2 | SYSTOLIC BLOOD PRESSURE: 104 MMHG | HEIGHT: 64 IN | WEIGHT: 250.6 LBS | DIASTOLIC BLOOD PRESSURE: 80 MMHG

## 2021-01-07 DIAGNOSIS — N97.0 FEMALE INFERTILITY ASSOCIATED WITH ANOVULATION: Primary | ICD-10-CM

## 2021-01-07 DIAGNOSIS — E28.2 PCOS (POLYCYSTIC OVARIAN SYNDROME): ICD-10-CM

## 2021-01-07 PROCEDURE — 99203 OFFICE O/P NEW LOW 30 MIN: CPT | Performed by: OBSTETRICS & GYNECOLOGY

## 2021-01-07 NOTE — PROGRESS NOTES
Subjective   Chief Complaint   Patient presents with   • Establish Care     New pt. pt referred for PCOS, irregular menses, and preconception counseling     Taylor Rivas is a 30 y.o. year old .  Patient's last menstrual period was 11/15/2020 (within weeks).  She presents to be seen because of desire to talk about preconception counseling and her history of irregular menses with PCOS.  Reviewed patient's history with her and she has been trying to conceive overall for about 3 years.  She reports she is really only had one period on her own since starting menarche and has had to take either Provera withdrawal test or a birth control pill.  She has previously had lab work-up confirming her clinical impression of PCOS.  She reports that she got  this past year and they additionally added for marijuana with progesterone withdrawal with the another GYN provider.  She reports she did 2 cycles of this without success.  She has questions regarding what next steps would be.  Her partner has never had a semen analysis and he has never fathered any other children.    OTHER THINGS SHE WANTS TO DISCUSS TODAY:  Nothing else    The following portions of the patient's history were reviewed and updated as appropriate:current medications, allergies, past family history, past medical history, past social history and past surgical history    Social History    Tobacco Use      Smoking status: Former Smoker        Packs/day: 1.00        Years: 6.00        Pack years: 6        Types: Cigarettes        Start date: 2/15/2008        Quit date: 2/15/2014        Years since quittin.8      Smokeless tobacco: Never Used    Review of Systems  Constitutional POS: nothing reported    NEG: anorexia or night sweats   Genitourinary POS: nothing reported    NEG: dysuria or hematuria   Gastointestinal POS: nothing reported    NEG: bloating, change in bowel habits, melena or reflux symptoms   Integument POS: nothing reported    NEG:  "moles that are changing in size, shape, color or rashes   Breast POS: nothing reported    NEG: persistent breast lump, skin dimpling or nipple discharge         Objective   /80   Ht 162.6 cm (64\")   Wt 114 kg (250 lb 9.6 oz)   LMP 11/15/2020 (Within Weeks) Comment: last period was in Nov. pt just cant remember the day  Breastfeeding No   BMI 43.02 kg/m²     General:  well developed; well nourished  no acute distress   Skin:  Not performed.   Thyroid: not examined   Lungs:  breathing is unlabored   Heart:  Not performed.   Breasts:  Not performed.   Abdomen: Not performed.   Pelvis: Not performed.     Lab Review   Prior PCOS labs    Imaging   No data reviewed        Assessment   1. Female infertility most likely due to anovulation  2. PCOS     Plan   1. Reviewed with patient that I agree with work-up and treatment up-to-date.  Reviewed that I usually use the Femara for 3-4 cycles and if no success refer to infertility.  We had an in-depth discussion about risks benefits and desires moving forward and came to the conclusion to go ahead and do a referral to an infertility specialist for further work-up.  Patient knows to call the office if she has not heard from them within the next 1 to 2 weeks.  I told patient if she still has the Femara prescription that she can always do a repeat dose if she wanted to before or waiting to see them.  2. The importance of keeping all planned follow-up and taking all medications as prescribed was emphasized.  3. Follow up for annual exam in 3 months    No orders of the defined types were placed in this encounter.         This note was electronically signed.    Cathryn Foss MD  January 7, 2021    Note: Speech recognition transcription software may have been used to create portions of this document.  An attempt at proofreading has been made but errors in transcription could still be present.  "

## 2021-01-11 ENCOUNTER — APPOINTMENT (OUTPATIENT)
Dept: VACCINE CLINIC | Facility: HOSPITAL | Age: 31
End: 2021-01-11

## 2021-01-12 ENCOUNTER — OFFICE VISIT (OUTPATIENT)
Dept: PSYCHIATRY | Facility: CLINIC | Age: 31
End: 2021-01-12

## 2021-01-12 DIAGNOSIS — F41.0 PANIC DISORDER: ICD-10-CM

## 2021-01-12 DIAGNOSIS — F41.1 GENERALIZED ANXIETY DISORDER: Primary | ICD-10-CM

## 2021-01-12 PROCEDURE — 99214 OFFICE O/P EST MOD 30 MIN: CPT | Performed by: NURSE PRACTITIONER

## 2021-01-12 RX ORDER — BUSPIRONE HYDROCHLORIDE 10 MG/1
10 TABLET ORAL 3 TIMES DAILY
Qty: 90 TABLET | Refills: 1 | Status: SHIPPED | OUTPATIENT
Start: 2021-01-12 | End: 2021-05-27 | Stop reason: SDUPTHER

## 2021-01-12 NOTE — PROGRESS NOTES
Subjective   Taylor Rivas is a 30 y.o. female who is here today for medication management follow up. You have chosen to receive care through a telephone visit. Do you consent to use a telephone visit for your medical care today? Yes    TIME IN:       3:52p  TIME OUT:  4:20p    Chief Complaint: JON, panic      History of Present Illness Patient reports she has been under a lot of stress and went ahead and increased the buspar to TID and it helped. She states less rumination, less panic feelings and rates anxiety about a 4. Her cat  today so she feels sad but will be back to work tomorrow. Her mother has been diagnosed with renal cancer and that was an incidental finding so hoping they caught it in time, she will need a nephrectomy.  Patient also reports she and her  will be going to an appointment on  to see Dr. Babb and infertility specialist.  She probably will be given infertility treatment to stimulate her ovary follicles and then have artificial insemination with her  sperm.  Patient is on Prozac and we had discussed coming off Prozac if she started infertility.  Patient had been on sertraline in the past but it stopped working for her.  Discussed Escitalopram that she can be on during pregnancy along with the BuSpar.  Patient reports sleeping well denies adverse effects from medications.   (Scales based on 0 - 10 with 10 being the worst)        The following portions of the patient's history were reviewed and updated as appropriate: allergies, current medications, past family history, past medical history, past social history, past surgical history and problem list.    Review of Systems  A 14 point review of systems was performed and is negative except as noted above.    Objective   Physical Exam  not currently breastfeeding.    No Known Allergies    Current Medications:   Current Outpatient Medications   Medication Sig Dispense Refill   • busPIRone (BUSPAR) 10 MG tablet Take  1 tablet by mouth 3 (Three) Times a Day. 90 tablet 1   • FLUoxetine (PROzac) 40 MG capsule Take 1 capsule by mouth Daily. 30 capsule 5   • ibuprofen (ADVIL,MOTRIN) 600 MG tablet Take 1 tablet by mouth Every 6 (Six) Hours As Needed for Mild Pain . 30 tablet 3   • letrozole (FEMARA) 2.5 MG tablet Take 1 tablet by mouth Daily On days 3-7 of menstrual cycle 5 tablet 5   • medroxyPROGESTERone (PROVERA) 10 MG tablet Take 1 tablet by mouth Daily. 10 tablet 5   • mupirocin (BACTROBAN) 2 % ointment Apply topically to the appropriate area as directed 3 (Three) Times a Day. (Patient taking differently: Apply 1 application topically to the appropriate area as directed Daily As Needed (skin irritation).) 30 g 2   • nystatin (MYCOSTATIN) 637113 UNIT/GM powder Apply  topically 3 (Three) Times a Day As Needed (itching/irritation). 30 g 6   • ondansetron (ZOFRAN) 4 MG tablet Take 1 tablet by mouth Every 6 Hours As Needed for Nausea or Vomiting. 20 tablet 5   • Prenatal Vit-Fe Fumarate-FA (PRENATAL VITAMIN PO) Take  by mouth.     • vitamin D (ERGOCALCIFEROL) 1.25 MG (65014 UT) capsule capsule Take 1 capsule by mouth 1 (One) Time Per Week. 4 capsule 3     No current facility-administered medications for this visit.        Lab Results:  Lab on 09/11/2020   Component Date Value Ref Range Status   • HCG Quantitative 09/11/2020 <0.50  mIU/mL Final     Appearance: N/A  Hygiene:  REPORTS good  Cooperation:  Cooperative  Eye Contact: NA  Psychomotor Behavior:  denies psychomotor agitation/retardation, No EPS, No motor tics  Mood: Anxious sad  Affect: NA  Hopelessness: Denies  Speech:  Normal  Thought Process:  Linear  Thought Content:  Normal  Concentration: Normal   Suicidal: denies  Homicidal:  None  Hallucinations:  None  Delusion:  None  Memory:  Intact  Orientation:  Person, Place, Time and Situation  Reliability:  good  Insight:  Fair  Judgement: good  Impulse Control: good  Estimated Intelligence: average range    LIZETTE REVIEWED NO  RED FLAGS    Assessment/Plan   Diagnoses and all orders for this visit:    1. Generalized anxiety disorder (Primary)    2. Panic disorder    Other orders  -     busPIRone (BUSPAR) 10 MG tablet; Take 1 tablet by mouth 3 (Three) Times a Day.  Dispense: 90 tablet; Refill: 1      IMPRESSION: Circumstantial sadness death of her cat today, anxiety managed with increase in BuSpar     PLAN: Increase BuSpar 10 mg 3 times daily for anxiety dispense 90 with 1 refill  Reduce Prozac to 20 mg 1 p.o. daily Will discuss with her how she is feeling in 1 week and then will transition cross-taper to Lexapro 10 mg daily for anxiety and will be more compatible with being pregnant in the future.  Risk benefits of Lexapro during pregnancy discussed patient agreeable to plan    We discussed risks, benefits, and side effects of the above medications and the patient was agreeable with the plan. Patient was educated on the importance of compliance with treatment and follow-up appointments.     Counseled patient regarding multimodal approach with encouragement of healthy nutrition, healthy sleep, regular physical mobility, social involvement, counseling, and medication compliance.     Assisted patient in identifying risk factors which would indicate the need for higher level of care including thoughts to harm self or others and/or self-harming behavior and encouraged patient to contact this office, call 911, or present to the nearest emergency room should any of these events occur. Discussed crisis intervention services and means to access.  Patient adamantly and convincingly denies current suicidal or homicidal ideation or perceptual disturbance.    Treatment Plan: stabilize mood, patient will stay out of psychiatric hospital and be at optimal level of functioning with therapy and take all medication as prescribed. Patient verbalized  understanding and agreement to plan.    Instructed to call for questions or concerns and return early if  necessary.     Greater than 50% time was spent in coordination of care, and counseling the patient regarding current assessment, symptoms, plan of care going forward, supportive therapy.  Answered any questions patient had regarding medications and plan of care.    Return in about 4 weeks (around 2/9/2021).

## 2021-01-25 RX ORDER — ESCITALOPRAM OXALATE 10 MG/1
10 TABLET ORAL DAILY
Qty: 90 TABLET | Refills: 1 | Status: SHIPPED | OUTPATIENT
Start: 2021-01-25 | End: 2021-08-17 | Stop reason: SDUPTHER

## 2021-01-26 ENCOUNTER — IMMUNIZATION (OUTPATIENT)
Dept: VACCINE CLINIC | Facility: HOSPITAL | Age: 31
End: 2021-01-26

## 2021-01-26 PROCEDURE — 91300 HC SARSCOV02 VAC 30MCG/0.3ML IM: CPT | Performed by: INTERNAL MEDICINE

## 2021-01-26 PROCEDURE — 0001A: CPT | Performed by: INTERNAL MEDICINE

## 2021-02-09 RX ORDER — SULFAMETHOXAZOLE AND TRIMETHOPRIM 800; 160 MG/1; MG/1
1 TABLET ORAL 2 TIMES DAILY
Qty: 10 TABLET | Refills: 0 | Status: SHIPPED | OUTPATIENT
Start: 2021-02-09 | End: 2021-02-14

## 2021-02-15 RX ORDER — FLUCONAZOLE 150 MG/1
150 TABLET ORAL
Qty: 3 TABLET | Refills: 2 | Status: SHIPPED | OUTPATIENT
Start: 2021-02-15 | End: 2021-05-17

## 2021-02-16 ENCOUNTER — APPOINTMENT (OUTPATIENT)
Dept: VACCINE CLINIC | Facility: HOSPITAL | Age: 31
End: 2021-02-16

## 2021-02-22 ENCOUNTER — LAB (OUTPATIENT)
Dept: LAB | Facility: HOSPITAL | Age: 31
End: 2021-02-22

## 2021-02-22 DIAGNOSIS — E28.2 PCOS (POLYCYSTIC OVARIAN SYNDROME): Primary | ICD-10-CM

## 2021-02-22 DIAGNOSIS — E28.2 PCOS (POLYCYSTIC OVARIAN SYNDROME): ICD-10-CM

## 2021-02-22 LAB
LH SERPL-ACNC: 7.9 MIU/ML
PROGEST SERPL-MCNC: 0.07 NG/ML

## 2021-02-22 PROCEDURE — 83002 ASSAY OF GONADOTROPIN (LH): CPT

## 2021-02-22 PROCEDURE — 84144 ASSAY OF PROGESTERONE: CPT

## 2021-02-22 PROCEDURE — 36415 COLL VENOUS BLD VENIPUNCTURE: CPT

## 2021-02-23 ENCOUNTER — IMMUNIZATION (OUTPATIENT)
Dept: VACCINE CLINIC | Facility: HOSPITAL | Age: 31
End: 2021-02-23

## 2021-02-23 PROCEDURE — 0002A: CPT | Performed by: INTERNAL MEDICINE

## 2021-02-23 PROCEDURE — 91300 HC SARSCOV02 VAC 30MCG/0.3ML IM: CPT | Performed by: INTERNAL MEDICINE

## 2021-03-16 ENCOUNTER — OFFICE VISIT (OUTPATIENT)
Dept: OBSTETRICS AND GYNECOLOGY | Facility: CLINIC | Age: 31
End: 2021-03-16

## 2021-03-16 VITALS
DIASTOLIC BLOOD PRESSURE: 62 MMHG | HEIGHT: 64 IN | BODY MASS INDEX: 44.08 KG/M2 | WEIGHT: 258.2 LBS | SYSTOLIC BLOOD PRESSURE: 122 MMHG

## 2021-03-16 DIAGNOSIS — E28.2 PCOS (POLYCYSTIC OVARIAN SYNDROME): ICD-10-CM

## 2021-03-16 DIAGNOSIS — N97.0 FEMALE INFERTILITY ASSOCIATED WITH ANOVULATION: ICD-10-CM

## 2021-03-16 DIAGNOSIS — Z01.419 WELL WOMAN EXAM WITH ROUTINE GYNECOLOGICAL EXAM: Primary | ICD-10-CM

## 2021-03-16 PROCEDURE — 99395 PREV VISIT EST AGE 18-39: CPT | Performed by: OBSTETRICS & GYNECOLOGY

## 2021-03-16 NOTE — PROGRESS NOTES
Subjective   Chief Complaint   Patient presents with   • Gynecologic Exam     annual; no c/o     Taylor Rivas is a 30 y.o. year old  presenting to be seen for her annual exam.     SEXUAL Hx:  She is currently sexually active.  In the past year there there has been NO new sexual partners.    Condoms are never used.  She would not like to be screened for STD's at today's exam.  Current birth control method: not using any form of contraception because she is currently trying to conceive.  She is happy with her current method of contraception and does not want to discuss alternative methods of contraception.  MENSTRUAL Hx:  Patient's last menstrual period was 2020 (exact date).  In the past 6 months her cycles have been regular, predictable and occur monthly.  Her menstrual have been present due to progesterone withdrawal medication .   Each month on average there are roughly 0 day(s) of very heavy flow.    Intermenstrual bleeding is absent.    Post-coital bleeding is absent.  Dysmenorrhea: none  PMS: none  Her cycles are not a source of concern for her that she wishes to discuss today.  HEALTH Hx:  She exercises regularly: no but planning to soon at [employer].   She wears her seat belt: yes.  She has concerns about domestic violence: no.  OTHER THINGS SHE WANTS TO DISCUSS TODAY:  She has see Dr. Fajardo 1-2 times and is on Femara 7.5 mg daily.  Her progesterone was low when last checked but they are to do another monthly cycle and if this does not work then most likely HSG.  She also started laser hair removal treatments of her chin.    The following portions of the patient's history were reviewed and updated as appropriate:problem list, current medications, allergies, past family history, past medical history, past social history and past surgical history.    Social History    Tobacco Use      Smoking status: Former Smoker        Packs/day: 1.00        Years: 6.00        Pack years: 6        Types:  "Cigarettes        Start date: 2/15/2008        Quit date: 2/15/2014        Years since quittin.0      Smokeless tobacco: Never Used    Review of Systems  Constitutional POS: nothing reported    NEG: anorexia or night sweats   Genitourinary POS: nothing reported    NEG: dysuria or hematuria      Gastointestinal POS: nothing reported    NEG: bloating, change in bowel habits, melena or reflux symptoms   Integument POS: nothing reported    NEG: moles that are changing in size, shape, color or rashes   Breast POS: nothing reported    NEG: persistent breast lump, skin dimpling or nipple discharge        Objective   /62   Ht 162.6 cm (64\")   Wt 117 kg (258 lb 3.2 oz)   LMP 2020 (Exact Date)   Breastfeeding No   BMI 44.32 kg/m²     General:  well developed; well nourished  no acute distress   Skin:  No suspicious lesions seen   Thyroid: normal to inspection and palpation   Breasts:  Examined in supine position  Symmetric without masses or skin dimpling  Nipples normal without inversion, lesions or discharge  There are no palpable axillary nodes   Abdomen: soft, non-tender; no masses  no umbilical or inguinal hernias are present  no hepato-splenomegaly   Pelvis: Clinical staff was present for exam  External genitalia:  normal appearance of the external genitalia including Bartholin's and [place]'s glands.  :  urethral meatus normal;  Vaginal:  normal pink mucosa without prolapse or lesions.  Cervix:  normal appearance.  Uterus:  normal size, shape and consistency.  Adnexa:  normal bimanual exam of the adnexa.  Rectal:  digital rectal exam not performed; anus visually normal appearing.        Assessment   1. Normal GYN exam  2. Female infertility most likely due to anovulation - seeing RICARDO Fajardo with Femara currently      Plan   Pap was not done today.  I explained to Taylor that the recommendations for Pap smear interval in a low risk patient has lengthened to 3 years time.  I told Taylor she " still needs to be seen in our office yearly for a full physical including breast and pelvic exam.  Reviewed importance of exercise 2-3 times per day for at least 20 to 30 minutes  No prescription was given or electronically sent at today's visit  The importance of keeping all planned follow-up and taking all medications as prescribed was emphasized.  Follow up for annual exam in one year    No orders of the defined types were placed in this encounter.         This note was electronically signed.    Cathryn Foss MD  March 16, 2021    Note: Speech recognition transcription software may have been used to create portions of this document.  An attempt at proofreading has been made but errors in transcription could still be present.

## 2021-04-01 DIAGNOSIS — Z00.00 PERIODIC HEALTH ASSESSMENT, GENERAL SCREENING, ADULT: Primary | ICD-10-CM

## 2021-04-08 ENCOUNTER — LAB (OUTPATIENT)
Dept: LAB | Facility: HOSPITAL | Age: 31
End: 2021-04-08

## 2021-04-08 DIAGNOSIS — Z00.00 PERIODIC HEALTH ASSESSMENT, GENERAL SCREENING, ADULT: ICD-10-CM

## 2021-04-08 LAB — HBV SURFACE AB SER RIA-ACNC: REACTIVE

## 2021-04-08 PROCEDURE — 86706 HEP B SURFACE ANTIBODY: CPT

## 2021-04-08 PROCEDURE — 36415 COLL VENOUS BLD VENIPUNCTURE: CPT

## 2021-04-14 DIAGNOSIS — Z01.419 WELL WOMAN EXAM: Primary | ICD-10-CM

## 2021-04-14 DIAGNOSIS — Z13.9 SCREENING FOR CONDITION: ICD-10-CM

## 2021-05-13 DIAGNOSIS — N76.0 ACUTE VAGINITIS: Primary | ICD-10-CM

## 2021-05-13 PROCEDURE — 87798 DETECT AGENT NOS DNA AMP: CPT | Performed by: NURSE PRACTITIONER

## 2021-05-13 PROCEDURE — 87801 DETECT AGNT MULT DNA AMPLI: CPT | Performed by: NURSE PRACTITIONER

## 2021-05-13 PROCEDURE — 87661 TRICHOMONAS VAGINALIS AMPLIF: CPT | Performed by: NURSE PRACTITIONER

## 2021-05-16 LAB
A VAGINAE DNA VAG QL NAA+PROBE: ABNORMAL SCORE
BVAB2 DNA VAG QL NAA+PROBE: ABNORMAL SCORE
C ALBICANS DNA VAG QL NAA+PROBE: POSITIVE
C GLABRATA DNA VAG QL NAA+PROBE: NEGATIVE
MEGA1 DNA VAG QL NAA+PROBE: ABNORMAL SCORE
T VAGINALIS DNA VAG QL NAA+PROBE: NEGATIVE

## 2021-05-17 RX ORDER — FLUCONAZOLE 100 MG/1
100 TABLET ORAL DAILY
Qty: 7 TABLET | Refills: 0 | Status: SHIPPED | OUTPATIENT
Start: 2021-05-17 | End: 2021-06-28

## 2021-05-25 ENCOUNTER — TRANSCRIBE ORDERS (OUTPATIENT)
Dept: ADMINISTRATIVE | Facility: HOSPITAL | Age: 31
End: 2021-05-25

## 2021-05-25 DIAGNOSIS — Z31.41 FERTILITY TESTING: Primary | ICD-10-CM

## 2021-05-27 RX ORDER — BUSPIRONE HYDROCHLORIDE 10 MG/1
10 TABLET ORAL 3 TIMES DAILY
Qty: 270 TABLET | Refills: 2 | Status: SHIPPED | OUTPATIENT
Start: 2021-05-27 | End: 2022-10-11 | Stop reason: SINTOL

## 2021-06-01 ENCOUNTER — HOSPITAL ENCOUNTER (OUTPATIENT)
Dept: GENERAL RADIOLOGY | Facility: HOSPITAL | Age: 31
Discharge: HOME | End: 2021-06-01
Admitting: SPECIALIST

## 2021-06-01 DIAGNOSIS — Z31.41 FERTILITY TESTING: ICD-10-CM

## 2021-06-01 PROCEDURE — 74740 X-RAY FEMALE GENITAL TRACT: CPT

## 2021-06-01 PROCEDURE — 25010000002 IOPAMIDOL 61 % SOLUTION: Performed by: SPECIALIST

## 2021-06-01 RX ADMIN — IOPAMIDOL 20 ML: 612 INJECTION, SOLUTION INTRAVENOUS at 08:07

## 2021-06-02 RX ORDER — CEPHALEXIN 500 MG/1
500 CAPSULE ORAL 2 TIMES DAILY
Qty: 10 CAPSULE | Refills: 0 | Status: SHIPPED | OUTPATIENT
Start: 2021-06-02 | End: 2021-10-18

## 2021-06-15 ENCOUNTER — LAB (OUTPATIENT)
Dept: LAB | Facility: HOSPITAL | Age: 31
End: 2021-06-15

## 2021-06-15 DIAGNOSIS — Z13.9 SCREENING FOR CONDITION: ICD-10-CM

## 2021-06-15 PROCEDURE — 86481 TB AG RESPONSE T-CELL SUSP: CPT

## 2021-06-15 PROCEDURE — 36415 COLL VENOUS BLD VENIPUNCTURE: CPT

## 2021-06-16 ENCOUNTER — TELEPHONE (OUTPATIENT)
Dept: OBSTETRICS AND GYNECOLOGY | Facility: CLINIC | Age: 31
End: 2021-06-16

## 2021-06-16 NOTE — TELEPHONE ENCOUNTER
----- Message from Taylor Rivas sent at 6/16/2021  1:27 PM EDT -----  Regarding: Non-Urgent Medical Question  Contact: [phone]  Good afternoon!   I was wondering if I could get the Gaurdasil shot. Could I get it as a MA visit? Or should I be seen first?     Beatris

## 2021-06-16 NOTE — TELEPHONE ENCOUNTER
Attempted to call the patient to ask some additional questions. From review of her chart, it looks like she is undergoing fertility treatments to become pregnant. The HPV vaccination is not recommend in pregnancy. If she is not pregnant then it would be okay to get the HPV vaccine series, however if she is to become pregnant after initiating the series the remaining doses should be delayed until after pregnancy.

## 2021-06-17 LAB
TSPOT INTERPRETATION: NEGATIVE
TSPOT NIL CONTROL INTERPRETATION: NORMAL
TSPOT PANEL A: 0
TSPOT PANEL B: 0
TSPOT POS CONTROL INTERPRETATION: NORMAL

## 2021-06-28 RX ORDER — FLUCONAZOLE 150 MG/1
150 TABLET ORAL EVERY OTHER DAY
Qty: 3 TABLET | Refills: 3 | Status: SHIPPED | OUTPATIENT
Start: 2021-06-28 | End: 2022-05-24

## 2021-07-02 DIAGNOSIS — N32.89 BLADDER SPASMS: Primary | ICD-10-CM

## 2021-07-02 LAB
BILIRUB UR QL STRIP: NEGATIVE
CLARITY UR: CLEAR
COLOR UR: YELLOW
GLUCOSE UR STRIP-MCNC: NEGATIVE MG/DL
HGB UR QL STRIP.AUTO: NEGATIVE
KETONES UR QL STRIP: NEGATIVE
LEUKOCYTE ESTERASE UR QL STRIP.AUTO: NEGATIVE
NITRITE UR QL STRIP: NEGATIVE
PH UR STRIP.AUTO: 6.5 [PH] (ref 5–8)
PROT UR QL STRIP: NEGATIVE
SP GR UR STRIP: <=1.005 (ref 1–1.03)
UROBILINOGEN UR QL STRIP: NORMAL

## 2021-07-02 PROCEDURE — 81003 URINALYSIS AUTO W/O SCOPE: CPT | Performed by: OBSTETRICS & GYNECOLOGY

## 2021-07-23 RX ORDER — ONDANSETRON 4 MG/1
4 TABLET, FILM COATED ORAL EVERY 6 HOURS PRN
Qty: 20 TABLET | Refills: 5 | Status: SHIPPED | OUTPATIENT
Start: 2021-07-23

## 2021-07-31 ENCOUNTER — OFFICE VISIT (OUTPATIENT)
Dept: INTERNAL MEDICINE | Facility: CLINIC | Age: 31
End: 2021-07-31

## 2021-07-31 VITALS
TEMPERATURE: 97.3 F | HEART RATE: 88 BPM | BODY MASS INDEX: 44.22 KG/M2 | DIASTOLIC BLOOD PRESSURE: 65 MMHG | OXYGEN SATURATION: 99 % | RESPIRATION RATE: 15 BRPM | WEIGHT: 259 LBS | SYSTOLIC BLOOD PRESSURE: 120 MMHG | HEIGHT: 64 IN

## 2021-07-31 DIAGNOSIS — Z13.0 SCREENING FOR ENDOCRINE, NUTRITIONAL, METABOLIC AND IMMUNITY DISORDER: ICD-10-CM

## 2021-07-31 DIAGNOSIS — R73.09 ELEVATED GLUCOSE: ICD-10-CM

## 2021-07-31 DIAGNOSIS — F41.9 ANXIETY AND DEPRESSION: ICD-10-CM

## 2021-07-31 DIAGNOSIS — Z13.21 SCREENING FOR ENDOCRINE, NUTRITIONAL, METABOLIC AND IMMUNITY DISORDER: ICD-10-CM

## 2021-07-31 DIAGNOSIS — Z00.00 PHYSICAL EXAM, ANNUAL: Primary | ICD-10-CM

## 2021-07-31 DIAGNOSIS — Z13.29 SCREENING FOR ENDOCRINE, NUTRITIONAL, METABOLIC AND IMMUNITY DISORDER: ICD-10-CM

## 2021-07-31 DIAGNOSIS — E28.2 PCOS (POLYCYSTIC OVARIAN SYNDROME): ICD-10-CM

## 2021-07-31 DIAGNOSIS — D35.02 ADRENAL ADENOMA, LEFT: ICD-10-CM

## 2021-07-31 DIAGNOSIS — E55.9 VITAMIN D DEFICIENCY: ICD-10-CM

## 2021-07-31 DIAGNOSIS — Z13.228 SCREENING FOR ENDOCRINE, NUTRITIONAL, METABOLIC AND IMMUNITY DISORDER: ICD-10-CM

## 2021-07-31 DIAGNOSIS — F32.A ANXIETY AND DEPRESSION: ICD-10-CM

## 2021-07-31 DIAGNOSIS — E66.01 SEVERE OBESITY (BMI >= 40) (HCC): ICD-10-CM

## 2021-07-31 PROCEDURE — 99395 PREV VISIT EST AGE 18-39: CPT | Performed by: NURSE PRACTITIONER

## 2021-07-31 RX ORDER — CHORIOGONADOTROPIN ALFA 250 UG/.5ML
INJECTION, SOLUTION SUBCUTANEOUS
COMMUNITY
Start: 2021-06-04 | End: 2022-05-24

## 2021-08-17 RX ORDER — ESCITALOPRAM OXALATE 20 MG/1
20 TABLET ORAL DAILY
Qty: 90 TABLET | Refills: 1 | Status: SHIPPED | OUTPATIENT
Start: 2021-08-17 | End: 2022-03-07 | Stop reason: SDUPTHER

## 2021-08-17 RX ORDER — ESCITALOPRAM OXALATE 10 MG/1
10 TABLET ORAL DAILY
Qty: 90 TABLET | Refills: 1 | Status: CANCELLED | OUTPATIENT
Start: 2021-08-17

## 2021-08-26 DIAGNOSIS — N91.2 AMENORRHEA: Primary | ICD-10-CM

## 2021-09-07 ENCOUNTER — LAB (OUTPATIENT)
Dept: LAB | Facility: HOSPITAL | Age: 31
End: 2021-09-07

## 2021-09-07 DIAGNOSIS — N91.2 AMENORRHEA: ICD-10-CM

## 2021-09-07 LAB
25(OH)D3 SERPL-MCNC: 32.4 NG/ML (ref 30–100)
ALBUMIN SERPL-MCNC: 4.4 G/DL (ref 3.5–5.2)
ALBUMIN/GLOB SERPL: 1.3 G/DL
ALP SERPL-CCNC: 73 U/L (ref 39–117)
ALT SERPL W P-5'-P-CCNC: 25 U/L (ref 1–33)
ANION GAP SERPL CALCULATED.3IONS-SCNC: 9.1 MMOL/L (ref 5–15)
AST SERPL-CCNC: 16 U/L (ref 1–32)
BASOPHILS # BLD AUTO: 0.08 10*3/MM3 (ref 0–0.2)
BASOPHILS NFR BLD AUTO: 0.8 % (ref 0–1.5)
BILIRUB SERPL-MCNC: 0.2 MG/DL (ref 0–1.2)
BUN SERPL-MCNC: 7 MG/DL (ref 6–20)
BUN/CREAT SERPL: 10.9 (ref 7–25)
CALCIUM SPEC-SCNC: 9.8 MG/DL (ref 8.6–10.5)
CHLORIDE SERPL-SCNC: 102 MMOL/L (ref 98–107)
CHOLEST SERPL-MCNC: 171 MG/DL (ref 0–200)
CO2 SERPL-SCNC: 26.9 MMOL/L (ref 22–29)
CREAT SERPL-MCNC: 0.64 MG/DL (ref 0.57–1)
DEPRECATED RDW RBC AUTO: 39.7 FL (ref 37–54)
EOSINOPHIL # BLD AUTO: 0.19 10*3/MM3 (ref 0–0.4)
EOSINOPHIL NFR BLD AUTO: 1.9 % (ref 0.3–6.2)
ERYTHROCYTE [DISTWIDTH] IN BLOOD BY AUTOMATED COUNT: 12.8 % (ref 12.3–15.4)
GFR SERPL CREATININE-BSD FRML MDRD: 109 ML/MIN/1.73
GLOBULIN UR ELPH-MCNC: 3.5 GM/DL
GLUCOSE SERPL-MCNC: 84 MG/DL (ref 65–99)
HBA1C MFR BLD: 5.8 % (ref 4.8–5.6)
HCG INTACT+B SERPL-ACNC: <0.1 MIU/ML
HCT VFR BLD AUTO: 39.9 % (ref 34–46.6)
HDLC SERPL-MCNC: 28 MG/DL (ref 40–60)
HGB BLD-MCNC: 13.2 G/DL (ref 12–15.9)
IMM GRANULOCYTES # BLD AUTO: 0.02 10*3/MM3 (ref 0–0.05)
IMM GRANULOCYTES NFR BLD AUTO: 0.2 % (ref 0–0.5)
LDLC SERPL CALC-MCNC: 97 MG/DL (ref 0–100)
LDLC/HDLC SERPL: 3.2 {RATIO}
LYMPHOCYTES # BLD AUTO: 3.86 10*3/MM3 (ref 0.7–3.1)
LYMPHOCYTES NFR BLD AUTO: 39.3 % (ref 19.6–45.3)
MCH RBC QN AUTO: 27.8 PG (ref 26.6–33)
MCHC RBC AUTO-ENTMCNC: 33.1 G/DL (ref 31.5–35.7)
MCV RBC AUTO: 84 FL (ref 79–97)
MONOCYTES # BLD AUTO: 0.42 10*3/MM3 (ref 0.1–0.9)
MONOCYTES NFR BLD AUTO: 4.3 % (ref 5–12)
NEUTROPHILS NFR BLD AUTO: 5.24 10*3/MM3 (ref 1.7–7)
NEUTROPHILS NFR BLD AUTO: 53.5 % (ref 42.7–76)
NRBC BLD AUTO-RTO: 0 /100 WBC (ref 0–0.2)
PLATELET # BLD AUTO: 406 10*3/MM3 (ref 140–450)
PMV BLD AUTO: 9.8 FL (ref 6–12)
POTASSIUM SERPL-SCNC: 4.3 MMOL/L (ref 3.5–5.2)
PROT SERPL-MCNC: 7.9 G/DL (ref 6–8.5)
RBC # BLD AUTO: 4.75 10*6/MM3 (ref 3.77–5.28)
SODIUM SERPL-SCNC: 138 MMOL/L (ref 136–145)
T4 FREE SERPL-MCNC: 0.93 NG/DL (ref 0.93–1.7)
TRIGL SERPL-MCNC: 267 MG/DL (ref 0–150)
TSH SERPL DL<=0.05 MIU/L-ACNC: 2.73 UIU/ML (ref 0.27–4.2)
VIT B12 BLD-MCNC: 453 PG/ML (ref 211–946)
VLDLC SERPL-MCNC: 46 MG/DL (ref 5–40)
WBC # BLD AUTO: 9.81 10*3/MM3 (ref 3.4–10.8)

## 2021-09-07 PROCEDURE — 82306 VITAMIN D 25 HYDROXY: CPT | Performed by: NURSE PRACTITIONER

## 2021-09-07 PROCEDURE — 84443 ASSAY THYROID STIM HORMONE: CPT | Performed by: NURSE PRACTITIONER

## 2021-09-07 PROCEDURE — 36415 COLL VENOUS BLD VENIPUNCTURE: CPT | Performed by: NURSE PRACTITIONER

## 2021-09-07 PROCEDURE — 85025 COMPLETE CBC W/AUTO DIFF WBC: CPT | Performed by: NURSE PRACTITIONER

## 2021-09-07 PROCEDURE — 80053 COMPREHEN METABOLIC PANEL: CPT | Performed by: NURSE PRACTITIONER

## 2021-09-07 PROCEDURE — 84439 ASSAY OF FREE THYROXINE: CPT | Performed by: NURSE PRACTITIONER

## 2021-09-07 PROCEDURE — 80061 LIPID PANEL: CPT | Performed by: NURSE PRACTITIONER

## 2021-09-07 PROCEDURE — 84702 CHORIONIC GONADOTROPIN TEST: CPT

## 2021-09-07 PROCEDURE — 83036 HEMOGLOBIN GLYCOSYLATED A1C: CPT | Performed by: NURSE PRACTITIONER

## 2021-09-07 PROCEDURE — 82607 VITAMIN B-12: CPT | Performed by: NURSE PRACTITIONER

## 2021-09-21 DIAGNOSIS — R19.7 DIARRHEA, UNSPECIFIED TYPE: ICD-10-CM

## 2021-09-21 DIAGNOSIS — R12 CHRONIC HEARTBURN: Primary | ICD-10-CM

## 2021-09-24 DIAGNOSIS — E28.2 PCOS (POLYCYSTIC OVARIAN SYNDROME): Primary | ICD-10-CM

## 2021-09-28 RX ORDER — VALACYCLOVIR HYDROCHLORIDE 1 G/1
1000 TABLET, FILM COATED ORAL 2 TIMES DAILY
Qty: 20 TABLET | Refills: 2 | Status: SHIPPED | OUTPATIENT
Start: 2021-09-28 | End: 2021-10-18

## 2021-11-05 ENCOUNTER — OFFICE VISIT (OUTPATIENT)
Dept: INTERNAL MEDICINE | Facility: CLINIC | Age: 31
End: 2021-11-05

## 2021-11-05 VITALS
HEART RATE: 95 BPM | BODY MASS INDEX: 45.41 KG/M2 | WEIGHT: 266 LBS | HEIGHT: 64 IN | TEMPERATURE: 97.8 F | DIASTOLIC BLOOD PRESSURE: 73 MMHG | OXYGEN SATURATION: 99 % | RESPIRATION RATE: 16 BRPM | SYSTOLIC BLOOD PRESSURE: 143 MMHG

## 2021-11-05 DIAGNOSIS — R03.0 ELEVATED BLOOD PRESSURE READING: ICD-10-CM

## 2021-11-05 DIAGNOSIS — J06.9 ACUTE URI: ICD-10-CM

## 2021-11-05 DIAGNOSIS — R59.9 LYMPH NODE ENLARGEMENT: ICD-10-CM

## 2021-11-05 DIAGNOSIS — R22.1 SWOLLEN NECK: Primary | ICD-10-CM

## 2021-11-05 PROCEDURE — 99214 OFFICE O/P EST MOD 30 MIN: CPT | Performed by: NURSE PRACTITIONER

## 2021-11-05 RX ORDER — SULFAMETHOXAZOLE AND TRIMETHOPRIM 800; 160 MG/1; MG/1
1 TABLET ORAL 2 TIMES DAILY
Qty: 14 TABLET | Refills: 0 | Status: SHIPPED | OUTPATIENT
Start: 2021-11-05 | End: 2021-11-12

## 2021-11-05 NOTE — PROGRESS NOTES
Chief Complaint / Reason:      Chief Complaint   Patient presents with   • Swollen Glands     left side behind ear and down. x 4 days       Subjective     Swollen Glands  Associated symptoms include congestion, fatigue and swollen glands.     Answers for HPI/ROS submitted by the patient on 11/3/2021  Please describe your symptoms.: Left ear lymph node pain and swelling, left axilla pain and now some breast discomfort  Have you had these symptoms before?: No  How long have you been having these symptoms?: 1-4 days  Please describe any probable cause for these symptoms. : None. Had a mild wheeze a few weeks ago and some nasal congestion.  What is the primary reason for your visit?: Other    Patient does have cat and states that she did get scratched.  No history of cat scratch fever though.  Denies any dental or gum issues.  History taken from: patient    PMH/FH/Social History were reviewed and updated appropriately in the electronic medical record.   Past Medical History:   Diagnosis Date   • Anxiety and depression    • BMI 40.0-44.9, adult (HCC)    • Body piercing     ears   • Elevated cholesterol     Reported LDL slightly elevated but has never been Rx medication   • GERD (gastroesophageal reflux disease)    • Headache    • History of pneumonia    • Nephrolithiasis 06/21/2019    Patient reported ER visits but that she has not require surgical intervention in the past   • PCOS (polycystic ovarian syndrome)    • Pneumonia    • PONV (postoperative nausea and vomiting)    • Urinary tract infection      Past Surgical History:   Procedure Laterality Date   • CYSTOSCOPY      Done in the office setting, no anesthesia   • LAPAROSCOPIC CHOLECYSTECTOMY     • URETEROSCOPY LASER LITHOTRIPSY WITH STENT INSERTION Right 7/9/2019    Procedure: CYSTOSCOPY, URETEROSCOPY LASER LITHOTRIPSY WITH BASKET EXTRACTION OF STONE FRAGMENTS, RETROGRADE PYELOGRAM AND STENT INSERTION;  Surgeon: Michael Valerio MD;  Location: [facility] OR;   Service: Urology   • WISDOM TOOTH EXTRACTION       Social History     Socioeconomic History   • Marital status: Single   • Number of children: 0   • Highest education level: Associate degree: academic program   Tobacco Use   • Smoking status: Former Smoker     Packs/day: 1.00     Years: 6.00     Pack years: 6.00     Types: Cigarettes     Start date: 2/15/2008     Quit date: 2/15/2014     Years since quittin.7   • Smokeless tobacco: Never Used   Substance and Sexual Activity   • Alcohol use: Yes     Alcohol/week: 0.0 standard drinks     Comment: Occasional, no history of abuse   • Drug use: No   • Sexual activity: Yes     Partners: Male     Birth control/protection: OCP     Family History   Problem Relation Age of Onset   • Thyroid disease Mother    • Thyroid cancer Mother    • Kidney cancer Mother    • Hypertension Maternal Grandmother    • Stroke Maternal Grandmother    • Lymphoma Paternal Grandfather 65        NH Lymphoma   • Breast cancer Other    • Stomach cancer Other    • Heart valve disorder Father    • No Known Problems Brother    • Bone cancer Paternal Aunt 50        osteosarcoma?   • Hyperlipidemia Maternal Grandfather    • No Known Problems Paternal Grandmother    • Ovarian cancer Neg Hx    • Uterine cancer Neg Hx    • Colon cancer Neg Hx        Review of Systems:   Review of Systems   Constitutional: Positive for fatigue.   HENT: Positive for congestion, ear pain and swollen glands.    Allergic/Immunologic: Positive for environmental allergies.         All other systems were reviewed and are negative.  Exceptions are noted in the subjective or above.      Objective     Vital Signs  Vitals:    21 1424   BP: 143/73   Pulse: 95   Resp: 16   Temp: 97.8 °F (36.6 °C)   SpO2: 99%       Body mass index is 45.66 kg/m².    Physical Exam  Vitals and nursing note reviewed.   Constitutional:       General: She is not in acute distress.     Appearance: She is well-developed.   HENT:      Head: Normocephalic  and atraumatic.      Right Ear: Ear canal and external ear normal. Tympanic membrane is erythematous and bulging.      Left Ear: Ear canal and external ear normal. Tympanic membrane is erythematous and bulging.      Nose: Mucosal edema present. No rhinorrhea.      Right Sinus: No maxillary sinus tenderness or frontal sinus tenderness.      Left Sinus: No maxillary sinus tenderness or frontal sinus tenderness.      Mouth/Throat:      Mouth: Mucous membranes are dry.      Pharynx: Posterior oropharyngeal erythema present.      Comments: PND    Eyes:      Conjunctiva/sclera: Conjunctivae normal.   Cardiovascular:      Rate and Rhythm: Normal rate and regular rhythm.      Heart sounds: Normal heart sounds.   Pulmonary:      Effort: Pulmonary effort is normal. No respiratory distress.      Breath sounds: Normal breath sounds.   Lymphadenopathy:      Head:      Right side of head: No submental, submandibular or tonsillar adenopathy.      Left side of head: Tonsillar and preauricular adenopathy present. No submental or submandibular adenopathy.      Cervical: Cervical adenopathy present.      Left cervical: Superficial cervical adenopathy present.   Skin:     General: Skin is warm and dry.      Capillary Refill: Capillary refill takes less than 2 seconds.      Findings: No rash.   Neurological:      Mental Status: She is alert and oriented to person, place, and time.   Psychiatric:         Behavior: Behavior normal.         Thought Content: Thought content normal.         Judgment: Judgment normal.              Results Review:    I reviewed the patient's previous clinical results.       Medication Review:     Current Outpatient Medications:   •  busPIRone (BUSPAR) 10 MG tablet, Take 1 tablet by mouth 3 (Three) Times a Day., Disp: 270 tablet, Rfl: 2  •  escitalopram (LEXAPRO) 20 MG tablet, Take 1 tablet by mouth Daily., Disp: 90 tablet, Rfl: 1  •  Gonal-f RFF Rediject 300 UNIT/0.5ML solution, INJECT 150 UNITS SUBCUTANEOUSLY  (UNDER THE SKIN) EVERY DAY, Disp: , Rfl:   •  ibuprofen (ADVIL,MOTRIN) 600 MG tablet, Take 1 tablet by mouth Every 6 (Six) Hours As Needed for Mild Pain ., Disp: 30 tablet, Rfl: 3  •  letrozole (FEMARA) 2.5 MG tablet, Take 3 tablets by mouth Daily on cycle days 3-7., Disp: 15 tablet, Rfl: 5  •  medroxyPROGESTERone (PROVERA) 10 MG tablet, Take 1 tablet by mouth Daily., Disp: 10 tablet, Rfl: 5  •  metFORMIN (Glucophage) 500 MG tablet, Take 1 tablet by mouth 2 (Two) Times a Day With Meals., Disp: 60 tablet, Rfl: 2  •  norgestimate-ethinyl estradiol (Sprintec 28) 0.25-35 MG-MCG per tablet, Take 1 tablet by mouth Daily. Skip placebos, Disp: 28 each, Rfl: 5  •  ondansetron (ZOFRAN) 4 MG tablet, Take 1 tablet by mouth Every 6 Hours As Needed for Nausea or Vomiting., Disp: 20 tablet, Rfl: 5  •  Ovidrel 250 MCG/0.5ML injection, INJECT 1 PREFILLED SYRINGE SUBCUTANEOUSLY AS DIRECTED BY PHYSICIAN FOR A SINGLE DOSE, Disp: , Rfl:   •  Prenatal Vit-Fe Fumarate-FA (PRENATAL VITAMIN PO), Take  by mouth., Disp: , Rfl:   •  fluconazole (DIFLUCAN) 150 MG tablet, Take 1 tablet by mouth Every Other Day as directed, Disp: 3 tablet, Rfl: 3    Diagnoses and all orders for this visit:    Swollen neck  -     sulfamethoxazole-trimethoprim (Bactrim DS) 800-160 MG per tablet; Take 1 tablet by mouth 2 (Two) Times a Day for 7 days.  Discussed worsening signs and symptoms along with differential diagnosis.  Advised patient to take Motrin to decrease swelling and apply warm moist heat  Lymph node enlargement  -     sulfamethoxazole-trimethoprim (Bactrim DS) 800-160 MG per tablet; Take 1 tablet by mouth 2 (Two) Times a Day for 7 days.  discussed differential diagnosis recommend maintaining hydration may need imaging if symptoms do not improve.  Elevated blood pressure reading  Advised patient to close monitor blood pressure at home and contact office if blood pressure remains elevated.  Acute URI  -     sulfamethoxazole-trimethoprim (Bactrim DS)  800-160 MG per tablet; Take 1 tablet by mouth 2 (Two) Times a Day for 7 days.    Advised patient to take allergy medicine as prescribed and recommend coughing and deep breathing.      Return if symptoms worsen or fail to improve.    Nguyen Desir, APRN  11/05/2021

## 2021-11-10 DIAGNOSIS — Z01.84 IMMUNITY TO VARICELLA DETERMINED BY SEROLOGIC TEST: Primary | ICD-10-CM

## 2021-11-11 ENCOUNTER — LAB (OUTPATIENT)
Dept: LAB | Facility: HOSPITAL | Age: 31
End: 2021-11-11

## 2021-11-11 DIAGNOSIS — Z01.84 IMMUNITY TO VARICELLA DETERMINED BY SEROLOGIC TEST: ICD-10-CM

## 2021-11-11 PROCEDURE — 36415 COLL VENOUS BLD VENIPUNCTURE: CPT

## 2021-11-11 PROCEDURE — 86787 VARICELLA-ZOSTER ANTIBODY: CPT

## 2021-11-12 LAB — VZV IGM SER IA-ACNC: <0.91 INDEX (ref 0–0.9)

## 2021-12-01 ENCOUNTER — LAB (OUTPATIENT)
Dept: LAB | Facility: HOSPITAL | Age: 31
End: 2021-12-01

## 2021-12-01 DIAGNOSIS — Z01.84 IMMUNITY TO VARICELLA DETERMINED BY SEROLOGIC TEST: Primary | ICD-10-CM

## 2021-12-01 DIAGNOSIS — E28.2 PCOS (POLYCYSTIC OVARIAN SYNDROME): ICD-10-CM

## 2021-12-01 DIAGNOSIS — Z01.84 IMMUNITY TO VARICELLA DETERMINED BY SEROLOGIC TEST: ICD-10-CM

## 2021-12-01 PROCEDURE — 84402 ASSAY OF FREE TESTOSTERONE: CPT

## 2021-12-01 PROCEDURE — 84403 ASSAY OF TOTAL TESTOSTERONE: CPT

## 2021-12-01 PROCEDURE — 36415 COLL VENOUS BLD VENIPUNCTURE: CPT

## 2021-12-01 PROCEDURE — 86787 VARICELLA-ZOSTER ANTIBODY: CPT

## 2021-12-02 LAB — VZV IGG SER IA-ACNC: 659 INDEX

## 2021-12-03 LAB
TESTOST FREE SERPL-MCNC: 1.4 PG/ML (ref 0–4.2)
TESTOST SERPL-MCNC: 44 NG/DL (ref 13–71)

## 2021-12-10 DIAGNOSIS — L29.9 ITCHING: Primary | ICD-10-CM

## 2021-12-10 RX ORDER — HYDROXYZINE HYDROCHLORIDE 25 MG/1
25 TABLET, FILM COATED ORAL 3 TIMES DAILY PRN
Qty: 15 TABLET | Refills: 0 | Status: SHIPPED | OUTPATIENT
Start: 2021-12-10 | End: 2022-03-29

## 2022-01-04 ENCOUNTER — LAB (OUTPATIENT)
Dept: LAB | Facility: HOSPITAL | Age: 32
End: 2022-01-04

## 2022-01-04 DIAGNOSIS — R30.0 DYSURIA: Primary | ICD-10-CM

## 2022-01-04 LAB
BILIRUB UR QL STRIP: NEGATIVE
CLARITY UR: CLEAR
COLOR UR: YELLOW
GLUCOSE UR STRIP-MCNC: NEGATIVE MG/DL
HGB UR QL STRIP.AUTO: ABNORMAL
KETONES UR QL STRIP: NEGATIVE
LEUKOCYTE ESTERASE UR QL STRIP.AUTO: NEGATIVE
NITRITE UR QL STRIP: NEGATIVE
PH UR STRIP.AUTO: 5.5 [PH] (ref 5–8)
PROT UR QL STRIP: ABNORMAL
SP GR UR STRIP: 1.02 (ref 1–1.03)
UROBILINOGEN UR QL STRIP: ABNORMAL

## 2022-01-04 PROCEDURE — 81003 URINALYSIS AUTO W/O SCOPE: CPT | Performed by: NURSE PRACTITIONER

## 2022-01-10 ENCOUNTER — OFFICE VISIT (OUTPATIENT)
Dept: PSYCHIATRY | Facility: CLINIC | Age: 32
End: 2022-01-10

## 2022-01-10 DIAGNOSIS — F41.1 GENERALIZED ANXIETY DISORDER: Primary | ICD-10-CM

## 2022-01-10 DIAGNOSIS — G47.9 SLEEP DISTURBANCE: ICD-10-CM

## 2022-01-10 DIAGNOSIS — F41.0 PANIC DISORDER: ICD-10-CM

## 2022-01-10 PROCEDURE — 90833 PSYTX W PT W E/M 30 MIN: CPT | Performed by: NURSE PRACTITIONER

## 2022-01-10 PROCEDURE — 99213 OFFICE O/P EST LOW 20 MIN: CPT | Performed by: NURSE PRACTITIONER

## 2022-01-10 RX ORDER — TRAZODONE HYDROCHLORIDE 50 MG/1
50 TABLET ORAL NIGHTLY
Qty: 30 TABLET | Refills: 0 | Status: SHIPPED | OUTPATIENT
Start: 2022-01-10 | End: 2022-05-11

## 2022-01-10 NOTE — PROGRESS NOTES
Subjective   Taylor Rivas is a 31 y.o. female who is here today for medication management follow up. and therapy in person face to face with covid precautions taken, screening, masked, distance and good handwashing.     TIME IN:4p  TIME OUT: 4:51p     Chief Complaint: JON, sleep disturbance     History of Present Illness Patient reports ongoing stressors, school, work, marital conflict and not getting pregnant after two attempts with fertility assistance. she reports anxiety about a 6-8 most days past month. She is tolerating current med management. She reports poor sleep with only getting a few hours a night taking almost 3-4 hours to fall asleep. She states she usually goes to sleep at 9pm since she has to get up early to get to work but has been midnight or later most nights, feels tired, edgy.  Denies adverse effects from medications. Pt reports working on weight loss and health.   (Scales based on 0 - 10 with 10 being the worst)    Therapy:  Start Time: 4p   Stop Time: 430    (30 ) minutes was spent for psychotherapy. Assisted patient in processing patient's anxiety and sleep disturbance. Acknowledged and normalized patient's thoughts, feelings, and concerns. Utilized cognitive behavioral therapy to assist the patient in recognizing more appropriate coping mechanisms when she becomes agitated/anxious/sad which are proven effective in reducing the severity of frequency of symptoms.     CLINICAL MANUEVERING/INTERVENTION:   Patient talked about current stressors, primarily having a difficult time dealing with marriage issues. Venting of frustrations was conducted. Feelings were processed and validated, both negative and positive. Flushing out worries and concerns was conducted in order to diminish emotional tension. Processing conflict was conducted. Ways in which patient may take stress off herself in a purposeful manner was discussed ie education and therapy encouraged. Patient was assisted in 'talking  out' what she may do with stressors as they  continue to be a challenge, keeping in mind the notion that there is typically a solution to any given problem. Utilized motivational interviewing techniques including complex reflections to attempt to assist the patient and focusing on the positive and to maintain and encourage calm outlook. Venting of concerns continued regarding work/school. The patient expressed gratitude for today's session and said that counseling helps her feel better.      The following portions of the patient's history were reviewed and updated as appropriate: allergies, current medications, past family history, past medical history, past social history, past surgical history and problem list.    Review of Systems  A 14 point review of systems was performed and is negative except as noted above.    Objective   Physical Exam  not currently breastfeeding.    No Known Allergies    Current Medications:   Current Outpatient Medications   Medication Sig Dispense Refill   • busPIRone (BUSPAR) 10 MG tablet Take 1 tablet by mouth 3 (Three) Times a Day. 270 tablet 2   • escitalopram (LEXAPRO) 20 MG tablet Take 1 tablet by mouth Daily. 90 tablet 1   • fluconazole (DIFLUCAN) 150 MG tablet Take 1 tablet by mouth Every Other Day as directed 3 tablet 3   • Gonal-f RFF Rediject 300 UNIT/0.5ML solution INJECT 150 UNITS SUBCUTANEOUSLY (UNDER THE SKIN) EVERY DAY     • hydrOXYzine (ATARAX) 25 MG tablet Take 1 tablet by mouth 3 (Three) Times a Day As Needed for Itching. 15 tablet 0   • ibuprofen (ADVIL,MOTRIN) 600 MG tablet Take 1 tablet by mouth Every 6 (Six) Hours As Needed for Mild Pain . 30 tablet 3   • letrozole (FEMARA) 2.5 MG tablet Take 3 tablets by mouth Daily on cycle days 3-7. 15 tablet 5   • medroxyPROGESTERone (PROVERA) 10 MG tablet Take 1 tablet by mouth Daily. 10 tablet 5   • metFORMIN (Glucophage) 500 MG tablet Take 1 tablet by mouth 2 (Two) Times a Day With Meals. 60 tablet 2   • norgestimate-ethinyl  estradiol (Sprintec 28) 0.25-35 MG-MCG per tablet Take 1 tablet by mouth Daily. Skip placebos 28 each 5   • ondansetron (ZOFRAN) 4 MG tablet Take 1 tablet by mouth Every 6 Hours As Needed for Nausea or Vomiting. 20 tablet 5   • Ovidrel 250 MCG/0.5ML injection INJECT 1 PREFILLED SYRINGE SUBCUTANEOUSLY AS DIRECTED BY PHYSICIAN FOR A SINGLE DOSE     • Prenatal Vit-Fe Fumarate-FA (PRENATAL VITAMIN PO) Take  by mouth.     • traZODone (DESYREL) 50 MG tablet Take 1 tablet by mouth Every Night. 30 tablet 0   • triamcinolone (KENALOG) 0.1 % ointment Apply 1 application topically to the appropriate area as directed 2 (Two) Times a Day. 80 g 2     No current facility-administered medications for this visit.       Lab Results:  Orders Only on 01/04/2022   Component Date Value Ref Range Status   • Color, UA 01/04/2022 Yellow  Yellow, Straw Final   • Appearance, UA 01/04/2022 Clear  Clear Final   • pH, UA 01/04/2022 5.5  5.0 - 8.0 Final   • Specific Gravity, UA 01/04/2022 1.019  1.001 - 1.030 Final   • Glucose, UA 01/04/2022 Negative  Negative Final   • Ketones, UA 01/04/2022 Negative  Negative Final   • Bilirubin, UA 01/04/2022 Negative  Negative Final   • Blood, UA 01/04/2022 Trace* Negative Final   • Protein, UA 01/04/2022 100 mg/dL (2+)* Negative Final   • Leuk Esterase, UA 01/04/2022 Negative  Negative Final   • Nitrite, UA 01/04/2022 Negative  Negative Final   • Urobilinogen, UA 01/04/2022 0.2 E.U./dL  0.2 - 1.0 E.U./dL Final   Lab on 12/01/2021   Component Date Value Ref Range Status   • Testosterone, Total 12/01/2021 44  13 - 71 ng/dL Final   • Testosterone, Free 12/01/2021 1.4  0.0 - 4.2 pg/mL Final   • Varicella IgG 12/01/2021 659  Immune >165 index Final                                   Negative          <135                                 Equivocal    135 - 165                                 Positive          >165  A positive result generally indicates exposure to the  pathogen or administration of specific  immunoglobulins,  but it is not indication of active infection or stage  of disease.   Lab on 11/11/2021   Component Date Value Ref Range Status   • Varicella IgM 11/11/2021 <0.91  0.00 - 0.90 index Final                                   Negative          <0.91                                 Borderline  0.91 - 1.09                                 Positive          >1.09   Lab on 09/07/2021   Component Date Value Ref Range Status   • HCG Quantitative 09/07/2021 <0.10  mIU/mL Final   Office Visit on 07/31/2021   Component Date Value Ref Range Status   • Glucose 09/07/2021 84  65 - 99 mg/dL Final   • BUN 09/07/2021 7  6 - 20 mg/dL Final   • Creatinine 09/07/2021 0.64  0.57 - 1.00 mg/dL Final   • Sodium 09/07/2021 138  136 - 145 mmol/L Final   • Potassium 09/07/2021 4.3  3.5 - 5.2 mmol/L Final   • Chloride 09/07/2021 102  98 - 107 mmol/L Final   • CO2 09/07/2021 26.9  22.0 - 29.0 mmol/L Final   • Calcium 09/07/2021 9.8  8.6 - 10.5 mg/dL Final   • Total Protein 09/07/2021 7.9  6.0 - 8.5 g/dL Final   • Albumin 09/07/2021 4.40  3.50 - 5.20 g/dL Final   • ALT (SGPT) 09/07/2021 25  1 - 33 U/L Final   • AST (SGOT) 09/07/2021 16  1 - 32 U/L Final   • Alkaline Phosphatase 09/07/2021 73  39 - 117 U/L Final   • Total Bilirubin 09/07/2021 0.2  0.0 - 1.2 mg/dL Final   • eGFR Non  Amer 09/07/2021 109  >60 mL/min/1.73 Final   • Globulin 09/07/2021 3.5  gm/dL Final   • A/G Ratio 09/07/2021 1.3  g/dL Final   • BUN/Creatinine Ratio 09/07/2021 10.9  7.0 - 25.0 Final   • Anion Gap 09/07/2021 9.1  5.0 - 15.0 mmol/L Final   • Hemoglobin A1C 09/07/2021 5.80* 4.80 - 5.60 % Final   • Vitamin B-12 09/07/2021 453  211 - 946 pg/mL Final   • 25 Hydroxy, Vitamin D 09/07/2021 32.4  30.0 - 100.0 ng/ml Final   • TSH 09/07/2021 2.730  0.270 - 4.200 uIU/mL Final   • Free T4 09/07/2021 0.93  0.93 - 1.70 ng/dL Final   • Total Cholesterol 09/07/2021 171  0 - 200 mg/dL Final   • Triglycerides 09/07/2021 267* 0 - 150 mg/dL Final   • HDL Cholesterol  09/07/2021 28* 40 - 60 mg/dL Final   • LDL Cholesterol  09/07/2021 97  0 - 100 mg/dL Final   • VLDL Cholesterol 09/07/2021 46* 5 - 40 mg/dL Final   • LDL/HDL Ratio 09/07/2021 3.20   Final   • WBC 09/07/2021 9.81  3.40 - 10.80 10*3/mm3 Final   • RBC 09/07/2021 4.75  3.77 - 5.28 10*6/mm3 Final   • Hemoglobin 09/07/2021 13.2  12.0 - 15.9 g/dL Final   • Hematocrit 09/07/2021 39.9  34.0 - 46.6 % Final   • MCV 09/07/2021 84.0  79.0 - 97.0 fL Final   • MCH 09/07/2021 27.8  26.6 - 33.0 pg Final   • MCHC 09/07/2021 33.1  31.5 - 35.7 g/dL Final   • RDW 09/07/2021 12.8  12.3 - 15.4 % Final   • RDW-SD 09/07/2021 39.7  37.0 - 54.0 fl Final   • MPV 09/07/2021 9.8  6.0 - 12.0 fL Final   • Platelets 09/07/2021 406  140 - 450 10*3/mm3 Final   • Neutrophil % 09/07/2021 53.5  42.7 - 76.0 % Final   • Lymphocyte % 09/07/2021 39.3  19.6 - 45.3 % Final   • Monocyte % 09/07/2021 4.3* 5.0 - 12.0 % Final   • Eosinophil % 09/07/2021 1.9  0.3 - 6.2 % Final   • Basophil % 09/07/2021 0.8  0.0 - 1.5 % Final   • Immature Grans % 09/07/2021 0.2  0.0 - 0.5 % Final   • Neutrophils, Absolute 09/07/2021 5.24  1.70 - 7.00 10*3/mm3 Final   • Lymphocytes, Absolute 09/07/2021 3.86* 0.70 - 3.10 10*3/mm3 Final   • Monocytes, Absolute 09/07/2021 0.42  0.10 - 0.90 10*3/mm3 Final   • Eosinophils, Absolute 09/07/2021 0.19  0.00 - 0.40 10*3/mm3 Final   • Basophils, Absolute 09/07/2021 0.08  0.00 - 0.20 10*3/mm3 Final   • Immature Grans, Absolute 09/07/2021 0.02  0.00 - 0.05 10*3/mm3 Final   • nRBC 09/07/2021 0.0  0.0 - 0.2 /100 WBC Final       Appearance: WNL  Hygiene: good  Cooperation:  Cooperative  Eye Contact:  direct  Psychomotor Behavior:  denies psychomotor agitation/retardation, No EPS, No motor tics  Mood:  Stressed, anxious   Affect:  Congruent   Hopelessness: Denies  Speech:  Normal  Thought Process:  Linear  Thought Content:  Normal  Concentration: Normal   Suicidal: denies  Homicidal:  None  Hallucinations:  None  Delusion:  None  Memory:   Intact  Orientation:  Person, Place, Time and Situation  Reliability:  good  Insight:  good  Judgement: good  Impulse Control: good  Estimated Intelligence: average range    LIZETTE REVIEWED NO RED FLAGS    Assessment/Plan   Diagnoses and all orders for this visit:    1. Generalized anxiety disorder (Primary)    2. Panic disorder    3. Sleep disturbance    Other orders  -     traZODone (DESYREL) 50 MG tablet; Take 1 tablet by mouth Every Night.  Dispense: 30 tablet; Refill: 0          IMPRESSION: increased stressors with increase in anxiety and now sleep disturbance     PLAN:  Add trazodone 50 mg at hs for sleeplessness   Cont Buspar 10 mg bid and may take one in middle of day as needed for anxiety   Cont esitalopram 20 mg daily for anxiety     We discussed risks, benefits, and side effects of the above medications and the patient was agreeable with the plan. Patient was educated on the importance of compliance with treatment and follow-up appointments.     Provide Cognitive Behavioral Therapy and Solution Focused Therapy to improve functioning, maintain stability, and avoid decompensation and the need for higher level of care.    Counseled patient regarding multimodal approach with encouragement of healthy nutrition, healthy sleep, regular physical mobility, social involvement, counseling, and medication compliance.     Assisted patient in identifying risk factors which would indicate the need for higher level of care including thoughts to harm self or others and/or self-harming behavior and encouraged patient to contact this office, call 911, or present to the nearest emergency room should any of these events occur. Discussed crisis intervention services and means to access.  Patient adamantly and convincingly denies current suicidal or homicidal ideation or perceptual disturbance.    Treatment Plan: stabilize mood, patient will stay out of psychiatric hospital and be at optimal level of functioning with therapy  and take all medication as prescribed. Patient verbalized  understanding and agreement to plan.    Instructed to call for questions or concerns and return early if necessary.     Greater than 50% time was spent in coordination of care, and counseling the patient regarding current assessment, symptoms, plan of care going forward, supportive therapy.  Answered any questions patient had regarding medications and plan of care.    Return in about 3 weeks (around 1/31/2022). check in

## 2022-01-15 ENCOUNTER — LAB (OUTPATIENT)
Dept: LAB | Facility: HOSPITAL | Age: 32
End: 2022-01-15

## 2022-01-15 DIAGNOSIS — R50.9 FEVER, UNSPECIFIED FEVER CAUSE: Primary | ICD-10-CM

## 2022-01-15 DIAGNOSIS — R50.9 FEVER, UNSPECIFIED FEVER CAUSE: ICD-10-CM

## 2022-01-15 PROCEDURE — U0004 COV-19 TEST NON-CDC HGH THRU: HCPCS

## 2022-01-16 LAB — SARS-COV-2 RNA PNL SPEC NAA+PROBE: DETECTED

## 2022-02-01 RX ORDER — METRONIDAZOLE 500 MG/1
500 TABLET ORAL 2 TIMES DAILY
Qty: 14 TABLET | Refills: 0 | Status: SHIPPED | OUTPATIENT
Start: 2022-02-01 | End: 2022-02-08

## 2022-03-07 RX ORDER — ESCITALOPRAM OXALATE 20 MG/1
20 TABLET ORAL DAILY
Qty: 90 TABLET | Refills: 1 | Status: CANCELLED | OUTPATIENT
Start: 2022-03-07

## 2022-03-07 RX ORDER — ESCITALOPRAM OXALATE 20 MG/1
20 TABLET ORAL DAILY
Qty: 90 TABLET | Refills: 3 | Status: SHIPPED | OUTPATIENT
Start: 2022-03-07 | End: 2023-03-21 | Stop reason: SDUPTHER

## 2022-03-16 DIAGNOSIS — N92.6 MISSED PERIOD: Primary | ICD-10-CM

## 2022-03-29 ENCOUNTER — OFFICE VISIT (OUTPATIENT)
Dept: BARIATRICS/WEIGHT MGMT | Facility: CLINIC | Age: 32
End: 2022-03-29

## 2022-03-29 ENCOUNTER — OFFICE VISIT (OUTPATIENT)
Dept: BEHAVIORAL HEALTH | Facility: CLINIC | Age: 32
End: 2022-03-29

## 2022-03-29 ENCOUNTER — TELEMEDICINE (OUTPATIENT)
Dept: FAMILY MEDICINE CLINIC | Facility: TELEHEALTH | Age: 32
End: 2022-03-29

## 2022-03-29 VITALS
WEIGHT: 252 LBS | DIASTOLIC BLOOD PRESSURE: 72 MMHG | SYSTOLIC BLOOD PRESSURE: 130 MMHG | OXYGEN SATURATION: 98 % | TEMPERATURE: 97.4 F | HEART RATE: 72 BPM | RESPIRATION RATE: 18 BRPM | HEIGHT: 64 IN | BODY MASS INDEX: 43.02 KG/M2

## 2022-03-29 DIAGNOSIS — R76.8 HELICOBACTER PYLORI ANTIBODY POSITIVE: ICD-10-CM

## 2022-03-29 DIAGNOSIS — F41.9 MILD ANXIETY: ICD-10-CM

## 2022-03-29 DIAGNOSIS — R53.83 FATIGUE, UNSPECIFIED TYPE: ICD-10-CM

## 2022-03-29 DIAGNOSIS — E28.2 PCOS (POLYCYSTIC OVARIAN SYNDROME): ICD-10-CM

## 2022-03-29 DIAGNOSIS — E66.01 OBESITY, CLASS III, BMI 40-49.9 (MORBID OBESITY): Primary | ICD-10-CM

## 2022-03-29 DIAGNOSIS — R12 HEARTBURN: ICD-10-CM

## 2022-03-29 DIAGNOSIS — E66.01 MORBID OBESITY: Primary | ICD-10-CM

## 2022-03-29 DIAGNOSIS — R39.89 SUSPECTED UTI: Primary | ICD-10-CM

## 2022-03-29 DIAGNOSIS — Z71.89 ENCOUNTER FOR PSYCHOLOGICAL ASSESSMENT PRIOR TO BARIATRIC SURGERY: ICD-10-CM

## 2022-03-29 DIAGNOSIS — Z87.891 FORMER SMOKER: ICD-10-CM

## 2022-03-29 PROBLEM — Z98.890 PONV (POSTOPERATIVE NAUSEA AND VOMITING): Status: ACTIVE | Noted: 2022-03-29

## 2022-03-29 PROBLEM — E78.00 ELEVATED CHOLESTEROL: Status: ACTIVE | Noted: 2022-03-29

## 2022-03-29 PROBLEM — K21.9 GERD (GASTROESOPHAGEAL REFLUX DISEASE): Status: ACTIVE | Noted: 2022-03-29

## 2022-03-29 PROBLEM — R11.2 PONV (POSTOPERATIVE NAUSEA AND VOMITING): Status: ACTIVE | Noted: 2022-03-29

## 2022-03-29 PROBLEM — M51.26 LUMBAR HERNIATED DISC: Status: ACTIVE | Noted: 2022-03-29

## 2022-03-29 PROCEDURE — 90791 PSYCH DIAGNOSTIC EVALUATION: CPT | Performed by: PSYCHOLOGIST

## 2022-03-29 PROCEDURE — 99213 OFFICE O/P EST LOW 20 MIN: CPT | Performed by: NURSE PRACTITIONER

## 2022-03-29 PROCEDURE — 99204 OFFICE O/P NEW MOD 45 MIN: CPT | Performed by: PHYSICIAN ASSISTANT

## 2022-03-29 RX ORDER — NITROFURANTOIN 25; 75 MG/1; MG/1
100 CAPSULE ORAL 2 TIMES DAILY
Qty: 14 CAPSULE | Refills: 0 | Status: SHIPPED | OUTPATIENT
Start: 2022-03-29 | End: 2022-05-11

## 2022-03-29 RX ORDER — PHENAZOPYRIDINE HYDROCHLORIDE 200 MG/1
200 TABLET, FILM COATED ORAL 3 TIMES DAILY PRN
Qty: 6 TABLET | Refills: 0 | Status: SHIPPED | OUTPATIENT
Start: 2022-03-29 | End: 2022-05-11

## 2022-03-29 NOTE — PROGRESS NOTES
PROGRESS NOTE    Data:    Taylor Rivas is a 31 y.o. female who met with the undersigned for a scheduled individual outpatient therapy session from 12:45 - 1:30pm.      Clinical Maneuvering/Intervention:      Chief complaint and history of presenting illness/Problems: struggling with obesity for several years. Despite trying different weight loss plans and diets, the pt reported being unsuccessful in losing weight. A psychological evaluation was conducted in order to assess past and current level of functioning. Areas assessed included, but were not limited to: perception of social support, perception of ability to face and deal with challenges in life (positive functioning), anxiety symptoms, depressive symptoms, perspective on beliefs/belief system, coping skills for stress, intelligence level, addiction issues, etc. Therapeutic rapport was established. Interventions conducted today were geared towards assessing the pt's readiness for weight loss surgery and identifying and psychological contraindications for undergoing such a major life change. Social support was deemed strong (specific to weight loss surgery/weight loss in this manner and in a general sense): , mother, father, friend, co-workers. Current psychological struggles were described as low, but included mild anxiety. She takes medication for it that she described as quite helpful. Coping skills for distress and related to undergoing a major life change such as weight loss surgery/weight loss were deemed strong and included good sense of humor, follows directions well, intelligent, knows what she wants in life (weight loss surgery, nursing degree, start a family), responsible person, maintains quality relationships with others, and believes in herself that she will be successful with weight loss surgery. The pt endorsed having characteristics of readiness to undergo major life changes inherent in the journey of weight loss surgery. She could  speak to having 'suffered enough,' and the decision to have weight loss surgery has 'clicked' for her. The pt expressed gratitude for today's visit.     Past Family and Social History:      History of family mental health problems: brother (anxiety and depression)    Psychosocial history: treatment of psychiatric care in the past (several sessions for counseling combined with medication management; currently does follow-up visits every few months), alcohol/substance abuse treatment in the past (N/A) , alcohol/substance abuse problems (N/A), inpatient psychiatric care (N/A).    Mental Status Exam (MSE):  Hygiene:  good  Dress: normal  Attitude:  cooperative and proactive  Motor Activity: normal  Speech: normal  Mood:   excited  Affect:  congruent  Thought Processes: normal  Thought Content:  normal  Suicidal Thoughts:  not endorsed  Homicidal Thoughts:  not endorsed  Crisis Safety Plan: not needed   Hallucinations:  none      Patient's Support Network Includes:  family, friends      Progress toward goal: there is evidence to suggest that she is taking measures to improve the quality of her life including seeking weight loss surgery.       Functional Status: moderate to high      Prognosis: good with weight loss surgery    Evaluation, Diagnoses, and Ability/Capacity to Respond to Treatment:      The pt presented to be struggling with anxiety of a mild level and obesity (BMI = 43.2, morbid obesity). Results of MSE demonstrated a functional status of moderate to high. Strengths: belief in self that she will be successful with weight loss surgery, etc (see detailed list of coping skills above). Needed for growth (CPT code requirement for Weaknesses): weight loss.      From a psychological standpoint, the pt presents as a good candidate for bariatric surgery. She is motivated for the surgery, has showed readiness for the lifestyle change in terms of starting to adjust her eating habits, and seems to have appropriate  expectations of how to prepare and how to live after surgery in order to lose weight successfully.    Treatment Plan:      Short term goals: Start improving her health by following up with her bariatric surgeon in order to receive weight loss surgery as soon as feasible/appropriate and demonstrate success with compliance to adhering to the recommended diet. Long term goals: reach a healthy weight and remittance of anxiety via taking control over her health.    Kerline Hooper, PhD, LP

## 2022-03-29 NOTE — PROGRESS NOTES
You have chosen to receive care through a telehealth visit.  Do you consent to use a video/audio connection for your medical care today? Yes     CHIEF COMPLAINT  Chief Complaint   Patient presents with   • Difficulty Urinating   • Urinary Frequency         HPI  Taylor Rivas is a 31 y.o. female  presents with complaint of 1 day h/o urinary frequency, dysuria, strong urinary odor and mild low suprapubic tenderness. She has noticed no vaginal discharge but reports her urine is dark and orange in color. She is not using any AZO or OTC medications for relief. Her last UTI was approximately 1 year ago. She reports no flank pain, back pain or fever.     Review of Systems   Constitutional: Negative.    HENT: Negative.    Respiratory: Negative.    Gastrointestinal: Positive for abdominal pain (suprapubic tenderness).   Genitourinary: Positive for decreased urine volume, difficulty urinating, dysuria, frequency, pelvic pain and urgency. Negative for flank pain, hematuria and vaginal discharge.   Musculoskeletal: Negative for back pain.   Psychiatric/Behavioral: Negative.        Past Medical History:   Diagnosis Date   • Anxiety and depression    • BMI 40.0-44.9, adult (HCC)    • Body piercing     ears   • Elevated cholesterol     Reported LDL slightly elevated but has never been Rx medication   • GERD (gastroesophageal reflux disease)    • Headache    • History of pneumonia    • Nephrolithiasis 06/21/2019    Patient reported ER visits but that she has not require surgical intervention in the past   • PCOS (polycystic ovarian syndrome)    • Pneumonia    • PONV (postoperative nausea and vomiting)    • Urinary tract infection        Family History   Problem Relation Age of Onset   • Thyroid disease Mother    • Thyroid cancer Mother    • Kidney cancer Mother    • Hypertension Maternal Grandmother    • Stroke Maternal Grandmother    • Lymphoma Paternal Grandfather 65        [facility] Lymphoma   • Breast cancer Other    • Stomach  cancer Other    • Heart valve disorder Father    • No Known Problems Brother    • Bone cancer Paternal Aunt 50        osteosarcoma?   • Hyperlipidemia Maternal Grandfather    • No Known Problems Paternal Grandmother    • Ovarian cancer Neg Hx    • Uterine cancer Neg Hx    • Colon cancer Neg Hx        Social History     Socioeconomic History   • Marital status: Single   • Number of children: 0   • Highest education level: Associate degree: academic program   Tobacco Use   • Smoking status: Former Smoker     Packs/day: 1.00     Years: 6.00     Pack years: 6.00     Types: Cigarettes     Start date: 2/15/2008     Quit date: 2/15/2014     Years since quittin.1   • Smokeless tobacco: Never Used   Substance and Sexual Activity   • Alcohol use: Yes     Alcohol/week: 0.0 standard drinks     Comment: Occasional, no history of abuse   • Drug use: No   • Sexual activity: Yes     Partners: Male     Birth control/protection: OCP       Taylor Rivas  reports that she quit smoking about 8 years ago. Her smoking use included cigarettes. She started smoking about 14 years ago. She has a 6.00 pack-year smoking history. She has never used smokeless tobacco..      There were no vitals taken for this visit.    PHYSICAL EXAM  Physical Exam   Constitutional: She is oriented to person, place, and time. She appears well-developed and well-nourished. She does not have a sickly appearance. She does not appear ill. No distress.   HENT:   Head: Normocephalic and atraumatic.   Pulmonary/Chest: Effort normal.  No respiratory distress.  Abdominal: She exhibits no distension. There is abdominal tenderness in the suprapubic area. There is no CVA tenderness.   Suprapubic tenderness per patient report   Neurological: She is alert and oriented to person, place, and time.   Psychiatric: She has a normal mood and affect.   Vitals reviewed.      Results for orders placed or performed in visit on 01/15/22   COVID-19, [facility] IN-[facility]  NP/OP SWAB IN UTM/VTM/[facility] 24HR TAT - Swab, Nasal Cavity    Specimen: Nasal Cavity; Swab   Result Value Ref Range    COVID19 Detected (C) Not Detected - Ref. Range       Diagnoses and all orders for this visit:    1. Suspected UTI (Primary)  -     nitrofurantoin, macrocrystal-monohydrate, (Macrobid) 100 MG capsule; Take 1 capsule by mouth 2 (Two) Times a Day.  Dispense: 14 capsule; Refill: 0  -     phenazopyridine (Pyridium) 200 MG tablet; Take 1 tablet by mouth 3 (Three) Times a Day As Needed for Bladder Spasms.  Dispense: 6 tablet; Refill: 0    -Macrobid as prescribed - complete entire course of medication even if you begin to feel better.   --Phenazopyridine is for painful urination and bladder spasms--this medication with cause urine to become bright orange and can stain undergarments.    -Continue to increase your fluid intake.   -Abstain from intercourse during antibiotic treatment.   -Practice good perineal hygiene: wipe front to back  -Do not hold your urine- go to the bathroom every 2-3 hours.     -Warning signs: severe abdominal/pelvic/back pain, fever >101, blood in urine - seek medical attention as soon as possible for a hands on/objective exam and possible labs.     -Follow up with your PCP in 2 days if no improvement in symptoms or if symptoms begin to worsen.       FOLLOW-UP  As discussed during visit with PCP/[facility] if no improvement or Urgent Care/Emergency Department if worsening of symptoms    Patient verbalizes understanding of medication dosage, comfort measures, instructions for treatment and follow-up.    Ivania Nolen, APRN  03/29/2022  08:59 EDT    This visit was performed via Telehealth.  This patient has been instructed to follow-up with their primary care provider if their symptoms worsen or the treatment provided does not resolve their illness.

## 2022-03-29 NOTE — PROGRESS NOTES
[facility] GROUP BARIATRIC SURGERY  [address]  [address]  [facility] [address]  [phone]      Patient  Name:  Taylor Rivas  :  1990      Date of Visit: 2022      Chief Complaint:  weight gain; unable to maintain weight loss      History of Present Illness:  Taylor Rivas is a 31 y.o. female who presents today for evaluation, education and consultation regarding metabolic and bariatric surgery with Dr. Stein.     Taylor has been overweight for her whole life, has been 35 pounds or more overweight for at least 20 years, has been 100 pounds or more overweight for 10 or more years and started dieting at age 12.  Previous diet attempts include: High Protein, Low Carbohydrate, Fernandez's Diet, [place], Cabbage Soup, Fasting and Slim Fast; Weight Watchers; Wellbutrin, Amphetamines, Prozac.  The most weight Taylor lost was 30 pounds through diet/exercise, but was unable to maintain that weight loss.  Her maximum lifetime weight is 265 pounds.      GI: History of lap sandhya in 2016 secondary to gallstones.  No complications.  She has a longstanding history of intermittent heartburn that she treats with as needed Tums.  She has had a positive H. pylori antibody test in the past and was treated with a Prevpac.  She was never retested and has never had an EGD.  She denies any nausea, vomiting, abdominal pain, diarrhea, constipation.    Other past medical history includes PCOS on Metformin.  She has had significant issues with infertility and has been through multiple treatments without success.  She also has chronic low back pain secondary to herniated disc at L5-S1.  She is a former smoker x6 years and quit about 8 years ago.  She does not have any exposure to nicotine at home.     Complete history has been obtained and discussed today, as pertinent to metabolic/ bariatric surgery.     Past Medical History:   Diagnosis Date   • Anxiety and depression    • BMI  40.0-44.9, adult ([facility])    • Body piercing     ears   • GERD (gastroesophageal reflux disease)    • Headache    • Heartburn     will take antacid; happens weekly. No EGD, has had + h pylori antibody test   • Helicobacter pylori antibody positive 2018    treated with prevpac   • History of pneumonia    • Lumbar herniated disc     L5; PRN Nsaids   • Nephrolithiasis 06/21/2019    Patient reported ER visits but that she has not require surgical intervention in the past   • PCOS (polycystic ovarian syndrome)    • Pneumonia    • PONV (postoperative nausea and vomiting)    • S/P laparoscopic cholecystectomy 2016    gallstones   • Urinary tract infection      Past Surgical History:   Procedure Laterality Date   • CYSTOSCOPY      Done in the office setting, no anesthesia   • LAPAROSCOPIC CHOLECYSTECTOMY  2016    gallstones; no complications   • URETEROSCOPY LASER LITHOTRIPSY WITH STENT INSERTION Right 07/09/2019    Procedure: CYSTOSCOPY, URETEROSCOPY LASER LITHOTRIPSY WITH BASKET EXTRACTION OF STONE FRAGMENTS, RETROGRADE PYELOGRAM AND STENT INSERTION;  Surgeon: Michael Valerio MD;  Location: [facility];  Service: Urology   • WISDOM TOOTH EXTRACTION         No Known Allergies    Current Outpatient Medications:   •  busPIRone (BUSPAR) 10 MG tablet, Take 1 tablet by mouth 3 (Three) Times a Day., Disp: 270 tablet, Rfl: 2  •  escitalopram (LEXAPRO) 20 MG tablet, Take 1 tablet by mouth Daily., Disp: 90 tablet, Rfl: 3  •  fluconazole (DIFLUCAN) 150 MG tablet, Take 1 tablet by mouth Every Other Day as directed, Disp: 3 tablet, Rfl: 3  •  ibuprofen (ADVIL,MOTRIN) 600 MG tablet, Take 1 tablet by mouth Every 6 (Six) Hours As Needed for Mild Pain ., Disp: 30 tablet, Rfl: 3  •  metFORMIN (Glucophage) 500 MG tablet, Take 1 tablet by mouth 2 (Two) Times a Day With Meals., Disp: 60 tablet, Rfl: 2  •  nitrofurantoin, macrocrystal-monohydrate, (Macrobid) 100 MG capsule, Take 1 capsule by mouth 2 (Two) Times a Day., Disp: 14 capsule, Rfl:  0  •  norgestimate-ethinyl estradiol (Sprintec 28) 0.25-35 MG-MCG per tablet, Take 1 tablet by mouth Daily. Skip placebos, Disp: 28 each, Rfl: 5  •  ondansetron (ZOFRAN) 4 MG tablet, Take 1 tablet by mouth Every 6 Hours As Needed for Nausea or Vomiting., Disp: 20 tablet, Rfl: 5  •  phenazopyridine (Pyridium) 200 MG tablet, Take 1 tablet by mouth 3 (Three) Times a Day As Needed for Bladder Spasms., Disp: 6 tablet, Rfl: 0  •  Prenatal Vit-Fe Fumarate-FA (PRENATAL VITAMIN PO), Take  by mouth., Disp: , Rfl:   •  Gonal-f RFF Rediject 300 UNIT/0.5ML solution, INJECT 150 UNITS SUBCUTANEOUSLY (UNDER THE SKIN) EVERY DAY, Disp: , Rfl:   •  letrozole (FEMARA) 2.5 MG tablet, Take 3 tablets by mouth Daily on cycle days 3-7., Disp: 15 tablet, Rfl: 5  •  medroxyPROGESTERone (PROVERA) 10 MG tablet, Take 1 tablet by mouth Daily., Disp: 10 tablet, Rfl: 5  •  Ovidrel 250 MCG/0.5ML injection, INJECT 1 PREFILLED SYRINGE SUBCUTANEOUSLY AS DIRECTED BY PHYSICIAN FOR A SINGLE DOSE, Disp: , Rfl:   •  traZODone (DESYREL) 50 MG tablet, Take 1 tablet by mouth Every Night., Disp: 30 tablet, Rfl: 0  •  triamcinolone (KENALOG) 0.1 % ointment, Apply 1 application topically to the appropriate area as directed 2 (Two) Times a Day., Disp: 80 g, Rfl: 2    Social History     Socioeconomic History   • Marital status: Single   • Number of children: 0   • Highest education level: Associate degree: academic program   Tobacco Use   • Smoking status: Former Smoker     Packs/day: 1.00     Years: 6.00     Pack years: 6.00     Types: Cigarettes     Start date: 2/15/2008     Quit date: 2/15/2014     Years since quittin.1   • Smokeless tobacco: Never Used   Vaping Use   • Vaping Use: Former   Substance and Sexual Activity   • Alcohol use: Yes     Alcohol/week: 0.0 standard drinks     Comment: Occasional, no history of abuse   • Drug use: No   • Sexual activity: Yes     Partners: Male     Birth control/protection: OCP     Social History     Social History  Narrative    Lives in [place], [place]. 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 .        Family History   Problem Relation Age of Onset   • Thyroid disease Mother    • Thyroid cancer Mother    • Kidney cancer Mother    • Hypertension Maternal Grandmother    • Stroke Maternal Grandmother    • Lymphoma Paternal Grandfather 65        [facility] Lymphoma   • Breast cancer Other    • Stomach cancer Other    • Heart valve disorder Father    • No Known Problems Brother    • Bone cancer Paternal Aunt 50        osteosarcoma?   • Hyperlipidemia Maternal Grandfather    • No Known Problems Paternal Grandmother    • Ovarian cancer Neg Hx    • Uterine cancer Neg Hx    • Colon cancer Neg Hx        Review of Systems:  Constitutional:  reports fatigue, weight gain and denies fevers, chills.  HEENT:  denies headache, ear pain or loss of hearing, blurred or double vision, nasal discharge or sore throat.  Cardiovascular:  reports none and denies HTN, HLD, CAD, Atrial Fib, hx heart disease, heart murmur, hx MI, chest pain, palpitations, edema, hx DVT.  Respiratory:  reports none and denies dyspnea on exertion, shortness of breath , cough , wheezing, sleep apnea, asthma, COPD, hx PE.  Gastrointestinal:  reports heartburn, diarrhea, gallbladder issues and denies dysphagia, nausea, vomiting, abdominal pain, IBS, constipation, melena, blood in stool, liver disease, hx pancreatitis.  Genitourinary:  reports none and denies history of  frequent UTI, incontinence, hematuria, dysuria, polyuria, polydipsia, renal insufficiency, renal failure.    Musculoskeletal:  reports back pain and denies joint pain, fibromyalgia, arthritis and autoimmune disease.  Neurological:  reports none and denies headaches, migraines, numbness /tingling, dizziness, confusion, seizure, stroke.  Psychiatric:  reports hx depression, hx anxiety and denies depressed mood, feeling anxious, bipolar disorder, suicidal ideation, hx suicide attempt, hx self injury, hx substance abuse,  eating disorder.  Endocrine:  reports PCOS and denies endometriosis, glucose intolerance, diabetes, thyroid disease, gout.  Hematologic:  reports none and denies bruising, bleeding disorder, hx anemia, hx blood transfusion.  Skin:  reports none and denies rashes, hx MRSA.      COVID-19 Questionnaire:    1.  Have you previously been tested for COVID-19?    []  No  [x]  Yes  2.  Were you ever positive for COVID-19?    []  No  [x]  Yes  3.  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    []  No  [x]  Yes  4.  Are you symptomatic for COVID-19 as defined by the CDC (fever, cough)?  If so, when did symptoms begin?    [x]  No  []  Yes, symptoms began **  5.  Have you been hospitalized for COVID-19?  If so, were you in the ICU?  [x]  No  []  Yes, but not in the ICU  []  Yes, and I was in the ICU  6.  Are you a resident in a congregate (group care setting?)    [x]  No  []  Yes  7.  Are you pregnant?  [x]  No  []  Yes      Physical Exam:  Vital Signs:  Weight: 114 kg (252 lb)   Body mass index is 43.26 kg/m².  Temp: 97.4 °F (36.3 °C)   Heart Rate: 72   BP: 130/72     Physical Exam  Constitutional:       Appearance: She is obese.   HENT:      Head: Normocephalic and atraumatic.   Eyes:      Extraocular Movements: Extraocular movements intact.      Conjunctiva/sclera: Conjunctivae normal.   Cardiovascular:      Rate and Rhythm: Normal rate and regular rhythm.   Pulmonary:      Effort: Pulmonary effort is normal.      Breath sounds: Normal breath sounds.   Abdominal:      General: Bowel sounds are normal. There is no distension.      Palpations: Abdomen is soft. There is no mass.      Tenderness: There is no abdominal tenderness.      Comments: Old lap scars   Musculoskeletal:         General: Normal range of motion.      Cervical back: Normal range of motion and neck supple.   Skin:     General: Skin is warm and dry.   Neurological:      General: No focal deficit present.      Mental Status: She is alert and oriented to person,  place, and time.   Psychiatric:         Mood and Affect: Mood normal.         Behavior: Behavior normal.         Thought Content: Thought content normal.         Judgment: Judgment normal.         Patient Active Problem List   Diagnosis   • PCOS (polycystic ovarian syndrome)   • HNP (herniated nucleus pulposus), lumbar L5-S1   • Nephrolithiasis   • Well woman exam   • Anxiety and depression   • BMI 40.0-44.9, adult (HCC)   • Heartburn   • Helicobacter pylori antibody positive   • GERD (gastroesophageal reflux disease)   • Elevated cholesterol   • S/P laparoscopic cholecystectomy   • PONV (postoperative nausea and vomiting)   • Lumbar herniated disc       Assessment:  31 y.o. female with medically complicated obesity pursuing sleeve gastrectomy.    Metabolic & Bariatric Surgery is deemed medically necessary given the following: Patient's Body mass index is 43.26 kg/m². indicating that she is morbidly obese (BMI > 40 or > 35 with obesity - related health condition). Obesity-related health conditions include the following: PCOS, low back pain, anxiety/depression . Obesity is worsening. BMI is is above average; BMI management plan is completed. We discussed consulting a Bariatric surgeon..        Plan:  Further evaluation will include: CBC, CMP, Lipids, TSH, HgA1C, H.Pylori UBT, EKG, CXR, Pulmonary Function Testing and EGD.    The risks and benefits of the upper endoscopy were discussed with the patient in detail and all questions were answered.  Possibility of perforation, bleeding, aspiration, and anesthesia reaction were reviewed.  Patient agrees to proceed.      Additional clearances needed prior to surgery will include: none.     Patient understands that bariatric surgery is not cosmetic surgery but rather a tool to help make a lifelong commitment to lifestyle changes including diet, exercise and behavior modifications.  The patient has been educated today on those expected postoperative lifestyle changes.   Psychological and Nutritional consultations will be completed prior to surgery.  Instructions on how to access [employer] (an internet based site w/ educational surgical videos) were given to the patient.  Recommended perioperative vitamin supplementation was reviewed.  The importance of avoiding ASA/ NSAIDS/ steroids/ tobacco/nicotine/ hormones/ immunomodulators perioperatively was discussed in detail.  All questions/concerns have been addressed.      Further input to follow pending the above.           VIRGIE Watters

## 2022-03-30 LAB
ALBUMIN SERPL-MCNC: 4.2 G/DL (ref 3.8–4.8)
ALBUMIN/GLOB SERPL: 1.2 {RATIO} (ref 1.2–2.2)
ALP SERPL-CCNC: 72 IU/L (ref 44–121)
ALT SERPL-CCNC: 33 IU/L (ref 0–32)
AST SERPL-CCNC: 17 IU/L (ref 0–40)
BASOPHILS # BLD AUTO: 0.1 X10E3/UL (ref 0–0.2)
BASOPHILS NFR BLD AUTO: 1 %
BILIRUB SERPL-MCNC: 0.3 MG/DL (ref 0–1.2)
BUN SERPL-MCNC: 12 MG/DL (ref 6–20)
BUN/CREAT SERPL: 19 (ref 9–23)
CALCIUM SERPL-MCNC: 9.9 MG/DL (ref 8.7–10.2)
CHLORIDE SERPL-SCNC: 102 MMOL/L (ref 96–106)
CHOLEST SERPL-MCNC: 211 MG/DL (ref 100–199)
CO2 SERPL-SCNC: 23 MMOL/L (ref 20–29)
CREAT SERPL-MCNC: 0.64 MG/DL (ref 0.57–1)
EGFRCR SERPLBLD CKD-EPI 2021: 121 ML/MIN/1.73
EOSINOPHIL # BLD AUTO: 0.2 X10E3/UL (ref 0–0.4)
EOSINOPHIL NFR BLD AUTO: 1 %
ERYTHROCYTE [DISTWIDTH] IN BLOOD BY AUTOMATED COUNT: 12.8 % (ref 11.7–15.4)
GLOBULIN SER CALC-MCNC: 3.6 G/DL (ref 1.5–4.5)
GLUCOSE SERPL-MCNC: 72 MG/DL (ref 65–99)
HBA1C MFR BLD: 5.8 % (ref 4.8–5.6)
HCT VFR BLD AUTO: 42.8 % (ref 34–46.6)
HDLC SERPL-MCNC: 35 MG/DL
HGB BLD-MCNC: 14.2 G/DL (ref 11.1–15.9)
IMM GRANULOCYTES # BLD AUTO: 0 X10E3/UL (ref 0–0.1)
IMM GRANULOCYTES NFR BLD AUTO: 0 %
LDLC SERPL CALC-MCNC: 133 MG/DL (ref 0–99)
LYMPHOCYTES # BLD AUTO: 5.1 X10E3/UL (ref 0.7–3.1)
LYMPHOCYTES NFR BLD AUTO: 33 %
MCH RBC QN AUTO: 27.6 PG (ref 26.6–33)
MCHC RBC AUTO-ENTMCNC: 33.2 G/DL (ref 31.5–35.7)
MCV RBC AUTO: 83 FL (ref 79–97)
MONOCYTES # BLD AUTO: 0.9 X10E3/UL (ref 0.1–0.9)
MONOCYTES NFR BLD AUTO: 6 %
NEUTROPHILS # BLD AUTO: 9.1 X10E3/UL (ref 1.4–7)
NEUTROPHILS NFR BLD AUTO: 59 %
PLATELET # BLD AUTO: 476 X10E3/UL (ref 150–450)
POTASSIUM SERPL-SCNC: 5.1 MMOL/L (ref 3.5–5.2)
PROT SERPL-MCNC: 7.8 G/DL (ref 6–8.5)
RBC # BLD AUTO: 5.15 X10E6/UL (ref 3.77–5.28)
SODIUM SERPL-SCNC: 140 MMOL/L (ref 134–144)
TRIGL SERPL-MCNC: 241 MG/DL (ref 0–149)
TSH SERPL DL<=0.005 MIU/L-ACNC: 1.01 UIU/ML (ref 0.45–4.5)
UREA BREATH TEST QL: NEGATIVE
VLDLC SERPL CALC-MCNC: 43 MG/DL (ref 5–40)
WBC # BLD AUTO: 15.4 X10E3/UL (ref 3.4–10.8)

## 2022-04-04 ENCOUNTER — LAB (OUTPATIENT)
Dept: PREADMISSION TESTING | Facility: HOSPITAL | Age: 32
End: 2022-04-04

## 2022-04-04 DIAGNOSIS — Z87.891 FORMER SMOKER: ICD-10-CM

## 2022-04-04 DIAGNOSIS — R76.8 HELICOBACTER PYLORI ANTIBODY POSITIVE: ICD-10-CM

## 2022-04-04 DIAGNOSIS — E28.2 PCOS (POLYCYSTIC OVARIAN SYNDROME): ICD-10-CM

## 2022-04-04 DIAGNOSIS — R53.83 FATIGUE, UNSPECIFIED TYPE: ICD-10-CM

## 2022-04-04 DIAGNOSIS — E66.01 OBESITY, CLASS III, BMI 40-49.9 (MORBID OBESITY): ICD-10-CM

## 2022-04-04 LAB — SARS-COV-2 RNA PNL SPEC NAA+PROBE: NOT DETECTED

## 2022-04-04 PROCEDURE — U0004 COV-19 TEST NON-CDC HGH THRU: HCPCS

## 2022-04-04 PROCEDURE — C9803 HOPD COVID-19 SPEC COLLECT: HCPCS

## 2022-04-05 ENCOUNTER — DOCUMENTATION (OUTPATIENT)
Dept: BARIATRICS/WEIGHT MGMT | Facility: CLINIC | Age: 32
End: 2022-04-05

## 2022-04-05 NOTE — PROGRESS NOTES
"Weight Loss Surgery  Presurgical Nutrition Assessment     Taylor Rivas  04/07/2022 - Nutrition consultation completed by phone on this date  [id number]  [id number]  1990  female    Surgery desired: Sleeve Gastrectomy    Height: 162.6 cm (64\")  Weight: 114 kg (252 #)  BMI: 43.26    Past Medical History:   Diagnosis Date   • Anxiety and depression    • BMI 40.0-44.9, adult ([facility])    • Body piercing     ears   • GERD (gastroesophageal reflux disease)    • Headache    • Heartburn     will take antacid; happens weekly. No EGD, has had + h pylori antibody test   • Helicobacter pylori antibody positive 2018    treated with prevpac   • History of pneumonia    • Lumbar herniated disc     L5; PRN Nsaids   • Nephrolithiasis 06/21/2019    Patient reported ER visits but that she has not require surgical intervention in the past   • PCOS (polycystic ovarian syndrome)    • Pneumonia    • PONV (postoperative nausea and vomiting)    • S/P laparoscopic cholecystectomy 2016    gallstones   • Urinary tract infection      Past Surgical History:   Procedure Laterality Date   • CYSTOSCOPY      Done in the office setting, no anesthesia   • LAPAROSCOPIC CHOLECYSTECTOMY  2016    gallstones; no complications   • URETEROSCOPY LASER LITHOTRIPSY WITH STENT INSERTION Right 07/09/2019    Procedure: CYSTOSCOPY, URETEROSCOPY LASER LITHOTRIPSY WITH BASKET EXTRACTION OF STONE FRAGMENTS, RETROGRADE PYELOGRAM AND STENT INSERTION;  Surgeon: Michael Valerio MD;  Location: [facility];  Service: Urology   • WISDOM TOOTH EXTRACTION       No Known Allergies    Current Outpatient Medications:   •  busPIRone (BUSPAR) 10 MG tablet, Take 1 tablet by mouth 3 (Three) Times a Day., Disp: 270 tablet, Rfl: 2  •  escitalopram (LEXAPRO) 20 MG tablet, Take 1 tablet by mouth Daily., Disp: 90 tablet, Rfl: 3  •  fluconazole (DIFLUCAN) 150 MG tablet, Take 1 tablet by mouth Every Other Day as directed, Disp: 3 tablet, Rfl: 3  •  Annel RFF Rediject 300 " UNIT/0.5ML solution, INJECT 150 UNITS SUBCUTANEOUSLY (UNDER THE SKIN) EVERY DAY, Disp: , Rfl:   •  ibuprofen (ADVIL,MOTRIN) 600 MG tablet, Take 1 tablet by mouth Every 6 (Six) Hours As Needed for Mild Pain ., Disp: 30 tablet, Rfl: 3  •  letrozole (FEMARA) 2.5 MG tablet, Take 3 tablets by mouth Daily on cycle days 3-7., Disp: 15 tablet, Rfl: 5  •  medroxyPROGESTERone (PROVERA) 10 MG tablet, Take 1 tablet by mouth Daily., Disp: 10 tablet, Rfl: 5  •  metFORMIN (Glucophage) 500 MG tablet, Take 1 tablet by mouth 2 (Two) Times a Day With Meals., Disp: 60 tablet, Rfl: 2  •  nitrofurantoin, macrocrystal-monohydrate, (Macrobid) 100 MG capsule, Take 1 capsule by mouth 2 (Two) Times a Day., Disp: 14 capsule, Rfl: 0  •  norgestimate-ethinyl estradiol (Sprintec 28) 0.25-35 MG-MCG per tablet, Take 1 tablet by mouth Daily. Skip placebos, Disp: 28 each, Rfl: 5  •  ondansetron (ZOFRAN) 4 MG tablet, Take 1 tablet by mouth Every 6 Hours As Needed for Nausea or Vomiting., Disp: 20 tablet, Rfl: 5  •  Ovidrel 250 MCG/0.5ML injection, INJECT 1 PREFILLED SYRINGE SUBCUTANEOUSLY AS DIRECTED BY PHYSICIAN FOR A SINGLE DOSE, Disp: , Rfl:   •  phenazopyridine (Pyridium) 200 MG tablet, Take 1 tablet by mouth 3 (Three) Times a Day As Needed for Bladder Spasms., Disp: 6 tablet, Rfl: 0  •  Prenatal Vit-Fe Fumarate-FA (PRENATAL VITAMIN PO), Take  by mouth., Disp: , Rfl:   •  traZODone (DESYREL) 50 MG tablet, Take 1 tablet by mouth Every Night., Disp: 30 tablet, Rfl: 0  •  triamcinolone (KENALOG) 0.1 % ointment, Apply 1 application topically to the appropriate area as directed 2 (Two) Times a Day., Disp: 80 g, Rfl: 2      Nutrition Assessment    Estimated energy needs:  1840 kcal    Estimated calories for weight loss:  1500 kcal    IBW (Pounds):  145 #        Excess body weight (Pounds):  105 #       Nutrition Recall  24 Hour recall: (B) (L) (D) -  Reviewed and discussed with patient, who states she drinks one-12 oz can of regular Coke usually once  daily. Also enjoys edPULSEab lemonade tea, which is artificially sweetened but supplies caffeine.  She will work to wean herself off of all artificially and naturally sweetened beverages.  Breakfast @ 8 am = 1 cup scrambled eggs /c 3 slices eddy.  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  Dinner @ 6 pm at home = 3 chicken tenders /c 1 cup each mac 'n cheese & mashed potatoes.  Patient often eats grilled vegetables such as cabbage steak, cauliflower, etc, and usually prepares a balanced meal each evening. Diet is low in protein, high in processed/simple carbohydrate and lacking consistent intake of fruits and vegetables. Patient will focus on ingesting adequate protein in 3 regular balanced meals + 2 to 3 high protein snacks per day.        Exercise  exercises @ [employer]      Education    Provided information packet re:  Sleeve Gastrectomy  1. Reviewed guidelines for higher protein, limited carbohydrate diet to promote weight loss.  Encouraged patient to incorporate these principles of healthy eating from now until approximately 2 weeks prior to bariatric surgery date, when an even lower carbohydrate “liver-shrinking” regimen will be followed. (Information sheet re pre-op diet given).  Explained that after recovery from surgery this diet will again be followed to ensure further loss and for weight maintenance.    2. Encouraged patient to choose an acceptable protein supplement powder or shake for post-surgery liquid diet.  Provided product guidelines and examples.    3. Explained importance of goal setting to help in changing eating behaviors that are not conducive to weight loss.  Targeted several on a worksheet which also included spaces for patient to work on issues specific to them.  4. Provided follow-up options for support, including contact information for dietitians here, if desired.  Web-based support information and apps for  smart phones and computers given.  Noted that monthly support group is offered at this clinic, and that support is associated with successful weight loss.    Recommend that team proceed with surgery and follow per protocol.      Nutrition Goals   Dietary Guidelines per information packet as described above  Protein goal:  grams per day   Carbohydrate goal:  100-140 grams per day  Eliminate soda, sweet tea, etc.     Exercise Goals  Continue current exercise routine   Add 15-30 minutes of activity per day as tolerated      Niurka Hurt RD  04/05/2022  19:31 EDT

## 2022-04-06 DIAGNOSIS — Z20.822 ENCOUNTER FOR PREOPERATIVE SCREENING LABORATORY TESTING FOR COVID-19 VIRUS: Primary | ICD-10-CM

## 2022-04-06 DIAGNOSIS — Z01.812 ENCOUNTER FOR PREOPERATIVE SCREENING LABORATORY TESTING FOR COVID-19 VIRUS: Primary | ICD-10-CM

## 2022-04-07 ENCOUNTER — HOSPITAL ENCOUNTER (OUTPATIENT)
Dept: PULMONOLOGY | Facility: HOSPITAL | Age: 32
Discharge: HOME | End: 2022-04-07
Admitting: PHYSICIAN ASSISTANT

## 2022-04-07 DIAGNOSIS — Z87.891 FORMER SMOKER: ICD-10-CM

## 2022-04-07 PROCEDURE — 94010 BREATHING CAPACITY TEST: CPT

## 2022-04-07 PROCEDURE — 94010 BREATHING CAPACITY TEST: CPT | Performed by: INTERNAL MEDICINE

## 2022-04-07 PROCEDURE — 94727 GAS DIL/WSHOT DETER LNG VOL: CPT

## 2022-04-07 PROCEDURE — 94729 DIFFUSING CAPACITY: CPT | Performed by: INTERNAL MEDICINE

## 2022-04-07 PROCEDURE — 94729 DIFFUSING CAPACITY: CPT

## 2022-04-07 PROCEDURE — 94727 GAS DIL/WSHOT DETER LNG VOL: CPT | Performed by: INTERNAL MEDICINE

## 2022-04-19 RX ORDER — CHLORAL HYDRATE 500 MG
1 CAPSULE ORAL DAILY
COMMUNITY

## 2022-05-18 NOTE — PROGRESS NOTES
Subjective   Chief Complaint   Patient presents with   • Annual Exam     Well woman gyn exam   • Vaginal Discharge     Pt states that she is having sx of yeast infection that started on Sat.. Itchy, burning, discharge milky white.Diflucan was taken on 22     Taylor Rivas is a 31 y.o. year old  presenting to be seen for her annual exam. Her last pap was 3/2020, normal cytology. History of infertility. She has been seeing dr Fajardo for infertility. She is currently on a break due to hyperstimulated ovaries and pain.She is also in the process of preparing for bariatric surgery. She thinks she may have a yeast infection, she took a diflucan .  She reports vaginal itching and burning, some increased discharge.   SEXUAL Hx:  She is currently sexually active.  In the past year there there has been NO new sexual partners.    Condoms are never used.  She would not like to be screened for STD's at today's exam.  Current birth control method: currently trying to conceive, on break from infertilty treatments.  She is happy with her current method of contraception and does not want to discuss alternative methods of contraception.  MENSTRUAL Hx:  Patient's last menstrual period was 2022 (approximate).  In the past 6 months her cycles have been infrequent only occur when taking provera.  Her menstrual heavy when induced with provera.   Each month on average there are roughly 2 day(s) of very heavy flow.    Intermenstrual bleeding is absent  Post-coital bleeding is absent.  Dysmenorrhea: moderate and is not affecting her activities of daily living  PMS: mild and is not affecting her activities of daily living  Her cycles are not a source of concern for her that she wishes to discuss today.  HEALTH Hx:  She exercises regularly: no (but is planning to start exercising more ).  She wears her seat belt: yes.  She has concerns about domestic violence: no.  OTHER THINGS SHE WANTS TO DISCUSS  "TODAY:  Nothing else    The following portions of the patient's history were reviewed and updated as appropriate:problem list, current medications, allergies, past family history, past medical history, past social history and past surgical history.    Social History    Tobacco Use      Smoking status: Former Smoker        Packs/day: 1.00        Years: 6.00        Pack years: 6        Types: Cigarettes        Start date: 2/15/2008        Quit date: 2/15/2014        Years since quittin.2      Smokeless tobacco: Never Used    Review of Systems  Constitutional POS: nothing reported    NEG: anorexia or night sweats   Genitourinary POS: nothing reported    NEG: dysuria or hematuria      Gastointestinal POS: nothing reported    NEG: bloating, change in bowel habits, melena or reflux symptoms   Integument POS: nothing reported    NEG: moles that are changing in size, shape, color or rashes   Breast POS: nothing reported    NEG: persistent breast lump, skin dimpling or nipple discharge        Objective   /82 (BP Location: Left arm, Patient Position: Sitting, Cuff Size: Large Adult)   Ht 162.6 cm (64\")   Wt 119 kg (263 lb)   LMP 2022 (Approximate)   BMI 45.14 kg/m²     General:  well developed; well nourished  no acute distress  obese - Body mass index is 45.14 kg/m².   Skin:  No suspicious lesions seen   Thyroid: normal to inspection and palpation   Breasts:  Examined in supine position  Symmetric without masses or skin dimpling  Nipples normal without inversion, lesions or discharge  There are no palpable axillary nodes  Fibrocystic changes are present both breasts without a discrete mass  dense tissue noted bilaterally   Abdomen: soft, non-tender; no masses  no umbilical or inguinal hernias are present  no hepato-splenomegaly   Pelvis: Clinical staff was present for exam  :  urethral meatus normal;  Vaginal:  normal pink mucosa without prolapse or lesions. external perineal skin and labial folds noted " to be red, irritation noted   Cervix:  normal appearance.  Uterus:  not palpable.  Adnexa:  non palpable bilaterally.  Rectal:  digital rectal exam not performed; anus visually normal appearing.  Wet prep with yeast spores noted        Assessment   1. Well woman gynecological exam  2. Anovulation and infertility  3. Yeast infection     Plan     Pap was not done today.  I explained to Taylor that the recommendations for Pap smear interval in a low risk patient has lengthened to 3 years time.  I told Taylor she still needs to be seen in our office yearly for a full physical including breast and pelvic exam.  1. Wet prep and physical exam findings consistent with yeast.  She has taken Diflucan with minimal relief of symptoms prescription for Terazol 7 sent in.  2. Start Terazol cream.  A new prescription(s) was created today  3. The importance of keeping all planned follow-up and taking all medications as prescribed was emphasized.  4. Follow up for annual exam 1 year or as needed if yeast symptoms worsen persist  5. Discussed with patient importance of menses injection every 3 months with Provera while she is taking break from infertility.  Also discussed with patient after weight loss surgery recommendation to not conceive for the first 12 months.  Patient verbalizes understanding    New Medications Ordered This Visit   Medications   • terconazole (Terazol 7) 0.4 % vaginal cream     Sig: Insert 1 applicator into the vagina Every Night for 7 days.     Dispense:  45 g     Refill:  0          This note was electronically signed.    Claudia Marc, MACIEJ  May 24, 2022

## 2022-05-23 ENCOUNTER — LAB (OUTPATIENT)
Dept: LAB | Facility: HOSPITAL | Age: 32
End: 2022-05-23

## 2022-05-23 ENCOUNTER — APPOINTMENT (OUTPATIENT)
Dept: PREADMISSION TESTING | Facility: HOSPITAL | Age: 32
End: 2022-05-23

## 2022-05-23 DIAGNOSIS — Z20.822 ENCOUNTER FOR PREOPERATIVE SCREENING LABORATORY TESTING FOR COVID-19 VIRUS: ICD-10-CM

## 2022-05-23 DIAGNOSIS — Z01.812 ENCOUNTER FOR PREOPERATIVE SCREENING LABORATORY TESTING FOR COVID-19 VIRUS: ICD-10-CM

## 2022-05-23 PROCEDURE — C9803 HOPD COVID-19 SPEC COLLECT: HCPCS

## 2022-05-23 PROCEDURE — U0004 COV-19 TEST NON-CDC HGH THRU: HCPCS

## 2022-05-24 ENCOUNTER — OFFICE VISIT (OUTPATIENT)
Dept: OBSTETRICS AND GYNECOLOGY | Facility: CLINIC | Age: 32
End: 2022-05-24

## 2022-05-24 VITALS
HEIGHT: 64 IN | BODY MASS INDEX: 44.9 KG/M2 | DIASTOLIC BLOOD PRESSURE: 82 MMHG | WEIGHT: 263 LBS | SYSTOLIC BLOOD PRESSURE: 130 MMHG

## 2022-05-24 DIAGNOSIS — B37.9 YEAST INFECTION: Primary | ICD-10-CM

## 2022-05-24 LAB — SARS-COV-2 RNA PNL SPEC NAA+PROBE: NOT DETECTED

## 2022-05-24 PROCEDURE — 99395 PREV VISIT EST AGE 18-39: CPT | Performed by: NURSE PRACTITIONER

## 2022-05-26 ENCOUNTER — LAB REQUISITION (OUTPATIENT)
Dept: LAB | Facility: HOSPITAL | Age: 32
End: 2022-05-26

## 2022-05-26 DIAGNOSIS — R12 HEARTBURN: ICD-10-CM

## 2022-05-26 PROCEDURE — 88305 TISSUE EXAM BY PATHOLOGIST: CPT | Performed by: SURGERY

## 2022-05-27 LAB
CYTO UR: NORMAL
LAB AP CASE REPORT: NORMAL
LAB AP CLINICAL INFORMATION: NORMAL
PATH REPORT.FINAL DX SPEC: NORMAL
PATH REPORT.GROSS SPEC: NORMAL

## 2022-06-03 RX ORDER — OMEPRAZOLE 40 MG/1
40 CAPSULE, DELAYED RELEASE ORAL DAILY
Qty: 90 CAPSULE | Refills: 3 | Status: SHIPPED | OUTPATIENT
Start: 2022-06-03 | End: 2022-09-06

## 2022-06-08 ENCOUNTER — TELEPHONE (OUTPATIENT)
Dept: BARIATRICS/WEIGHT MGMT | Facility: CLINIC | Age: 32
End: 2022-06-08

## 2022-06-08 NOTE — TELEPHONE ENCOUNTER
Notified pt of message, verbalized understanding. No questions at this time      ----- Message from Elle Stein MD sent at 6/3/2022 10:49 AM EDT -----  Please let the patient know they have reflux esophagitis on the EGD biopsy and I am calling in omeprazole 40 mg daily to the pharmacy.

## 2022-06-20 ENCOUNTER — OFFICE VISIT (OUTPATIENT)
Dept: INTERNAL MEDICINE | Facility: CLINIC | Age: 32
End: 2022-06-20

## 2022-06-20 VITALS
WEIGHT: 266 LBS | RESPIRATION RATE: 15 BRPM | BODY MASS INDEX: 45.41 KG/M2 | HEART RATE: 74 BPM | DIASTOLIC BLOOD PRESSURE: 58 MMHG | OXYGEN SATURATION: 100 % | HEIGHT: 64 IN | SYSTOLIC BLOOD PRESSURE: 125 MMHG | TEMPERATURE: 97.3 F

## 2022-06-20 DIAGNOSIS — R19.02 LEFT UPPER QUADRANT ABDOMINAL SWELLING, MASS AND LUMP: ICD-10-CM

## 2022-06-20 DIAGNOSIS — D35.02 ADRENAL ADENOMA, LEFT: ICD-10-CM

## 2022-06-20 DIAGNOSIS — R10.12 ABDOMINAL PAIN, LUQ (LEFT UPPER QUADRANT): Primary | ICD-10-CM

## 2022-06-20 PROCEDURE — 99213 OFFICE O/P EST LOW 20 MIN: CPT | Performed by: NURSE PRACTITIONER

## 2022-06-22 ENCOUNTER — HOSPITAL ENCOUNTER (OUTPATIENT)
Dept: ULTRASOUND IMAGING | Facility: HOSPITAL | Age: 32
Discharge: HOME | End: 2022-06-22
Admitting: NURSE PRACTITIONER

## 2022-06-22 PROCEDURE — 76705 ECHO EXAM OF ABDOMEN: CPT

## 2022-06-30 DIAGNOSIS — Z01.84 IMMUNITY STATUS TESTING: Primary | ICD-10-CM

## 2022-07-10 NOTE — PROGRESS NOTES
Chief Complaint / Reason:      Chief Complaint   Patient presents with   • Chest Pain     Pain that is catching under left rib and tender to touch and check lymph node       Subjective     HPI  Patient presents today with complaints of pain under left rib and feels like it is catching when taking a deep breath or certain positions.  She denies any injury or trauma that she is aware of and states that it is tender to touch.  She did have previous swollen lymph node but denies any fever or chills.  Patient does have history of left adrenal adenoma.  History taken from: patient    PMH/FH/Social History were reviewed and updated appropriately in the electronic medical record.   Past Medical History:   Diagnosis Date   • Anxiety and depression    • BMI 40.0-44.9, adult (HCC)    • Body piercing     ears   • GERD (gastroesophageal reflux disease)    • Headache    • Heartburn     will take antacid; happens weekly. No EGD, has had + h pylori antibody test   • Helicobacter pylori antibody positive 2018    treated with prevpac   • History of pneumonia    • Lumbar herniated disc     L5; PRN Nsaids   • Nephrolithiasis 06/21/2019    Patient reported ER visits but that she has not require surgical intervention in the past   • PCOS (polycystic ovarian syndrome)    • Pneumonia    • PONV (postoperative nausea and vomiting)    • S/P laparoscopic cholecystectomy 2016    gallstones   • Urinary tract infection      Past Surgical History:   Procedure Laterality Date   • CYSTOSCOPY      Done in the office setting, no anesthesia   • ENDOSCOPY     • LAPAROSCOPIC CHOLECYSTECTOMY  2016    gallstones; no complications   • URETEROSCOPY LASER LITHOTRIPSY WITH STENT INSERTION Right 07/09/2019    Procedure: CYSTOSCOPY, URETEROSCOPY LASER LITHOTRIPSY WITH BASKET EXTRACTION OF STONE FRAGMENTS, RETROGRADE PYELOGRAM AND STENT INSERTION;  Surgeon: Michael Valerio MD;  Location: [facility];  Service: Urology   • WISDOM TOOTH EXTRACTION        Social History     Socioeconomic History   • Marital status: Single   • Number of children: 0   • Highest education level: Associate degree: academic program   Tobacco Use   • Smoking status: Former Smoker     Packs/day: 1.00     Years: 6.00     Pack years: 6.00     Types: Cigarettes     Start date: 2/15/2008     Quit date: 2/15/2014     Years since quittin.4   • Smokeless tobacco: Never Used   Vaping Use   • Vaping Use: Former   Substance and Sexual Activity   • Alcohol use: Yes     Alcohol/week: 0.0 standard drinks     Comment: Occasional, no history of abuse   • Drug use: No   • Sexual activity: Yes     Partners: Male     Family History   Problem Relation Age of Onset   • Thyroid disease Mother    • Thyroid cancer Mother    • Kidney cancer Mother    • Hypertension Maternal Grandmother    • Stroke Maternal Grandmother    • Lymphoma Paternal Grandfather 65        NH Lymphoma   • Breast cancer Other    • Stomach cancer Other    • Heart valve disorder Father    • No Known Problems Brother    • Bone cancer Paternal Aunt 50        osteosarcoma?   • Hyperlipidemia Maternal Grandfather    • No Known Problems Paternal Grandmother    • Ovarian cancer Neg Hx    • Uterine cancer Neg Hx    • Colon cancer Neg Hx        Review of Systems:   Review of Systems   Constitutional: Negative.    HENT: Negative.    Respiratory: Negative.         Left rib pain   Cardiovascular: Negative.    Allergic/Immunologic: Negative.    Neurological: Negative.    Psychiatric/Behavioral: Negative.          All other systems were reviewed and are negative.  Exceptions are noted in the subjective or above.      Objective     Vital Signs  Vitals:    22 1720   BP: 125/58   Pulse: 74   Resp: 15   Temp: 97.3 °F (36.3 °C)   SpO2: 100%       Body mass index is 45.66 kg/m².    Physical Exam  Vitals and nursing note reviewed.   Constitutional:       General: She is not in acute distress.     Appearance: She is well-developed.   Eyes:       General: No scleral icterus.     Conjunctiva/sclera: Conjunctivae normal.   Cardiovascular:      Rate and Rhythm: Normal rate and regular rhythm.      Heart sounds: Normal heart sounds.   Pulmonary:      Effort: Pulmonary effort is normal. No respiratory distress.      Breath sounds: Normal breath sounds.   Abdominal:      General: Bowel sounds are normal. There is no distension.      Palpations: Abdomen is soft. Abdomen is not rigid. There is no mass (lump left upper quadrant).      Tenderness: There is abdominal tenderness. There is no guarding or rebound. Negative signs include Almazan's sign and McBurney's sign.      Hernia: No hernia is present.   Skin:     General: Skin is warm and dry.      Capillary Refill: Capillary refill takes less than 2 seconds.      Findings: No rash.   Neurological:      Mental Status: She is alert and oriented to person, place, and time.              Results Review:    I reviewed the patient's previous clinical results.       Medication Review:     Current Outpatient Medications:   •  busPIRone (BUSPAR) 10 MG tablet, Take 1 tablet by mouth 3 (Three) Times a Day., Disp: 270 tablet, Rfl: 2  •  escitalopram (LEXAPRO) 20 MG tablet, Take 1 tablet by mouth Daily., Disp: 90 tablet, Rfl: 3  •  ibuprofen (ADVIL,MOTRIN) 600 MG tablet, Take 1 tablet by mouth Every 6 (Six) Hours As Needed for Mild Pain ., Disp: 30 tablet, Rfl: 3  •  metFORMIN (Glucophage) 500 MG tablet, Take 1 tablet by mouth 2 (Two) Times a Day With Meals., Disp: 60 tablet, Rfl: 2  •  Omega-3 1000 MG capsule, Take 1 capsule by mouth Daily., Disp: , Rfl:   •  omeprazole (priLOSEC) 40 MG capsule, Take 1 capsule by mouth Daily, Disp: 90 capsule, Rfl: 3  •  ondansetron (ZOFRAN) 4 MG tablet, Take 1 tablet by mouth Every 6 Hours As Needed for Nausea or Vomiting., Disp: 20 tablet, Rfl: 5  •  Prenatal Vit-Fe Fumarate-FA (PRENATAL VITAMIN PO), Take  by mouth., Disp: , Rfl:   •  vitamin D3 125 MCG (5000 UT) capsule capsule, Take 5,000  Units by mouth Daily., Disp: , Rfl:     Diagnoses and all orders for this visit:    Abdominal pain, LUQ (left upper quadrant)  -     US Abdomen Limited    Left upper quadrant abdominal swelling, mass and lump  -     US Abdomen Limited    Adrenal adenoma, left      Discussed worsening s/s and differential dx    Return if symptoms worsen or fail to improve.    Nguyen Desir, APRN  06/20/2022

## 2022-07-11 ENCOUNTER — LAB (OUTPATIENT)
Dept: LAB | Facility: HOSPITAL | Age: 32
End: 2022-07-11

## 2022-07-11 DIAGNOSIS — Z01.84 IMMUNITY STATUS TESTING: ICD-10-CM

## 2022-07-11 PROCEDURE — 86735 MUMPS ANTIBODY: CPT

## 2022-07-11 PROCEDURE — 86480 TB TEST CELL IMMUN MEASURE: CPT

## 2022-07-11 PROCEDURE — 36415 COLL VENOUS BLD VENIPUNCTURE: CPT

## 2022-07-11 PROCEDURE — 86762 RUBELLA ANTIBODY: CPT

## 2022-07-11 PROCEDURE — 86765 RUBEOLA ANTIBODY: CPT

## 2022-07-12 LAB
MEV IGG SER IA-ACNC: <13.5 AU/ML
MUV IGG SER IA-ACNC: <9 AU/ML
RUBV IGG SERPL IA-ACNC: 1.27 INDEX

## 2022-07-13 LAB
GAMMA INTERFERON BACKGROUND BLD IA-ACNC: 0.04 IU/ML
M TB IFN-G BLD-IMP: NEGATIVE
M TB IFN-G CD4+ BCKGRND COR BLD-ACNC: 0.05 IU/ML
M TB IFN-G CD4+CD8+ BCKGRND COR BLD-ACNC: 0.05 IU/ML
MITOGEN IGNF BLD-ACNC: >10 IU/ML
QUANTIFERON INCUBATION: NORMAL
SERVICE CMNT-IMP: NORMAL

## 2022-07-15 RX ORDER — SPIRONOLACTONE 50 MG/1
50 TABLET, FILM COATED ORAL DAILY
Qty: 30 TABLET | Refills: 5 | Status: SHIPPED | OUTPATIENT
Start: 2022-07-15 | End: 2022-08-05 | Stop reason: HOSPADM

## 2022-07-25 ENCOUNTER — CONSULT (OUTPATIENT)
Dept: BARIATRICS/WEIGHT MGMT | Facility: CLINIC | Age: 32
End: 2022-07-25

## 2022-07-25 VITALS
WEIGHT: 255 LBS | OXYGEN SATURATION: 98 % | TEMPERATURE: 97.3 F | HEART RATE: 96 BPM | BODY MASS INDEX: 43.54 KG/M2 | HEIGHT: 64 IN | SYSTOLIC BLOOD PRESSURE: 132 MMHG | DIASTOLIC BLOOD PRESSURE: 84 MMHG

## 2022-07-25 DIAGNOSIS — E28.2 PCOS (POLYCYSTIC OVARIAN SYNDROME): ICD-10-CM

## 2022-07-25 DIAGNOSIS — E66.01 MORBID OBESITY: Primary | ICD-10-CM

## 2022-07-25 DIAGNOSIS — R53.83 FATIGUE, UNSPECIFIED TYPE: ICD-10-CM

## 2022-07-25 DIAGNOSIS — K21.00 GASTROESOPHAGEAL REFLUX DISEASE WITH ESOPHAGITIS WITHOUT HEMORRHAGE: ICD-10-CM

## 2022-07-25 DIAGNOSIS — M54.9 BACK PAIN, UNSPECIFIED BACK LOCATION, UNSPECIFIED BACK PAIN LATERALITY, UNSPECIFIED CHRONICITY: ICD-10-CM

## 2022-07-25 PROCEDURE — 99214 OFFICE O/P EST MOD 30 MIN: CPT | Performed by: SURGERY

## 2022-07-25 NOTE — PROGRESS NOTES
"[facility] BARIATRIC SURGERY  [address]  [address]  [facility] [id number] [phone]      Patient  Name:  Taylor Rivas  :  1990      Date of Visit: 2022      Chief Complaint:  weight gain; unable to maintain weight loss    History of Present Illness:  Taylor Rivas is a 31 y.o. female who presents today for evaluation, education and consultation regarding weight loss surgery.     Patient has been overweight for many years, with numerous failed dietary/weight loss attempts.  She now has obesity related comorbidities of depression, GERD, headaches, PCOS, HLD, back pain and as such has decided to pursue weight loss surgery.    From intake:  \"GI: History of lap sandhya in 2016 secondary to gallstones.  No complications.  She has a longstanding history of intermittent heartburn that she treats with as needed Tums.  She has had a positive H. pylori antibody test in the past and was treated with a Prevpac.  She was never retested and has never had an EGD.  She denies any nausea, vomiting, abdominal pain, diarrhea, constipation.    Other past medical history includes PCOS on Metformin.  She has had significant issues with infertility and has been through multiple treatments without success.  She also has chronic low back pain secondary to herniated disc at L5-S1.  She is a former smoker x6 years and quit about 8 years ago.  She does not have any exposure to nicotine at home.  \"    2022 Update:        The patient lives in [place], [place], and works as a medical assistant for [employer].    No personal or family hx of VTE or clotting d/o.  No liver, lung, heart, or renal disease      Review of data:    LIZETTE: nothing  CBC: pending  CMP: pending  HP neg  Intake labs revealed hyperlipidemia/hypertriglyceridemia    EKG: pending  CXR: nl    EGD: 22 GEJ 40, tiny sliding HH.  Path: reflux esophagitis, rx omeprazole.         PFTs: Findings: Normal FEV1.  Normal FVC.  Normal " TLC.  Borderline reduced diffusing capacity.        Impression: Probably normal PFTs.  The diffusing capacity is borderline reduced.  The ERV is low likely related to obesity.           Last tobacco: 2014  Last NSAIDs: 2 weeks ago  Last ASA: n/a  Last steroids: remote  Last hormones: n/a    COVID-19 Questionnaire:    1.  Have you previously been tested for COVID-19?    []  No  [x]  Yes    2.  Were you ever positive for COVID-19?    []  No  [x]  Yes, 1/2022    3.  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    []  No  [x]  Yes    4.  Are you symptomatic for COVID-19 as defined by the CDC (fever, cough)?  If so, when did symptoms begin?    [x]  No  []  Yes    5.  Have you been hospitalized for COVID-19?  If so, were you in the ICU?  [x]  No    []  Yes, but not in the ICU    []  Yes, and I was in the ICU    6.  Are you a resident in a congregate (group care setting?)    [x]  No  []  Yes    7.  Are you pregnant?  [x]  No  []  Yes    8.  Are you vaccinated?    []  No  []  Yes, but only partially   [x]  Yes, fully           Past Medical History:   Diagnosis Date   • Anxiety and depression    • BMI 40.0-44.9, adult (HCC)    • Body piercing     ears   • GERD (gastroesophageal reflux disease)    • Headache    • Heartburn     will take antacid; happens weekly. No EGD, has had + h pylori antibody test   • Helicobacter pylori antibody positive 2018    treated with prevpac   • History of pneumonia    • Hypertriglyceridemia    • Lumbar herniated disc     L5; PRN Nsaids   • Nephrolithiasis 06/21/2019    Patient reported ER visits but that she has not require surgical intervention in the past   • Other hyperlipidemia    • PCOS (polycystic ovarian syndrome)    • Pneumonia    • PONV (postoperative nausea and vomiting)    • S/P laparoscopic cholecystectomy 2016    gallstones   • Urinary tract infection      Past Surgical History:   Procedure Laterality Date   • CYSTOSCOPY      Done in the office setting, no anesthesia   • ENDOSCOPY      • LAPAROSCOPIC CHOLECYSTECTOMY  2016    gallstones; no complications   • URETEROSCOPY LASER LITHOTRIPSY WITH STENT INSERTION Right 2019    Procedure: CYSTOSCOPY, URETEROSCOPY LASER LITHOTRIPSY WITH BASKET EXTRACTION OF STONE FRAGMENTS, RETROGRADE PYELOGRAM AND STENT INSERTION;  Surgeon: Michael Valerio MD;  Location: [facility];  Service: Urology   • WISDOM TOOTH EXTRACTION         No Known Allergies    Current Outpatient Medications:   •  busPIRone (BUSPAR) 10 MG tablet, Take 1 tablet by mouth 3 (Three) Times a Day., Disp: 270 tablet, Rfl: 2  •  escitalopram (LEXAPRO) 20 MG tablet, Take 1 tablet by mouth Daily., Disp: 90 tablet, Rfl: 3  •  ibuprofen (ADVIL,MOTRIN) 600 MG tablet, Take 1 tablet by mouth Every 6 (Six) Hours As Needed for Mild Pain ., Disp: 30 tablet, Rfl: 3  •  metFORMIN (Glucophage) 500 MG tablet, Take 1 tablet by mouth 2 (Two) Times a Day With Meals., Disp: 60 tablet, Rfl: 2  •  Omega-3 1000 MG capsule, Take 1 capsule by mouth Daily., Disp: , Rfl:   •  omeprazole (priLOSEC) 40 MG capsule, Take 1 capsule by mouth Daily, Disp: 90 capsule, Rfl: 3  •  ondansetron (ZOFRAN) 4 MG tablet, Take 1 tablet by mouth Every 6 Hours As Needed for Nausea or Vomiting., Disp: 20 tablet, Rfl: 5  •  Prenatal Vit-Fe Fumarate-FA (PRENATAL VITAMIN PO), Take  by mouth., Disp: , Rfl:   •  spironolactone (Aldactone) 50 MG tablet, Take 1 tablet by mouth Daily., Disp: 30 tablet, Rfl: 5  •  vitamin D3 125 MCG (5000 UT) capsule capsule, Take 5,000 Units by mouth Daily., Disp: , Rfl:     Social History     Socioeconomic History   • Marital status: Single   • Number of children: 0   • Highest education level: Associate degree: academic program   Tobacco Use   • Smoking status: Former Smoker     Packs/day: 1.00     Years: 6.00     Pack years: 6.00     Types: Cigarettes     Start date: 2/15/2008     Quit date: 2/15/2014     Years since quittin.4   • Smokeless tobacco: Never Used   Vaping Use   • Vaping Use: Former    Substance and Sexual Activity   • Alcohol use: Yes     Alcohol/week: 0.0 standard drinks     Comment: Occasional, no history of abuse   • Drug use: No   • Sexual activity: Yes     Partners: Male     Family History   Problem Relation Age of Onset   • Thyroid disease Mother    • Thyroid cancer Mother    • Kidney cancer Mother    • Hypertension Maternal Grandmother    • Stroke Maternal Grandmother    • Lymphoma Paternal Grandfather 65        NH Lymphoma   • Breast cancer Other    • Stomach cancer Other    • Heart valve disorder Father    • No Known Problems Brother    • Bone cancer Paternal Aunt 50        osteosarcoma?   • Hyperlipidemia Maternal Grandfather    • No Known Problems Paternal Grandmother    • Ovarian cancer Neg Hx    • Uterine cancer Neg Hx    • Colon cancer Neg Hx        Review of Systems   Constitutional: Positive for fatigue and unexpected weight gain. Negative for chills, diaphoresis, fever and unexpected weight loss.   HENT: Negative for congestion and facial swelling.    Eyes: Negative for blurred vision, double vision and discharge.   Respiratory: Negative for chest tightness, shortness of breath and stridor.    Cardiovascular: Negative for chest pain, palpitations and leg swelling.   Gastrointestinal: Negative for blood in stool.   Endocrine: Negative for polydipsia.   Genitourinary: Negative for hematuria.   Musculoskeletal: Positive for arthralgias.   Skin: Negative for color change.   Allergic/Immunologic: Negative for immunocompromised state.   Neurological: Negative for confusion.   Psychiatric/Behavioral: Negative for self-injury.       Physical Exam:  Vital Signs:  Weight: 116 kg (255 lb)   Body mass index is 43.77 kg/m².  Temp: 97.3 °F (36.3 °C)   Heart Rate: 96   BP: 132/84     Physical Exam  Vitals reviewed.   Constitutional:       Appearance: She is well-developed.   HENT:      Head: Normocephalic and atraumatic.      Nose: Nose normal.   Eyes:      Conjunctiva/sclera: Conjunctivae  normal.      Pupils: Pupils are equal, round, and reactive to light.   Neck:      Thyroid: No thyromegaly.      Vascular: No carotid bruit.      Trachea: No tracheal deviation.   Cardiovascular:      Rate and Rhythm: Normal rate and regular rhythm.      Heart sounds: Normal heart sounds.   Pulmonary:      Effort: Pulmonary effort is normal. No respiratory distress.      Breath sounds: Normal breath sounds.   Abdominal:      General: There is no distension.      Palpations: Abdomen is soft.      Tenderness: There is no abdominal tenderness.      Comments: Lap scars.  Carries most of weight on upper abdomen and back/neck.   Musculoskeletal:         General: No deformity. Normal range of motion.      Cervical back: Normal range of motion and neck supple.   Skin:     General: Skin is warm and dry.      Findings: No rash.   Neurological:      Mental Status: She is alert and oriented to person, place, and time.      Cranial Nerves: No cranial nerve deficit.      Coordination: Coordination normal.   Psychiatric:         Behavior: Behavior normal.         Thought Content: Thought content normal.         Judgment: Judgment normal.         Patient Active Problem List   Diagnosis   • PCOS (polycystic ovarian syndrome)   • HNP (herniated nucleus pulposus), lumbar L5-S1   • Nephrolithiasis   • Well woman exam   • Anxiety and depression   • BMI 40.0-44.9, adult (HCC)   • Heartburn   • Helicobacter pylori antibody positive   • GERD (gastroesophageal reflux disease)   • Elevated cholesterol   • S/P laparoscopic cholecystectomy   • PONV (postoperative nausea and vomiting)   • Lumbar herniated disc   • Morbid obesity (HCC)       Assessment:    Taylor Rivas is a 31 y.o. year old female with medically complicated obesity.    Weight loss surgery is deemed medically necessary given the following obesity related comorbidities including depression, GERD, headaches, PCOS, back pain with current Weight: 116 kg (255 lb) and Body mass  "index is 43.77 kg/m²..    Patient is aware that surgery is a tool, and that weight loss is not guaranteed but only seen in the context of appropriate use, follow up and exercise.    The patient was present for an approximately a 2.5 hour discussion of the purpose of weight loss surgery, how WLS is a tool to assist in achieving weight loss goals, the most common complications and how best to avoid them, and the strategies for short and long term weight loss.  Ample opportunity to discuss questions was available both in group and during the time of individual examination.    I reviewed all available preop labs, Xrays, tests, clearances, etc and signed off on these in the record.  All of this in addition to the patient's unique history and exam has been taken into consideration in determining their appropriate candidacy for weight loss surgery.    Complications  of laparoscopic/possible robotic gastric sleeve were discussed. The patient is well aware of the potential complications of surgery that include but not limited to bleeding, infections, deep venous thrombosis, pulmonary embolism, pulmonary complications such as pneumonia, cardiac events, hernias, small bowel obstruction, damage to the spleen or other organs, bowel injury, disfiguring scars, failure to lose weight, need for additional surgery, conversion to an open procedure, and death. Patient is also aware of complications which apply in this particular procedure that can include but are not limited to a \"leak\" at the staple line which in some instances may require conversion to gastric bypass.    The patient is aware if a hiatal hernia is encountered, it likely will be repaired.  R/B/A Rx to hiatal hernia repair were discussed as outlined in our long consent form.  Briefly risks in addition to those for LSG include recurrent hernia, YULIET, dysphagia, esophageal injury, pneumothorax, injury to the vagus nerves, injury to the thoracic duct, aorta or vena " cava.    Greater than 3 minutes was spent with the patient discussing avoiding all tobacco products and second hand smoke at least 2 weeks pre-operatively and 6 weeks post-operatively to minimize the risk of sleeve leak.  This included discussing the importance of avoiding even secondhand smoke as the risk of leak is increased.  Examples discussed:  I made it very clear that the patient understands they should avoid even riding in a car where someone has previously smoked in the last 2 weeks, living in a house where someone smokes (even if it's in a separate room/patio/attached garage, etc.) we discussed that they should not have a conversation with a group of people who are smoking even if it's outside.  They can be around wood burning fires and barbecue.  I told them I do not know if marijuana has a same effects but my overall recommendation is to avoid it for 2 weeks prior in 6 weeks after surgery.  They also are aware that nicotine may also increase the risk of leak and I strongly encouraged him to avoid that as well for 2 weeks prior in 6 weeks after surgery.    Discussed the risks, benefits and alternative therapies at great length as outlined in our extensive consent forms, consent videos, and educational teaching process under the direction of the center's .    A copy of the patient's signed informed consent is on file.    Plan:  Laparoscopic sleeve gastrectomy + HHR at [facility]      R/B/A Rx discussed to postop anticoagulation incl but not limited to bleeding, drug reaction, venothromboembolic events, etc. and she declined.    MDM high:  Elective procedure with the following risk factors: morbid obesity  4+ chronic medical problems reviewed.      Thank you Nguyen Desir APRN for allowing me to share in the care of our mutual patient.      Elle Stein MD

## 2022-07-27 PROBLEM — E66.01 MORBID OBESITY (HCC): Status: ACTIVE | Noted: 2022-07-27

## 2022-07-27 RX ORDER — ENOXAPARIN SODIUM 150 MG/ML
40 INJECTION SUBCUTANEOUS ONCE
Status: CANCELLED | OUTPATIENT
Start: 2022-07-27 | End: 2022-07-27

## 2022-07-27 RX ORDER — PANTOPRAZOLE SODIUM 40 MG/10ML
40 INJECTION, POWDER, LYOPHILIZED, FOR SOLUTION INTRAVENOUS ONCE
Status: CANCELLED | OUTPATIENT
Start: 2022-07-27 | End: 2022-07-27

## 2022-07-27 RX ORDER — SODIUM CHLORIDE 9 MG/ML
150 INJECTION, SOLUTION INTRAVENOUS CONTINUOUS
Status: CANCELLED | OUTPATIENT
Start: 2022-07-27

## 2022-07-27 RX ORDER — CHLORHEXIDINE GLUCONATE 0.12 MG/ML
15 RINSE ORAL
Status: CANCELLED | OUTPATIENT
Start: 2022-07-27 | End: 2022-07-27

## 2022-07-27 RX ORDER — ACETAMINOPHEN 500 MG
1000 TABLET ORAL ONCE
Status: CANCELLED | OUTPATIENT
Start: 2022-07-27 | End: 2022-07-27

## 2022-07-27 RX ORDER — GABAPENTIN 100 MG/1
600 CAPSULE ORAL ONCE
Status: CANCELLED | OUTPATIENT
Start: 2022-07-27 | End: 2022-07-27

## 2022-07-27 RX ORDER — SCOLOPAMINE TRANSDERMAL SYSTEM 1 MG/1
1 PATCH, EXTENDED RELEASE TRANSDERMAL ONCE
Status: CANCELLED | OUTPATIENT
Start: 2022-07-27 | End: 2022-07-27

## 2022-08-03 ENCOUNTER — HOSPITAL ENCOUNTER (OUTPATIENT)
Dept: GENERAL RADIOLOGY | Facility: HOSPITAL | Age: 32
Discharge: HOME | End: 2022-08-03

## 2022-08-03 ENCOUNTER — PRE-ADMISSION TESTING (OUTPATIENT)
Dept: PREADMISSION TESTING | Facility: HOSPITAL | Age: 32
End: 2022-08-03

## 2022-08-03 LAB
ALBUMIN SERPL-MCNC: 4.3 G/DL (ref 3.5–5.2)
ALBUMIN/GLOB SERPL: 1.1 G/DL
ALP SERPL-CCNC: 60 U/L (ref 39–117)
ALT SERPL W P-5'-P-CCNC: 34 U/L (ref 1–33)
ANION GAP SERPL CALCULATED.3IONS-SCNC: 10 MMOL/L (ref 5–15)
AST SERPL-CCNC: 26 U/L (ref 1–32)
BASOPHILS # BLD AUTO: 0.06 10*3/MM3 (ref 0–0.2)
BASOPHILS NFR BLD AUTO: 0.5 % (ref 0–1.5)
BILIRUB SERPL-MCNC: 0.3 MG/DL (ref 0–1.2)
BUN SERPL-MCNC: 17 MG/DL (ref 6–20)
BUN/CREAT SERPL: 19.8 (ref 7–25)
CALCIUM SPEC-SCNC: 9.6 MG/DL (ref 8.6–10.5)
CHLORIDE SERPL-SCNC: 100 MMOL/L (ref 98–107)
CO2 SERPL-SCNC: 26 MMOL/L (ref 22–29)
CREAT SERPL-MCNC: 0.86 MG/DL (ref 0.57–1)
DEPRECATED RDW RBC AUTO: 41.6 FL (ref 37–54)
EGFRCR SERPLBLD CKD-EPI 2021: 92.8 ML/MIN/1.73
EOSINOPHIL # BLD AUTO: 0.18 10*3/MM3 (ref 0–0.4)
EOSINOPHIL NFR BLD AUTO: 1.4 % (ref 0.3–6.2)
ERYTHROCYTE [DISTWIDTH] IN BLOOD BY AUTOMATED COUNT: 13.6 % (ref 12.3–15.4)
GLOBULIN UR ELPH-MCNC: 4 GM/DL
GLUCOSE SERPL-MCNC: 69 MG/DL (ref 65–99)
HBA1C MFR BLD: 5.6 % (ref 4.8–5.6)
HCT VFR BLD AUTO: 40.3 % (ref 34–46.6)
HGB BLD-MCNC: 13.4 G/DL (ref 12–15.9)
IMM GRANULOCYTES # BLD AUTO: 0.15 10*3/MM3 (ref 0–0.05)
IMM GRANULOCYTES NFR BLD AUTO: 1.1 % (ref 0–0.5)
LYMPHOCYTES # BLD AUTO: 5.12 10*3/MM3 (ref 0.7–3.1)
LYMPHOCYTES NFR BLD AUTO: 39.1 % (ref 19.6–45.3)
MCH RBC QN AUTO: 27.9 PG (ref 26.6–33)
MCHC RBC AUTO-ENTMCNC: 33.3 G/DL (ref 31.5–35.7)
MCV RBC AUTO: 84 FL (ref 79–97)
MONOCYTES # BLD AUTO: 0.58 10*3/MM3 (ref 0.1–0.9)
MONOCYTES NFR BLD AUTO: 4.4 % (ref 5–12)
NEUTROPHILS NFR BLD AUTO: 53.5 % (ref 42.7–76)
NEUTROPHILS NFR BLD AUTO: 7 10*3/MM3 (ref 1.7–7)
NRBC BLD AUTO-RTO: 0 /100 WBC (ref 0–0.2)
PLATELET # BLD AUTO: 451 10*3/MM3 (ref 140–450)
PMV BLD AUTO: 9.6 FL (ref 6–12)
POTASSIUM SERPL-SCNC: 4.3 MMOL/L (ref 3.5–5.2)
PROT SERPL-MCNC: 8.3 G/DL (ref 6–8.5)
QT INTERVAL: 412 MS
QTC INTERVAL: 457 MS
RBC # BLD AUTO: 4.8 10*6/MM3 (ref 3.77–5.28)
SARS-COV-2 RNA PNL SPEC NAA+PROBE: NOT DETECTED
SODIUM SERPL-SCNC: 136 MMOL/L (ref 136–145)
WBC NRBC COR # BLD: 13.09 10*3/MM3 (ref 3.4–10.8)

## 2022-08-03 PROCEDURE — 93010 ELECTROCARDIOGRAM REPORT: CPT | Performed by: INTERNAL MEDICINE

## 2022-08-03 PROCEDURE — 80053 COMPREHEN METABOLIC PANEL: CPT

## 2022-08-03 PROCEDURE — 71046 X-RAY EXAM CHEST 2 VIEWS: CPT

## 2022-08-03 PROCEDURE — 85025 COMPLETE CBC W/AUTO DIFF WBC: CPT

## 2022-08-03 PROCEDURE — U0004 COV-19 TEST NON-CDC HGH THRU: HCPCS

## 2022-08-03 PROCEDURE — 83036 HEMOGLOBIN GLYCOSYLATED A1C: CPT

## 2022-08-03 PROCEDURE — C9803 HOPD COVID-19 SPEC COLLECT: HCPCS

## 2022-08-03 PROCEDURE — 93005 ELECTROCARDIOGRAM TRACING: CPT

## 2022-08-03 NOTE — PAT
Patient to apply Chlorhexadine wipes  to surgical area (as instructed) the night before procedure and the AM of procedure. Wipes provided.    An arrival time for procedure was not given during PAT visit. If patient had any questions or concerns about their arrival time, they were instructed to contact their surgeon/physician.  Additionally, if the patient referred to an arrival time that was acquired from their my chart account, patient was encouraged to verify that time with their surgeon/physician.  NO arrival times given in Pre Admission Testing Department.    Patient instructed to drink 20 ounces (or until full) of Gatorade and it needs to be completed 1 hour (for Main OR patients) or 2 hours (scheduled  section patients) before given arrival time for procedure (NO RED Gatorade)    Patient verbalized understanding.    EKG and CXR as well as COVID done in PAT today.  CBC and CMP completed.

## 2022-08-04 ENCOUNTER — HOSPITAL ENCOUNTER (INPATIENT)
Facility: HOSPITAL | Age: 32
LOS: 1 days | Discharge: HOME | End: 2022-08-05
Attending: SURGERY | Admitting: SURGERY

## 2022-08-04 ENCOUNTER — ANESTHESIA EVENT (OUTPATIENT)
Dept: PERIOP | Facility: HOSPITAL | Age: 32
End: 2022-08-04

## 2022-08-04 ENCOUNTER — ANESTHESIA (OUTPATIENT)
Dept: PERIOP | Facility: HOSPITAL | Age: 32
End: 2022-08-04

## 2022-08-04 ENCOUNTER — ANESTHESIA EVENT CONVERTED (OUTPATIENT)
Dept: ANESTHESIOLOGY | Facility: HOSPITAL | Age: 32
End: 2022-08-04

## 2022-08-04 DIAGNOSIS — E66.01 MORBID OBESITY: ICD-10-CM

## 2022-08-04 LAB
B-HCG UR QL: NEGATIVE
EXPIRATION DATE: NORMAL
INTERNAL NEGATIVE CONTROL: NEGATIVE
INTERNAL POSITIVE CONTROL: POSITIVE
Lab: NORMAL

## 2022-08-04 PROCEDURE — 25010000002 ONDANSETRON PER 1 MG: Performed by: SURGERY

## 2022-08-04 PROCEDURE — 0BQT4ZZ REPAIR DIAPHRAGM, PERCUTANEOUS ENDOSCOPIC APPROACH: ICD-10-PCS | Performed by: SURGERY

## 2022-08-04 PROCEDURE — 0 CEFAZOLIN SODIUM-DEXTROSE 2-3 GM-%(50ML) RECONSTITUTED SOLUTION: Performed by: SURGERY

## 2022-08-04 PROCEDURE — 25010000002 GLUCAGON (HUMAN RECOMBINANT) 1 MG RECONSTITUTED SOLUTION: Performed by: NURSE ANESTHETIST, CERTIFIED REGISTERED

## 2022-08-04 PROCEDURE — 25010000002 ONDANSETRON PER 1 MG: Performed by: NURSE ANESTHETIST, CERTIFIED REGISTERED

## 2022-08-04 PROCEDURE — 25010000002 ROPIVACAINE PER 1 MG: Performed by: NURSE ANESTHETIST, CERTIFIED REGISTERED

## 2022-08-04 PROCEDURE — 25010000002 HYDROMORPHONE PER 4 MG: Performed by: NURSE ANESTHETIST, CERTIFIED REGISTERED

## 2022-08-04 PROCEDURE — 43775 LAP SLEEVE GASTRECTOMY: CPT | Performed by: SURGERY

## 2022-08-04 PROCEDURE — 81025 URINE PREGNANCY TEST: CPT | Performed by: SURGERY

## 2022-08-04 PROCEDURE — 25010000002 AMISULPRIDE (ANTIEMETIC) 5 MG/2ML SOLUTION: Performed by: SURGERY

## 2022-08-04 PROCEDURE — 25010000002 FENTANYL CITRATE (PF) 100 MCG/2ML SOLUTION: Performed by: NURSE ANESTHETIST, CERTIFIED REGISTERED

## 2022-08-04 PROCEDURE — 25010000002 DEXAMETHASONE PER 1 MG: Performed by: NURSE ANESTHETIST, CERTIFIED REGISTERED

## 2022-08-04 PROCEDURE — 25010000002 HYDROMORPHONE 1 MG/ML SOLUTION: Performed by: SURGERY

## 2022-08-04 PROCEDURE — 25010000002 LORAZEPAM PER 2 MG: Performed by: NURSE ANESTHETIST, CERTIFIED REGISTERED

## 2022-08-04 PROCEDURE — 25010000002 MIDAZOLAM PER 1MG: Performed by: NURSE ANESTHETIST, CERTIFIED REGISTERED

## 2022-08-04 PROCEDURE — 25010000002 HYDROMORPHONE 1 MG/ML SOLUTION: Performed by: NURSE ANESTHETIST, CERTIFIED REGISTERED

## 2022-08-04 PROCEDURE — 25010000002 PROPOFOL 200 MG/20ML EMULSION: Performed by: NURSE ANESTHETIST, CERTIFIED REGISTERED

## 2022-08-04 PROCEDURE — 0DJ08ZZ INSPECTION OF UPPER INTESTINAL TRACT, VIA NATURAL OR ARTIFICIAL OPENING ENDOSCOPIC: ICD-10-PCS | Performed by: SURGERY

## 2022-08-04 PROCEDURE — C9399 UNCLASSIFIED DRUGS OR BIOLOG: HCPCS | Performed by: SURGERY

## 2022-08-04 PROCEDURE — 0DB64Z3 EXCISION OF STOMACH, PERCUTANEOUS ENDOSCOPIC APPROACH, VERTICAL: ICD-10-PCS | Performed by: SURGERY

## 2022-08-04 DEVICE — ABSORBABLE WOUND CLOSURE DEVICE
Type: IMPLANTABLE DEVICE | Site: ABDOMEN | Status: FUNCTIONAL
Brand: SYNETURE

## 2022-08-04 DEVICE — SEALANT WND FIBRIN TISSEEL PREFIL/SYR/PRIMAFZ 4ML: Type: IMPLANTABLE DEVICE | Site: ABDOMEN | Status: FUNCTIONAL

## 2022-08-04 DEVICE — FLOSEAL HEMOSTATIC MATRIX, 10ML
Type: IMPLANTABLE DEVICE | Site: ABDOMEN | Status: FUNCTIONAL
Brand: FLOSEAL HEMOSTATIC MATRIX

## 2022-08-04 DEVICE — IMPLANTABLE DEVICE
Type: IMPLANTABLE DEVICE | Site: ABDOMEN | Status: FUNCTIONAL
Brand: TITAN SGS STANDARD GASTRIC STAPLER

## 2022-08-04 RX ORDER — GABAPENTIN 250 MG/5ML
100 SOLUTION ORAL 3 TIMES DAILY
Status: DISCONTINUED | OUTPATIENT
Start: 2022-08-04 | End: 2022-08-05 | Stop reason: HOSPADM

## 2022-08-04 RX ORDER — METOCLOPRAMIDE HYDROCHLORIDE 5 MG/ML
10 INJECTION INTRAMUSCULAR; INTRAVENOUS EVERY 6 HOURS PRN
Status: DISCONTINUED | OUTPATIENT
Start: 2022-08-04 | End: 2022-08-05 | Stop reason: HOSPADM

## 2022-08-04 RX ORDER — ALPRAZOLAM 0.25 MG/1
0.25 TABLET ORAL 2 TIMES DAILY PRN
Status: DISCONTINUED | OUTPATIENT
Start: 2022-08-04 | End: 2022-08-05 | Stop reason: HOSPADM

## 2022-08-04 RX ORDER — HYDROMORPHONE HCL 110MG/55ML
PATIENT CONTROLLED ANALGESIA SYRINGE INTRAVENOUS AS NEEDED
Status: DISCONTINUED | OUTPATIENT
Start: 2022-08-04 | End: 2022-08-04 | Stop reason: SURG

## 2022-08-04 RX ORDER — ESCITALOPRAM OXALATE 20 MG/1
20 TABLET ORAL DAILY
Status: DISCONTINUED | OUTPATIENT
Start: 2022-08-05 | End: 2022-08-05 | Stop reason: HOSPADM

## 2022-08-04 RX ORDER — CHLORHEXIDINE GLUCONATE 0.12 MG/ML
15 RINSE ORAL
Status: COMPLETED | OUTPATIENT
Start: 2022-08-04 | End: 2022-08-04

## 2022-08-04 RX ORDER — SODIUM CHLORIDE 9 MG/ML
150 INJECTION, SOLUTION INTRAVENOUS CONTINUOUS
Status: DISCONTINUED | OUTPATIENT
Start: 2022-08-04 | End: 2022-08-05 | Stop reason: HOSPADM

## 2022-08-04 RX ORDER — ACETAMINOPHEN 500 MG
1000 TABLET ORAL EVERY 8 HOURS SCHEDULED
Status: DISCONTINUED | OUTPATIENT
Start: 2022-08-04 | End: 2022-08-05 | Stop reason: HOSPADM

## 2022-08-04 RX ORDER — ENOXAPARIN SODIUM 100 MG/ML
40 INJECTION SUBCUTANEOUS DAILY
Status: DISCONTINUED | OUTPATIENT
Start: 2022-08-05 | End: 2022-08-05 | Stop reason: HOSPADM

## 2022-08-04 RX ORDER — CEFAZOLIN SODIUM 2 G/50ML
2 SOLUTION INTRAVENOUS
Status: COMPLETED | OUTPATIENT
Start: 2022-08-04 | End: 2022-08-04

## 2022-08-04 RX ORDER — DEXAMETHASONE SODIUM PHOSPHATE 4 MG/ML
INJECTION, SOLUTION INTRA-ARTICULAR; INTRALESIONAL; INTRAMUSCULAR; INTRAVENOUS; SOFT TISSUE AS NEEDED
Status: DISCONTINUED | OUTPATIENT
Start: 2022-08-04 | End: 2022-08-04 | Stop reason: SURG

## 2022-08-04 RX ORDER — DIPHENHYDRAMINE HYDROCHLORIDE 50 MG/ML
25 INJECTION INTRAMUSCULAR; INTRAVENOUS EVERY 4 HOURS PRN
Status: DISCONTINUED | OUTPATIENT
Start: 2022-08-04 | End: 2022-08-05 | Stop reason: HOSPADM

## 2022-08-04 RX ORDER — ROCURONIUM BROMIDE 10 MG/ML
INJECTION, SOLUTION INTRAVENOUS AS NEEDED
Status: DISCONTINUED | OUTPATIENT
Start: 2022-08-04 | End: 2022-08-04 | Stop reason: SURG

## 2022-08-04 RX ORDER — SCOLOPAMINE TRANSDERMAL SYSTEM 1 MG/1
1 PATCH, EXTENDED RELEASE TRANSDERMAL ONCE
Status: DISCONTINUED | OUTPATIENT
Start: 2022-08-04 | End: 2022-08-04

## 2022-08-04 RX ORDER — MIDAZOLAM HYDROCHLORIDE 2 MG/2ML
INJECTION, SOLUTION INTRAMUSCULAR; INTRAVENOUS AS NEEDED
Status: DISCONTINUED | OUTPATIENT
Start: 2022-08-04 | End: 2022-08-04 | Stop reason: SURG

## 2022-08-04 RX ORDER — ONDANSETRON 4 MG/1
4 TABLET, FILM COATED ORAL EVERY 6 HOURS PRN
Status: DISCONTINUED | OUTPATIENT
Start: 2022-08-08 | End: 2022-08-05 | Stop reason: HOSPADM

## 2022-08-04 RX ORDER — GABAPENTIN 300 MG/1
600 CAPSULE ORAL ONCE
Status: COMPLETED | OUTPATIENT
Start: 2022-08-04 | End: 2022-08-04

## 2022-08-04 RX ORDER — SIMETHICONE 80 MG
80 TABLET,CHEWABLE ORAL 4 TIMES DAILY PRN
Status: DISCONTINUED | OUTPATIENT
Start: 2022-08-04 | End: 2022-08-05 | Stop reason: HOSPADM

## 2022-08-04 RX ORDER — ACETAMINOPHEN 160 MG/5ML
1000 SOLUTION ORAL EVERY 8 HOURS SCHEDULED
Status: DISCONTINUED | OUTPATIENT
Start: 2022-08-04 | End: 2022-08-05 | Stop reason: HOSPADM

## 2022-08-04 RX ORDER — GABAPENTIN 100 MG/1
100 CAPSULE ORAL 3 TIMES DAILY
Status: DISCONTINUED | OUTPATIENT
Start: 2022-08-04 | End: 2022-08-05 | Stop reason: HOSPADM

## 2022-08-04 RX ORDER — ONDANSETRON 2 MG/ML
4 INJECTION INTRAMUSCULAR; INTRAVENOUS EVERY 6 HOURS PRN
Status: DISCONTINUED | OUTPATIENT
Start: 2022-08-04 | End: 2022-08-05 | Stop reason: HOSPADM

## 2022-08-04 RX ORDER — CYANOCOBALAMIN 1000 UG/ML
1000 INJECTION, SOLUTION INTRAMUSCULAR; SUBCUTANEOUS ONCE
Status: COMPLETED | OUTPATIENT
Start: 2022-08-05 | End: 2022-08-05

## 2022-08-04 RX ORDER — BUSPIRONE HYDROCHLORIDE 10 MG/1
10 TABLET ORAL 3 TIMES DAILY
Status: DISCONTINUED | OUTPATIENT
Start: 2022-08-04 | End: 2022-08-05 | Stop reason: HOSPADM

## 2022-08-04 RX ORDER — LIDOCAINE HYDROCHLORIDE 20 MG/ML
INJECTION, SOLUTION INTRAVENOUS AS NEEDED
Status: DISCONTINUED | OUTPATIENT
Start: 2022-08-04 | End: 2022-08-04 | Stop reason: SURG

## 2022-08-04 RX ORDER — BUPIVACAINE HYDROCHLORIDE AND EPINEPHRINE 5; 5 MG/ML; UG/ML
INJECTION, SOLUTION EPIDURAL; INTRACAUDAL; PERINEURAL AS NEEDED
Status: DISCONTINUED | OUTPATIENT
Start: 2022-08-04 | End: 2022-08-04 | Stop reason: HOSPADM

## 2022-08-04 RX ORDER — SODIUM CHLORIDE AND POTASSIUM CHLORIDE 150; 450 MG/100ML; MG/100ML
100 INJECTION, SOLUTION INTRAVENOUS CONTINUOUS
Status: DISCONTINUED | OUTPATIENT
Start: 2022-08-05 | End: 2022-08-05 | Stop reason: HOSPADM

## 2022-08-04 RX ORDER — ONDANSETRON 2 MG/ML
INJECTION INTRAMUSCULAR; INTRAVENOUS AS NEEDED
Status: DISCONTINUED | OUTPATIENT
Start: 2022-08-04 | End: 2022-08-04 | Stop reason: SURG

## 2022-08-04 RX ORDER — ONDANSETRON 2 MG/ML
4 INJECTION INTRAMUSCULAR; INTRAVENOUS ONCE AS NEEDED
Status: DISCONTINUED | OUTPATIENT
Start: 2022-08-04 | End: 2022-08-04 | Stop reason: HOSPADM

## 2022-08-04 RX ORDER — SODIUM CHLORIDE 0.9 % (FLUSH) 0.9 %
1-10 SYRINGE (ML) INJECTION AS NEEDED
Status: DISCONTINUED | OUTPATIENT
Start: 2022-08-04 | End: 2022-08-05 | Stop reason: HOSPADM

## 2022-08-04 RX ORDER — FENTANYL CITRATE 50 UG/ML
INJECTION, SOLUTION INTRAMUSCULAR; INTRAVENOUS AS NEEDED
Status: DISCONTINUED | OUTPATIENT
Start: 2022-08-04 | End: 2022-08-04 | Stop reason: SURG

## 2022-08-04 RX ORDER — LORAZEPAM 2 MG/ML
1 INJECTION INTRAMUSCULAR
Status: DISCONTINUED | OUTPATIENT
Start: 2022-08-04 | End: 2022-08-04 | Stop reason: HOSPADM

## 2022-08-04 RX ORDER — SODIUM CHLORIDE, SODIUM LACTATE, POTASSIUM CHLORIDE, CALCIUM CHLORIDE 600; 310; 30; 20 MG/100ML; MG/100ML; MG/100ML; MG/100ML
150 INJECTION, SOLUTION INTRAVENOUS CONTINUOUS
Status: ACTIVE | OUTPATIENT
Start: 2022-08-04 | End: 2022-08-05

## 2022-08-04 RX ORDER — HYDROMORPHONE HYDROCHLORIDE 2 MG/1
2 TABLET ORAL
Status: DISCONTINUED | OUTPATIENT
Start: 2022-08-04 | End: 2022-08-05 | Stop reason: HOSPADM

## 2022-08-04 RX ORDER — PROPOFOL 10 MG/ML
INJECTION, EMULSION INTRAVENOUS AS NEEDED
Status: DISCONTINUED | OUTPATIENT
Start: 2022-08-04 | End: 2022-08-04

## 2022-08-04 RX ORDER — PROPOFOL 10 MG/ML
INJECTION, EMULSION INTRAVENOUS AS NEEDED
Status: DISCONTINUED | OUTPATIENT
Start: 2022-08-04 | End: 2022-08-04 | Stop reason: SURG

## 2022-08-04 RX ORDER — CEFAZOLIN SODIUM 2 G/50ML
2 SOLUTION INTRAVENOUS EVERY 8 HOURS
Status: COMPLETED | OUTPATIENT
Start: 2022-08-04 | End: 2022-08-05

## 2022-08-04 RX ORDER — PROMETHAZINE HYDROCHLORIDE 25 MG/1
12.5 TABLET ORAL EVERY 6 HOURS PRN
Status: DISCONTINUED | OUTPATIENT
Start: 2022-08-04 | End: 2022-08-05 | Stop reason: HOSPADM

## 2022-08-04 RX ORDER — LABETALOL HYDROCHLORIDE 5 MG/ML
10 INJECTION, SOLUTION INTRAVENOUS
Status: DISCONTINUED | OUTPATIENT
Start: 2022-08-04 | End: 2022-08-05 | Stop reason: HOSPADM

## 2022-08-04 RX ORDER — NALOXONE HCL 0.4 MG/ML
0.1 VIAL (ML) INJECTION
Status: DISCONTINUED | OUTPATIENT
Start: 2022-08-04 | End: 2022-08-05 | Stop reason: HOSPADM

## 2022-08-04 RX ORDER — ACETAMINOPHEN 500 MG
1000 TABLET ORAL ONCE
Status: COMPLETED | OUTPATIENT
Start: 2022-08-04 | End: 2022-08-04

## 2022-08-04 RX ORDER — ENOXAPARIN SODIUM 100 MG/ML
40 INJECTION SUBCUTANEOUS ONCE
Status: DISCONTINUED | OUTPATIENT
Start: 2022-08-04 | End: 2022-08-04 | Stop reason: HOSPADM

## 2022-08-04 RX ORDER — PANTOPRAZOLE SODIUM 40 MG/10ML
40 INJECTION, POWDER, LYOPHILIZED, FOR SOLUTION INTRAVENOUS ONCE
Status: COMPLETED | OUTPATIENT
Start: 2022-08-04 | End: 2022-08-04

## 2022-08-04 RX ORDER — ROPIVACAINE HYDROCHLORIDE 5 MG/ML
INJECTION, SOLUTION EPIDURAL; INFILTRATION; PERINEURAL
Status: COMPLETED | OUTPATIENT
Start: 2022-08-04 | End: 2022-08-04

## 2022-08-04 RX ORDER — OXYCODONE HYDROCHLORIDE 5 MG/1
5 TABLET ORAL EVERY 6 HOURS PRN
Status: DISCONTINUED | OUTPATIENT
Start: 2022-08-04 | End: 2022-08-05 | Stop reason: HOSPADM

## 2022-08-04 RX ORDER — SODIUM CHLORIDE 0.9 % (FLUSH) 0.9 %
10 SYRINGE (ML) INJECTION EVERY 12 HOURS SCHEDULED
Status: DISCONTINUED | OUTPATIENT
Start: 2022-08-04 | End: 2022-08-05 | Stop reason: HOSPADM

## 2022-08-04 RX ADMIN — FENTANYL CITRATE 100 MCG: 50 INJECTION INTRAMUSCULAR; INTRAVENOUS at 09:25

## 2022-08-04 RX ADMIN — LORAZEPAM 1 MG: 2 INJECTION, SOLUTION INTRAMUSCULAR; INTRAVENOUS at 12:01

## 2022-08-04 RX ADMIN — BUSPIRONE HYDROCHLORIDE 10 MG: 10 TABLET ORAL at 15:17

## 2022-08-04 RX ADMIN — ROCURONIUM BROMIDE 50 MG: 10 INJECTION INTRAVENOUS at 09:25

## 2022-08-04 RX ADMIN — ROCURONIUM BROMIDE 10 MG: 10 INJECTION INTRAVENOUS at 10:44

## 2022-08-04 RX ADMIN — SCOPALAMINE 1 PATCH: 1 PATCH, EXTENDED RELEASE TRANSDERMAL at 08:13

## 2022-08-04 RX ADMIN — ONDANSETRON 4 MG: 2 INJECTION INTRAMUSCULAR; INTRAVENOUS at 09:20

## 2022-08-04 RX ADMIN — GABAPENTIN 600 MG: 300 CAPSULE ORAL at 08:15

## 2022-08-04 RX ADMIN — ROPIVACAINE HYDROCHLORIDE 20 ML: 5 INJECTION, SOLUTION EPIDURAL; INFILTRATION; PERINEURAL at 09:09

## 2022-08-04 RX ADMIN — CEFAZOLIN SODIUM 2 G: 2 SOLUTION INTRAVENOUS at 17:37

## 2022-08-04 RX ADMIN — SODIUM CHLORIDE: 9 INJECTION, SOLUTION INTRAVENOUS at 09:19

## 2022-08-04 RX ADMIN — HYDROMORPHONE HYDROCHLORIDE 2 MG: 2 TABLET ORAL at 21:57

## 2022-08-04 RX ADMIN — GLUCAGON HYDROCHLORIDE 1 MG: KIT at 10:27

## 2022-08-04 RX ADMIN — ACETAMINOPHEN 1000 MG: 500 TABLET ORAL at 08:15

## 2022-08-04 RX ADMIN — SODIUM CHLORIDE, POTASSIUM CHLORIDE, SODIUM LACTATE AND CALCIUM CHLORIDE 150 ML/HR: 600; 310; 30; 20 INJECTION, SOLUTION INTRAVENOUS at 21:57

## 2022-08-04 RX ADMIN — BUSPIRONE HYDROCHLORIDE 10 MG: 10 TABLET ORAL at 21:57

## 2022-08-04 RX ADMIN — ROCURONIUM BROMIDE 20 MG: 10 INJECTION INTRAVENOUS at 10:28

## 2022-08-04 RX ADMIN — DEXAMETHASONE SODIUM PHOSPHATE 4 MG: 4 INJECTION, SOLUTION INTRAMUSCULAR; INTRAVENOUS at 09:30

## 2022-08-04 RX ADMIN — HYDROMORPHONE HYDROCHLORIDE 0.5 MG: 1 INJECTION, SOLUTION INTRAMUSCULAR; INTRAVENOUS; SUBCUTANEOUS at 18:07

## 2022-08-04 RX ADMIN — MIDAZOLAM HYDROCHLORIDE 2 MG: 1 INJECTION, SOLUTION INTRAMUSCULAR; INTRAVENOUS at 09:20

## 2022-08-04 RX ADMIN — AMISULPRIDE 10 MG: 2.5 INJECTION, SOLUTION INTRAVENOUS at 11:09

## 2022-08-04 RX ADMIN — GABAPENTIN 100 MG: 100 CAPSULE ORAL at 15:17

## 2022-08-04 RX ADMIN — 0.12% CHLORHEXIDINE GLUCONATE 15 ML: 1.2 RINSE ORAL at 08:22

## 2022-08-04 RX ADMIN — 0.12% CHLORHEXIDINE GLUCONATE 15 ML: 1.2 RINSE ORAL at 08:17

## 2022-08-04 RX ADMIN — HYDROMORPHONE HYDROCHLORIDE 0.5 MG: 1 INJECTION, SOLUTION INTRAMUSCULAR; INTRAVENOUS; SUBCUTANEOUS at 11:51

## 2022-08-04 RX ADMIN — SODIUM CHLORIDE, POTASSIUM CHLORIDE, SODIUM LACTATE AND CALCIUM CHLORIDE 150 ML/HR: 600; 310; 30; 20 INJECTION, SOLUTION INTRAVENOUS at 14:47

## 2022-08-04 RX ADMIN — PANTOPRAZOLE SODIUM 40 MG: 40 INJECTION, POWDER, FOR SOLUTION INTRAVENOUS at 08:13

## 2022-08-04 RX ADMIN — SUGAMMADEX 200 MG: 100 INJECTION, SOLUTION INTRAVENOUS at 11:20

## 2022-08-04 RX ADMIN — ACETAMINOPHEN 1000 MG: 650 SOLUTION ORAL at 15:17

## 2022-08-04 RX ADMIN — ONDANSETRON 4 MG: 2 INJECTION INTRAMUSCULAR; INTRAVENOUS at 15:24

## 2022-08-04 RX ADMIN — CEFAZOLIN SODIUM 2 G: 2 SOLUTION INTRAVENOUS at 09:20

## 2022-08-04 RX ADMIN — PROPOFOL 180 MG: 10 INJECTION, EMULSION INTRAVENOUS at 09:25

## 2022-08-04 RX ADMIN — GABAPENTIN 100 MG: 100 CAPSULE ORAL at 21:57

## 2022-08-04 RX ADMIN — HYDROMORPHONE HYDROCHLORIDE 1 MG: 2 INJECTION, SOLUTION INTRAMUSCULAR; INTRAVENOUS; SUBCUTANEOUS at 10:43

## 2022-08-04 RX ADMIN — HYDROMORPHONE HYDROCHLORIDE 1 MG: 2 INJECTION, SOLUTION INTRAMUSCULAR; INTRAVENOUS; SUBCUTANEOUS at 09:35

## 2022-08-04 RX ADMIN — LIDOCAINE HYDROCHLORIDE 60 MG: 20 INJECTION, SOLUTION INTRAVENOUS at 09:25

## 2022-08-04 RX ADMIN — ACETAMINOPHEN 1000 MG: 500 TABLET ORAL at 21:57

## 2022-08-04 RX ADMIN — Medication 10 ML: at 21:58

## 2022-08-04 RX ADMIN — SODIUM CHLORIDE 1000 ML: 9 INJECTION, SOLUTION INTRAVENOUS at 08:16

## 2022-08-04 NOTE — PLAN OF CARE
Goal Outcome Evaluation:  Plan of Care Reviewed With: patient        Progress: no change  Outcome Evaluation: Pt. admitted from [facility] with a Gastric Sleeve. Pt A&OX3. On 2L NC with O2 sats 94%. Pain controlled. x6 lap sites to abdomen with adhesive gle C/D/I. VSS. Pt. voices no concerns at this time.

## 2022-08-04 NOTE — ANESTHESIA PREPROCEDURE EVALUATION
Anesthesia Evaluation     Patient summary reviewed and Nursing notes reviewed   history of anesthetic complications: PONV  NPO Solid Status: > 8 hours  NPO Liquid Status: > 8 hours           Airway   Mallampati: II  TM distance: >3 FB  Neck ROM: full  Possible difficult intubation  Dental      Pulmonary    Cardiovascular     (+) hyperlipidemia,       Neuro/Psych  (+) psychiatric history,    GI/Hepatic/Renal/Endo    (+) obesity, morbid obesity, GERD,  renal disease,     Musculoskeletal     (+) back pain,   Abdominal    Substance History      OB/GYN          Other                        Anesthesia Plan    ASA 3     general with block     intravenous induction     Anesthetic plan, risks, benefits, and alternatives have been provided, discussed and informed consent has been obtained with: patient.        CODE STATUS:

## 2022-08-04 NOTE — OP NOTE
OPERATIVE REPORT    DATE: 08/04/22    PATIENT: Taylor Rivas    PREOPERATIVE DIAGNOSIS:    1. Morbid obesity with comorbidities  2.  Hiatal hernia    POSTOPERATIVE DIAGNOSIS:    1. Morbid obesity with multiple comorbidities.  2.  Hiatal hernia    PROCEDURES PERFORMED:  1. Laparoscopic Sleeve Gastrectomy (85% subtotal vertical gastrectomy, Titan ([id number])  2.  Esophagogastroduodenoscopy  3.  Hiatal hernia repair (not paraesophageal)     SURGEON:  Elle Stein M.D.    ASSISTANT: Triny Dodd CSA, was instrumental in the success of this case and performed duties that included camera holding, tissue retraction, skin closure, and dressing placement.    ANESTHESIA:  General endotracheal with NITZA block    ESTIMATED BLOOD LOSS:   50 mL    FLUIDS:  Crystalloids.    SPECIMENS:  Subtotal gastrectomy.    DRAINS:  None.    COUNTS:  Correct.    COMPLICATIONS:  None.    FINDINGS: Small hiatal hernia.  Oozing near upper pole of spleen controlled with 20 mL of Floseal.      INDICATIONS:   Taylor Rivas is a 31 y.o. year old female with morbid obesity and associated comorbidities who presents for elective laparoscopic sleeve gastrectomy. The patient has has undergone our extensive preoperative education, teaching, and consent process. Everything is in order and they wish to proceed.         DESCRIPTION OF PROCEDURE:   The patient was brought to the operating room and placed supine upon the operating room table. SCDs were placed. The patient underwent uneventful general endotracheal anesthesia and bilateral NITZA blocks per the anesthesiology staff.  The patient received subcutaneous Lovenox and was given Ancef. The abdomen was prepped with ChloraPrep and draped in the usual sterile fashion. An Ioban was used as well.  Timeout was performed.     The peritoneal cavity was entered in the left midclavicular line using an 11 mm trocar utilizing an Optiview technique, and the abdomen was insufflated to a pressure of 15  mmHg with CO2 gas.  Exploratory laparoscopy revealed no evidence of injury from the entrance technique.   The gallbladder was absent.  The liver had a uniform capsule and was normal in size without evidence of gross hepatomegaly or fibrosis.     Remaining trocars were placed under direct visualization, including a 19 mm trocar a few centimeters below and to the right of the umbilicus and 5 mm trocars in the right and left lateral abdomen.   A Jamie liver retractor was placed through a small subxiphoid stab incision and the the left lobe of the liver was elevated.  The stomach was decompressed with the 38 Greenlandic suction bougie, which was then positioned in the antrum.  The greater curvature vessels were taken down with an articulating Enseal energy device beginning at the midpoint of the stomach and continued up to the angle of His, including the short gastrics. The left juliana was completely exposed and there was a small hiatal hernia here. The GE junction fat pad was elevated to make a clear landing zone for the stapler later. The greater curvature vessels were taken down with the Enseal extending distally within 3 cm of the pylorus. Filmy adhesions to the stomach were taken down posteriorly.       I incised the hernia sac from the left juliana of the diaphragm.  I incised the phrenoesophageal ligament with the Enseal. The pars flaccida was opened (there was no replaced hepatic vessel) and the right juliana was exposed.  I incised the hernia sac from the right juliana.  An instrument was passed under the esophagus at the base of the hiatus and brought easily out the other side.  A Penrose drain was placed around the esophagus for retraction.  The esophagus was mobilized into the abdomen 5 cm.  A posterior cruroplasty was performed with running 2-0 V-Loc nonabsorbable suture  The crura came together without tension and repair was photodocumented.  The Penrose was removed.      The stomach was marked in the 3 positions  using an ink-stained laparoscopic Kittner.  The 3 markings were at the angle of His, the incisura and antrum using 1 cm, 3 cm and 6 cm respectively.  The previously placed 38 Khmer balloon bougie was advanced into the distal antrum and the balloon insufflated with 15 cc of saline.  1 mg of IV glucagon was given to relax gastric smooth muscle.  The Titan stapler was positioned along the markings with the calibration balloon at the angularis and the stapler was closed.  The calibration bougie was desufflated and removed.  The 85% subtotal vertical sleeve gastrectomy was then performed with a single firing using the Titan stapler ([id number]).   The sleeve was performed such that it was uniform in size, no hourglassing or narrowing, especially at the angularis, and the final firing was done a centimeter away from the angle of His to hopefully avoid incorporating esophageal fibers.  The Titan stapler was removed.  The subtotal gastrectomy specimen was retrieved through the 19 mm trocar site incision, inspected, and sent unopened to pathology for permanent section.  The staple line of the sleeve gastrectomy revealed staples that were well-formed. The upper abdomen was flooded with saline, and endoscopy was performed.  The flexible endoscope was passed transorally down to the pylorus easily. The sleeve extended to within 6 cm proximal to the pylorus and was hemostatic. The sleeve was not narrow or twisted. There was no hiatal hernia or distal esophagitis. The rest of the esophagus was normal. The scope was removed. The leak test was normal.    I rescrubbed and suctioned the irrigation fluid from the upper abdomen, making sure it was dry. The staple line on the stomach had a few points of bleeding but was hemostatic after cautery.  The staple line was treated with 4 ml of aerosolized Tisseel fibrin glue.  There was oozing near the upper pole of the spleen, and this was controlled with a total of 20 mL of Floseal.  The liver  retractor was removed easily. The 19 mm port was removed under direct visualization and there was no bleeding. This incision was closed with 0-Vicryl transfascial suture using a suture passer under direct visualization in a horizontal mattress fashion. All other ports were removed and then replaced under direct visualization and all of these were hemostatic. The instruments were removed and the abdomen was desufflated. The ports were removed.  3-0 Monocryl plus was used to close skin incisions with interrupted sutures. Skin glue was placed. 20 mL of local anesthesia was instilled at the 19 mm port site.  The patient received 10 mg of IV Barhemsys at the end of the case.  The patient was awakened and taken to recovery in good condition, having tolerated the procedure well. All sponge, needle, and instrument counts were correct.

## 2022-08-04 NOTE — BRIEF OP NOTE
GASTRIC SLEEVE LAPAROSCOPIC, ESOPHAGOGASTRODUODENOSCOPY, HIATAL HERNIA REPAIR LAPAROSCOPIC  Progress Note    Taylor Rivas  8/4/2022    Pre-op Diagnosis:   Morbid obesity (HCC) [E66.01]       Post-Op Diagnosis Codes:     * Morbid obesity (HCC) [E66.01]    Procedure/CPT® Codes:  LA LAP, DEE RESTRICT PROC, LONGITUDINAL GASTRECTOMY [67936]  LA LAP,ESOPHAGOGAST FUNDOPLASTY [09969]  LA ESOPHAGOGASTRODUODENOSCOPY TRANSORAL DIAGNOSTIC [34149]      Procedure(s):  GASTRIC SLEEVE LAPAROSCOPIC  ESOPHAGOGASTRODUODENOSCOPY  HIATAL HERNIA REPAIR LAPAROSCOPIC    Surgeon(s):  Elle Stein MD    Anesthesia: General with Block    Staff:   Circulator: Katherine Valdovinos RN  Scrub Person: Junior Gonzales; Triny Dodd CSA         Estimated Blood Loss: 50 mL    Urine Voided: * No values recorded between 8/4/2022  9:18 AM and 8/4/2022 11:18 AM *    Specimens:                Specimens     ID Source Type Tests Collected By Collected At Frozen?    A Stomach Tissue · TISSUE EXAM, [facility] ([facility])   Katherine Valdovinos RN 8/4/22 1015 No    Description: SUB-TOTAL GASTRECTOMY    This specimen was not marked as sent.                Drains:   Ureteral Drain/Stent Right ureter 6 Fr. (Active)       Findings: Small hiatal hernia.  Oozing near upper pole of spleen controlled with 20 mL of Floseal.          Complications: None immediate.          Elle Stein MD     Date: 8/4/2022  Time: 11:40 EDT

## 2022-08-04 NOTE — NURSING NOTE
Pt. Resting in bed this present. Pt. On RA with O2 sats 92%. Pt. Ambulated halls 4 hours post op to nursing desk and back to room. Pain and nausea controlled with PRN medications. Pt. Utilizing IS and chewing gum. VSS. Pt. Voices no concerns at this time,

## 2022-08-04 NOTE — ANESTHESIA PROCEDURE NOTES
Peripheral Block      Patient reassessed immediately prior to procedure    Patient location during procedure: OR  Start time: 8/4/2022 9:27 AM  Stop time: 8/4/2022 9:31 AM  Reason for block: at surgeon's request and post-op pain management  Performed by  CRNA/CAA: Patel Centeno CRNA  Preanesthetic Checklist  Completed: patient identified, IV checked, site marked, risks and benefits discussed, surgical consent, monitors and equipment checked, pre-op evaluation and timeout performed  Prep:  Pt Position: supine  Sterile barriers:cap, gloves, sterile barriers and mask  Prep: ChloraPrep  Patient monitoring: blood pressure monitoring, continuous pulse oximetry and EKG  Procedure    Sedation: yes  Performed under: general  Guidance:ultrasound guided  Images:still images obtained, printed/placed on chart    Laterality:Bilateral  Block Type:TAP  Injection Technique:single-shot  Needle Type:short-bevel and echogenic  Needle Gauge:20 G  Resistance on Injection: none    Medications Used: ropivacaine (NAROPIN) injection 0.5 %, 20 mL      Medications  Preservative Free Saline:20ml  Comment:Block Injection:  LA dose divided between Right and Left block        Post Assessment  Injection Assessment: negative aspiration for heme, incremental injection and no paresthesia on injection  Patient Tolerance:comfortable throughout block  Complications:no  Additional Notes      Under Ultrasound guidance, a BBraun 4inch 360 degree needle was advanced with Normal Saline hydro dissection of tissue.  The Internal Oblique and Transversus Abdominus muscles where visualized.  At or before the aponeurosis of Internal Oblique, local anesthetic spread was visualized in the Transversus Abdominus Plane. Injection was made incrementally with aspiration every 5 mls.  There was no  intravascular injection,  injection pressure was normal, there was no neural injection, and the procedure was completed without difficulty.  Thank You.

## 2022-08-04 NOTE — ADDENDUM NOTE
Addendum  created 08/04/22 1157 by Estuardo Wells CRNA    Order list changed, Order sets accessed, Pharmacy for encounter modified

## 2022-08-04 NOTE — ANESTHESIA POSTPROCEDURE EVALUATION
Patient: Taylor Rivas    Procedure Summary     Date: 08/04/22 Room / Location: [facility] OR 1 /  [facility]    Anesthesia Start: 0919 Anesthesia Stop: 1130    Procedures:       GASTRIC SLEEVE LAPAROSCOPIC (N/A Abdomen)      ESOPHAGOGASTRODUODENOSCOPY (N/A )      HIATAL HERNIA REPAIR LAPAROSCOPIC (N/A Abdomen) Diagnosis:       Morbid obesity (HCC)      (Morbid obesity (HCC) [E66.01])    Surgeons: Elle Stein MD Provider: Estuardo Wells CRNA    Anesthesia Type: general with block ASA Status: 3          Anesthesia Type: general with block    Vitals  No vitals data found for the desired time range.          Post Anesthesia Care and Evaluation    Patient location during evaluation: PACU  Patient participation: complete - patient participated  Level of consciousness: awake and alert  Pain score: 2  Pain management: satisfactory to patient    Airway patency: patent  Anesthetic complications: No anesthetic complications  PONV Status: none  Cardiovascular status: acceptable and stable  Respiratory status: acceptable  Hydration status: acceptable    Comments: Vitals signs as noted in nursing documentation as per protocol.

## 2022-08-04 NOTE — ANESTHESIA PROCEDURE NOTES
Airway  Urgency: elective    Date/Time: 8/4/2022 9:26 AM  Airway not difficult    General Information and Staff    Patient location during procedure: OR  CRNA/CAA: Estuardo Wells CRNA    Indications and Patient Condition  Indications for airway management: airway protection    Preoxygenated: yes  MILS not maintained throughout  Mask difficulty assessment: 2 - vent by mask + OA or adjuvant +/- NMBA    Final Airway Details  Final airway type: endotracheal airway      Successful airway: ETT  Cuffed: yes   Successful intubation technique: direct laryngoscopy  Endotracheal tube insertion site: oral  Blade: Ambreen  Blade size: 3  ETT size (mm): 7.0  Cormack-Lehane Classification: grade I - full view of glottis  Placement verified by: chest auscultation and capnometry   Cuff volume (mL): 8  Measured from: lips  ETT/EBT  to lips (cm): 21  Number of attempts at approach: 1  Assessment: lips, teeth, and gum same as pre-op and atraumatic intubation    Additional Comments  Negative epigastric sounds, Breath sound equal bilaterally with symmetric chest rise and fall

## 2022-08-05 ENCOUNTER — READMISSION MANAGEMENT (OUTPATIENT)
Dept: CALL CENTER | Facility: HOSPITAL | Age: 32
End: 2022-08-05

## 2022-08-05 ENCOUNTER — APPOINTMENT (OUTPATIENT)
Dept: GENERAL RADIOLOGY | Facility: HOSPITAL | Age: 32
End: 2022-08-05

## 2022-08-05 VITALS
HEIGHT: 64 IN | HEART RATE: 73 BPM | DIASTOLIC BLOOD PRESSURE: 82 MMHG | BODY MASS INDEX: 45.62 KG/M2 | RESPIRATION RATE: 18 BRPM | OXYGEN SATURATION: 93 % | SYSTOLIC BLOOD PRESSURE: 126 MMHG | TEMPERATURE: 98.3 F | WEIGHT: 267.2 LBS

## 2022-08-05 LAB
ALBUMIN SERPL-MCNC: 3.5 G/DL (ref 3.5–5.2)
ALBUMIN/GLOB SERPL: 1.1 G/DL
ALP SERPL-CCNC: 51 U/L (ref 39–117)
ALT SERPL W P-5'-P-CCNC: 33 U/L (ref 1–33)
ANION GAP SERPL CALCULATED.3IONS-SCNC: 10.8 MMOL/L (ref 5–15)
AST SERPL-CCNC: 30 U/L (ref 1–32)
BASOPHILS # BLD AUTO: 0.06 10*3/MM3 (ref 0–0.2)
BASOPHILS NFR BLD AUTO: 0.4 % (ref 0–1.5)
BILIRUB SERPL-MCNC: 0.3 MG/DL (ref 0–1.2)
BUN SERPL-MCNC: 9 MG/DL (ref 6–20)
BUN/CREAT SERPL: 18 (ref 7–25)
CALCIUM SPEC-SCNC: 8.6 MG/DL (ref 8.6–10.5)
CHLORIDE SERPL-SCNC: 105 MMOL/L (ref 98–107)
CO2 SERPL-SCNC: 22.2 MMOL/L (ref 22–29)
CREAT SERPL-MCNC: 0.5 MG/DL (ref 0.57–1)
DEPRECATED RDW RBC AUTO: 40.6 FL (ref 37–54)
EGFRCR SERPLBLD CKD-EPI 2021: 128.8 ML/MIN/1.73
EOSINOPHIL # BLD AUTO: 0.03 10*3/MM3 (ref 0–0.4)
EOSINOPHIL NFR BLD AUTO: 0.2 % (ref 0.3–6.2)
ERYTHROCYTE [DISTWIDTH] IN BLOOD BY AUTOMATED COUNT: 13.4 % (ref 12.3–15.4)
GLOBULIN UR ELPH-MCNC: 3.1 GM/DL
GLUCOSE SERPL-MCNC: 89 MG/DL (ref 65–99)
HCT VFR BLD AUTO: 34.7 % (ref 34–46.6)
HGB BLD-MCNC: 11.7 G/DL (ref 12–15.9)
IMM GRANULOCYTES # BLD AUTO: 0.06 10*3/MM3 (ref 0–0.05)
IMM GRANULOCYTES NFR BLD AUTO: 0.4 % (ref 0–0.5)
IRON 24H UR-MRATE: 37 MCG/DL (ref 37–145)
LYMPHOCYTES # BLD AUTO: 3.53 10*3/MM3 (ref 0.7–3.1)
LYMPHOCYTES NFR BLD AUTO: 26.1 % (ref 19.6–45.3)
MCH RBC QN AUTO: 28 PG (ref 26.6–33)
MCHC RBC AUTO-ENTMCNC: 33.7 G/DL (ref 31.5–35.7)
MCV RBC AUTO: 83 FL (ref 79–97)
MONOCYTES # BLD AUTO: 0.81 10*3/MM3 (ref 0.1–0.9)
MONOCYTES NFR BLD AUTO: 6 % (ref 5–12)
NEUTROPHILS NFR BLD AUTO: 66.9 % (ref 42.7–76)
NEUTROPHILS NFR BLD AUTO: 9.04 10*3/MM3 (ref 1.7–7)
NRBC BLD AUTO-RTO: 0 /100 WBC (ref 0–0.2)
PLATELET # BLD AUTO: 387 10*3/MM3 (ref 140–450)
PMV BLD AUTO: 9.4 FL (ref 6–12)
POTASSIUM SERPL-SCNC: 4 MMOL/L (ref 3.5–5.2)
PROT SERPL-MCNC: 6.6 G/DL (ref 6–8.5)
RBC # BLD AUTO: 4.18 10*6/MM3 (ref 3.77–5.28)
SODIUM SERPL-SCNC: 138 MMOL/L (ref 136–145)
WBC NRBC COR # BLD: 13.53 10*3/MM3 (ref 3.4–10.8)

## 2022-08-05 PROCEDURE — 74240 X-RAY XM UPR GI TRC 1CNTRST: CPT

## 2022-08-05 PROCEDURE — 0 DIATRIZOATE MEGLUMINE & SODIUM PER 1 ML: Performed by: SURGERY

## 2022-08-05 PROCEDURE — 0 POTASSIUM CHLORIDE PER 2 MEQ: Performed by: SURGERY

## 2022-08-05 PROCEDURE — 83540 ASSAY OF IRON: CPT | Performed by: SURGERY

## 2022-08-05 PROCEDURE — 80053 COMPREHEN METABOLIC PANEL: CPT | Performed by: SURGERY

## 2022-08-05 PROCEDURE — 99024 POSTOP FOLLOW-UP VISIT: CPT | Performed by: SURGERY

## 2022-08-05 PROCEDURE — 25010000002 CYANOCOBALAMIN PER 1000 MCG: Performed by: SURGERY

## 2022-08-05 PROCEDURE — 85025 COMPLETE CBC W/AUTO DIFF WBC: CPT | Performed by: SURGERY

## 2022-08-05 PROCEDURE — 25010000002 ENOXAPARIN PER 10 MG: Performed by: SURGERY

## 2022-08-05 PROCEDURE — 25010000002 THIAMINE PER 100 MG: Performed by: SURGERY

## 2022-08-05 PROCEDURE — 25010000002 NA FERRIC GLUC CPLX PER 12.5 MG: Performed by: SURGERY

## 2022-08-05 PROCEDURE — 0 CEFAZOLIN SODIUM-DEXTROSE 2-3 GM-%(50ML) RECONSTITUTED SOLUTION: Performed by: SURGERY

## 2022-08-05 RX ORDER — OXYCODONE HYDROCHLORIDE 5 MG/1
5 TABLET ORAL EVERY 6 HOURS PRN
Qty: 10 TABLET | Refills: 0 | Status: SHIPPED | OUTPATIENT
Start: 2022-08-05 | End: 2022-10-11

## 2022-08-05 RX ADMIN — OXYCODONE 5 MG: 5 TABLET ORAL at 08:27

## 2022-08-05 RX ADMIN — HYDROMORPHONE HYDROCHLORIDE 2 MG: 2 TABLET ORAL at 14:02

## 2022-08-05 RX ADMIN — SODIUM CHLORIDE, POTASSIUM CHLORIDE, SODIUM LACTATE AND CALCIUM CHLORIDE 150 ML/HR: 600; 310; 30; 20 INJECTION, SOLUTION INTRAVENOUS at 05:13

## 2022-08-05 RX ADMIN — DIATRIZOATE MEGLUMINE AND DIATRIZOATE SODIUM 90 ML: 660; 100 LIQUID ORAL; RECTAL at 12:51

## 2022-08-05 RX ADMIN — GABAPENTIN 100 MG: 100 CAPSULE ORAL at 08:27

## 2022-08-05 RX ADMIN — SIMETHICONE 80 MG: 80 TABLET, CHEWABLE ORAL at 14:02

## 2022-08-05 RX ADMIN — CYANOCOBALAMIN 1000 MCG: 1000 INJECTION, SOLUTION INTRAMUSCULAR at 08:27

## 2022-08-05 RX ADMIN — THIAMINE HYDROCHLORIDE 100 ML/HR: 100 INJECTION, SOLUTION INTRAMUSCULAR; INTRAVENOUS at 00:06

## 2022-08-05 RX ADMIN — CEFAZOLIN SODIUM 2 G: 2 SOLUTION INTRAVENOUS at 02:20

## 2022-08-05 RX ADMIN — ENOXAPARIN SODIUM 40 MG: 40 INJECTION SUBCUTANEOUS at 08:28

## 2022-08-05 RX ADMIN — Medication 10 ML: at 08:28

## 2022-08-05 RX ADMIN — SODIUM CHLORIDE 125 MG: 9 INJECTION, SOLUTION INTRAVENOUS at 09:42

## 2022-08-05 RX ADMIN — ACETAMINOPHEN 1000 MG: 500 TABLET ORAL at 05:13

## 2022-08-05 RX ADMIN — ESCITALOPRAM OXALATE 20 MG: 20 TABLET ORAL at 08:27

## 2022-08-05 RX ADMIN — POTASSIUM CHLORIDE AND SODIUM CHLORIDE 100 ML/HR: 450; 150 INJECTION, SOLUTION INTRAVENOUS at 09:42

## 2022-08-05 RX ADMIN — GABAPENTIN 100 MG: 100 CAPSULE ORAL at 15:12

## 2022-08-05 RX ADMIN — BUSPIRONE HYDROCHLORIDE 10 MG: 10 TABLET ORAL at 08:27

## 2022-08-05 RX ADMIN — ACETAMINOPHEN 1000 MG: 500 TABLET ORAL at 13:33

## 2022-08-05 RX ADMIN — BUSPIRONE HYDROCHLORIDE 10 MG: 10 TABLET ORAL at 15:12

## 2022-08-05 NOTE — CASE MANAGEMENT/SOCIAL WORK
Case Management Discharge Note                Selected Continued Care - Admitted Since 8/4/2022     Destination    No services have been selected for the patient.              Durable Medical Equipment    No services have been selected for the patient.              Dialysis/Infusion    No services have been selected for the patient.              Home Medical Care    No services have been selected for the patient.              Therapy    No services have been selected for the patient.              Community Resources    No services have been selected for the patient.              Community & DME    No services have been selected for the patient.                Selected Continued Care - Episodes Includes selections from active Coordinated Care Management episodes    Rising Risk Care Management Episode start date: 6/9/2022   There are no active outsourced providers for this episode.               Transportation Services  Private: Car    Final Discharge Disposition Code: 01 - home or self-care

## 2022-08-05 NOTE — PROGRESS NOTES
"Bariatric Surgery Progress Note:      Chief Complaint:  POD #1    Subjective     Interval History:  Doing well.  No complaints.  Pain controlled.  Denies N/V.  No fevers.  Ambulating.  Voiding.  IS 1500.      Objective     Vital Signs  Blood pressure 130/80, pulse 84, temperature 98.6 °F (37 °C), temperature source Oral, resp. rate 18, height 162.6 cm (64\"), weight 121 kg (267 lb 3.2 oz), last menstrual period 08/03/2022, SpO2 92 %, not currently breastfeeding.      Intake/Output Summary (Last 24 hours) at 8/5/2022 0821  Last data filed at 8/5/2022 0615  Gross per 24 hour   Intake 5197.13 ml   Output 20 ml   Net 5177.13 ml       Physical Exam:  General: Alert, NAD  Abdomen: soft, appropriate, incisions okay  Extremities: warm, (+) SCDs       Labs:  Lab Results (last 24 hours)     Procedure Component Value Units Date/Time    Iron [[id number]]  (Normal) Collected: 08/05/22 0623    Specimen: Blood Updated: 08/05/22 0714     Iron 37 mcg/dL     Comprehensive Metabolic Panel [[id number]]  (Abnormal) Collected: 08/05/22 0623    Specimen: Blood Updated: 08/05/22 0714     Glucose 89 mg/dL      BUN 9 mg/dL      Creatinine 0.50 mg/dL      Sodium 138 mmol/L      Potassium 4.0 mmol/L      Chloride 105 mmol/L      CO2 22.2 mmol/L      Calcium 8.6 mg/dL      Total Protein 6.6 g/dL      Albumin 3.50 g/dL      ALT (SGPT) 33 U/L      AST (SGOT) 30 U/L      Alkaline Phosphatase 51 U/L      Total Bilirubin 0.3 mg/dL      Globulin 3.1 gm/dL      A/G Ratio 1.1 g/dL      BUN/Creatinine Ratio 18.0     Anion Gap 10.8 mmol/L      eGFR 128.8 mL/min/1.73      Comment: National Kidney Foundation and American Society of Nephrology (ASN) Task Force recommended calculation based on the Chronic Kidney Disease Epidemiology Collaboration (CKD-EPI) equation refit without adjustment for race.       Narrative:      GFR Normal >60  Chronic Kidney Disease <60  Kidney Failure <15      CBC & Differential [[id number]]  (Abnormal) Collected: 08/05/22 0623    " Specimen: Blood Updated: 08/05/22 0659    Narrative:      The following orders were created for panel order CBC & Differential.  Procedure                               Abnormality         Status                     ---------                               -----------         ------                     CBC Auto Differential[[id number]]        Abnormal            Final result                 Please view results for these tests on the individual orders.    CBC Auto Differential [[id number]]  (Abnormal) Collected: 08/05/22 0623    Specimen: Blood Updated: 08/05/22 0659     WBC 13.53 10*3/mm3      RBC 4.18 10*6/mm3      Hemoglobin 11.7 g/dL      Hematocrit 34.7 %      MCV 83.0 fL      MCH 28.0 pg      MCHC 33.7 g/dL      RDW 13.4 %      RDW-SD 40.6 fl      MPV 9.4 fL      Platelets 387 10*3/mm3      Neutrophil % 66.9 %      Lymphocyte % 26.1 %      Monocyte % 6.0 %      Eosinophil % 0.2 %      Basophil % 0.4 %      Immature Grans % 0.4 %      Neutrophils, Absolute 9.04 10*3/mm3      Lymphocytes, Absolute 3.53 10*3/mm3      Monocytes, Absolute 0.81 10*3/mm3      Eosinophils, Absolute 0.03 10*3/mm3      Basophils, Absolute 0.06 10*3/mm3      Immature Grans, Absolute 0.06 10*3/mm3      nRBC 0.0 /100 WBC     TISSUE EXAM, [facility] ([facility]) [[id number]] Collected: 08/04/22 1015    Specimen: Tissue from Stomach Updated: 08/04/22 1410    POC Pregnancy, Urine [[id number]] Collected: 08/04/22 0829    Specimen: Urine Updated: 08/04/22 0830     HCG, Urine, QL Negative     Lot Number [id number]     Internal Positive Control Positive     Internal Negative Control Negative     Expiration Date 10-31-23            Assessment & Plan     POD # 1 s/p LSG/HHR.    Doing well.  UGI normal post-sleeve, images and report reviewed.  IV iron given for low Fe without evidence of bleeding on Lovenox.  Patient feels well and has met discharge criteria.  Will discharge home with follow up in 1 week with PA.  Rx given for oxy. She already has Zofran  and omeprazole 40 mg daily at home.   Discharge instructions reviewed with patient and all questions answered.

## 2022-08-05 NOTE — PLAN OF CARE
Goal Outcome Evaluation:  Plan of Care Reviewed With: patient        Progress: improving  Outcome Evaluation: Pt. to be discharged home with family providing transportation. VSS. Pt. voices no concerns at this time.

## 2022-08-05 NOTE — PLAN OF CARE
Goal Outcome Evaluation:           Progress: improving  Outcome Evaluation: VSS.  Oxygenation maintained on room air.  Patient alert and oriented x4.  Complaints of pain controlled with ordered PRN medication (see MAR).  Abdominal lap sites monitored.   Intake and output monitored and documented.  Incentive use encouraged.  Patient ambulated during shift.  IV fluids (see MAR) administered as ordered.  Continued cardiac and pulse oximetry monitoring per order.  NPO, ice chips and sips of water diet as ordered   Chewing gum use per order.  No acute event noted during shift.  Will continue to monitor patient.

## 2022-08-05 NOTE — PROGRESS NOTES
Patient: Taylor Rivas  Procedure(s):  GASTRIC SLEEVE LAPAROSCOPIC  ESOPHAGOGASTRODUODENOSCOPY  HIATAL HERNIA REPAIR LAPAROSCOPIC  Anesthesia type: general with block    Patient location: [facility] Surgical Floor  Last vitals:   Vitals:    08/05/22 0703   BP: 130/80   Pulse: 84   Resp: 18   Temp: 98.6 °F (37 °C)   SpO2: 92%     Level of consciousness: awake, alert and oriented    Post-anesthesia pain: adequate analgesia  Airway patency: patent  Respiratory: unassisted  Cardiovascular: stable and blood pressure at baseline  Hydration: euvolemic    Anesthetic complications: no

## 2022-08-05 NOTE — OUTREACH NOTE
Prep Survey    Flowsheet Row Responses   [facility] facility patient discharged from? [facility]   Is LACE score < 7 ? Yes   Emergency Room discharge w/ pulse ox? No   Eligibility [facility]   Date of Admission 08/04/22   Date of Discharge 08/05/22   Discharge Disposition Home or Self Care   Discharge diagnosis GASTRIC SLEEVE LAPAROSCOPIC   Does the patient have one of the following disease processes/diagnoses(primary or secondary)? General Surgery   Does the patient have Home health ordered? No   Is there a DME ordered? No   Prep survey completed? Yes          APURVA HOLLINS - Registered Nurse

## 2022-08-05 NOTE — CASE MANAGEMENT/SOCIAL WORK
Case Management Discharge Note    Pt's spouse transported pt home            Selected Continued Care - Discharged on 8/5/2022 Admission date: 8/4/2022 - Discharge disposition: Home or Self Care    Destination    No services have been selected for the patient.                      Transportation Services  Private: Car    Final Discharge Disposition Code: 01 - home or self-care

## 2022-08-08 ENCOUNTER — TRANSITIONAL CARE MANAGEMENT TELEPHONE ENCOUNTER (OUTPATIENT)
Dept: CALL CENTER | Facility: HOSPITAL | Age: 32
End: 2022-08-08

## 2022-08-08 LAB — REF LAB TEST METHOD: NORMAL

## 2022-08-08 NOTE — OUTREACH NOTE
Call Center TCM Note    Flowsheet Row Responses   [facility] patient discharged from? [facility]   Does the patient have one of the following disease processes/diagnoses(primary or secondary)? General Surgery   TCM attempt successful? No   Unsuccessful attempts Attempt 1          Saúl Heredia RN    8/8/2022, 16:07 EDT

## 2022-08-08 NOTE — OUTREACH NOTE
Call Center TCM Note    Flowsheet Row Responses   [facility] patient discharged from? [facility]   Does the patient have one of the following disease processes/diagnoses(primary or secondary)? General Surgery   TCM attempt successful? No   Unsuccessful attempts Attempt 2          Saúl Heredia RN    8/8/2022, 16:23 EDT

## 2022-08-09 ENCOUNTER — TRANSITIONAL CARE MANAGEMENT TELEPHONE ENCOUNTER (OUTPATIENT)
Dept: CALL CENTER | Facility: HOSPITAL | Age: 32
End: 2022-08-09

## 2022-08-09 NOTE — OUTREACH NOTE
Call Center TCM Note    Flowsheet Row Responses   [facility] patient discharged from? [facility]   Does the patient have one of the following disease processes/diagnoses(primary or secondary)? General Surgery   TCM attempt successful? Yes   Call start time 1414   Call end time 1416   Discharge diagnosis GASTRIC SLEEVE LAPAROSCOPIC   Meds reviewed with patient/caregiver? Yes   Is the patient having any side effects they believe may be caused by any medication additions or changes? No   Does the patient have all medications related to this admission filled (includes all antibiotics, pain medications, etc.) Yes   Is the patient taking all medications as directed (includes completed medication regime)? Yes   Does the patient have a follow up appointment scheduled with their surgeon? Yes   Has the patient kept scheduled appointments due by today? N/A   Comments DECLINED SCHEDULING FOLLOW UP WITH PCP DURING THIS CALL.    Has home health visited the patient within 72 hours of discharge? N/A   Psychosocial issues? No   Did the patient receive a copy of their discharge instructions? Yes   Nursing interventions Reviewed instructions with patient   What is the patient's perception of their health status since discharge? Improving   Nursing interventions Nurse provided patient education   Is the patient /caregiver able to teach back basic post-op care? Drive as instructed by MD in discharge instructions, Take showers only when approved by MD-sponge bathe until then, No tub bath, swimming, or hot tub until instructed by MD, Keep incision areas clean,dry and protected, Lifting as instructed by MD in discharge instructions   Is the patient/caregiver able to teach back signs and symptoms of incisional infection? Increased redness, swelling or pain at the incisonal site, Increased drainage or bleeding, Incisional warmth, Pus or odor from incision, Fever   Is the patient/caregiver able to teach back steps to recovery at home? Set  small, achievable goals for return to baseline health, Rest and rebuild strength, gradually increase activity, Make a list of questions for surgeon's appointment   If the patient is a current smoker, are they able to teach back resources for cessation? Not a smoker   Is the patient/caregiver able to teach back the hierarchy of who to call/visit for symptoms/problems? PCP, Specialist, Home health nurse, Urgent Care, ED, 911 Yes   TCM call completed? Yes          Mary Engle LPN    8/9/2022, 14:21 EDT

## 2022-08-12 ENCOUNTER — LAB (OUTPATIENT)
Dept: LAB | Facility: HOSPITAL | Age: 32
End: 2022-08-12

## 2022-08-12 DIAGNOSIS — Z98.890 POST-OPERATIVE STATE: Primary | ICD-10-CM

## 2022-08-12 DIAGNOSIS — Z98.890 POST-OPERATIVE STATE: ICD-10-CM

## 2022-08-12 LAB
ALBUMIN SERPL-MCNC: 4.1 G/DL (ref 3.5–5.2)
ALBUMIN/GLOB SERPL: 1 G/DL
ALP SERPL-CCNC: 78 U/L (ref 39–117)
ALT SERPL W P-5'-P-CCNC: 40 U/L (ref 1–33)
ANION GAP SERPL CALCULATED.3IONS-SCNC: 13 MMOL/L (ref 5–15)
AST SERPL-CCNC: 29 U/L (ref 1–32)
BILIRUB SERPL-MCNC: 0.3 MG/DL (ref 0–1.2)
BUN SERPL-MCNC: 12 MG/DL (ref 6–20)
BUN/CREAT SERPL: 16.7 (ref 7–25)
CALCIUM SPEC-SCNC: 9.8 MG/DL (ref 8.6–10.5)
CHLORIDE SERPL-SCNC: 102 MMOL/L (ref 98–107)
CO2 SERPL-SCNC: 25 MMOL/L (ref 22–29)
CREAT SERPL-MCNC: 0.72 MG/DL (ref 0.57–1)
EGFRCR SERPLBLD CKD-EPI 2021: 114.8 ML/MIN/1.73
ERYTHROCYTE [DISTWIDTH] IN BLOOD BY AUTOMATED COUNT: 14.3 % (ref 12.3–15.4)
GLOBULIN UR ELPH-MCNC: 4.3 GM/DL
GLUCOSE SERPL-MCNC: 73 MG/DL (ref 65–99)
HCT VFR BLD AUTO: 42.2 % (ref 34–46.6)
HGB BLD-MCNC: 13.3 G/DL (ref 12–15.9)
IRON 24H UR-MRATE: 21 MCG/DL (ref 37–145)
LYMPHOCYTES # BLD AUTO: 4.5 10*3/MM3 (ref 0.7–3.1)
LYMPHOCYTES NFR BLD AUTO: 29.5 % (ref 19.6–45.3)
MCH RBC QN AUTO: 26 PG (ref 26.6–33)
MCHC RBC AUTO-ENTMCNC: 31.4 G/DL (ref 31.5–35.7)
MCV RBC AUTO: 82.7 FL (ref 79–97)
MONOCYTES # BLD AUTO: 0.7 10*3/MM3 (ref 0.1–0.9)
MONOCYTES NFR BLD AUTO: 4.3 % (ref 5–12)
NEUTROPHILS NFR BLD AUTO: 10.1 10*3/MM3 (ref 1.7–7)
NEUTROPHILS NFR BLD AUTO: 66.2 % (ref 42.7–76)
PLATELET # BLD AUTO: 495 10*3/MM3 (ref 140–450)
PMV BLD AUTO: 7.4 FL (ref 6–12)
POTASSIUM SERPL-SCNC: 4.4 MMOL/L (ref 3.5–5.2)
PROT SERPL-MCNC: 8.4 G/DL (ref 6–8.5)
RBC # BLD AUTO: 5.11 10*6/MM3 (ref 3.77–5.28)
SODIUM SERPL-SCNC: 140 MMOL/L (ref 136–145)
WBC NRBC COR # BLD: 15.3 10*3/MM3 (ref 3.4–10.8)

## 2022-08-12 PROCEDURE — 85025 COMPLETE CBC W/AUTO DIFF WBC: CPT

## 2022-08-12 PROCEDURE — 36415 COLL VENOUS BLD VENIPUNCTURE: CPT

## 2022-08-12 PROCEDURE — 80053 COMPREHEN METABOLIC PANEL: CPT

## 2022-08-12 PROCEDURE — 83540 ASSAY OF IRON: CPT

## 2022-08-15 ENCOUNTER — TELEPHONE (OUTPATIENT)
Dept: BARIATRICS/WEIGHT MGMT | Facility: CLINIC | Age: 32
End: 2022-08-15

## 2022-08-15 NOTE — TELEPHONE ENCOUNTER
Pt called and was curious as to what she needs to do with her Iron levels. She stated that she received an infusion in the hospital prior to d/c, but she is still feeling dizzy and SOB. Please advise. Thanks!

## 2022-08-16 ENCOUNTER — OFFICE VISIT (OUTPATIENT)
Dept: BARIATRICS/WEIGHT MGMT | Facility: CLINIC | Age: 32
End: 2022-08-16

## 2022-08-16 VITALS
OXYGEN SATURATION: 97 % | TEMPERATURE: 97.8 F | SYSTOLIC BLOOD PRESSURE: 142 MMHG | HEART RATE: 103 BPM | HEIGHT: 64 IN | DIASTOLIC BLOOD PRESSURE: 86 MMHG | BODY MASS INDEX: 41.66 KG/M2 | RESPIRATION RATE: 18 BRPM | WEIGHT: 244 LBS

## 2022-08-16 DIAGNOSIS — Z98.84 STATUS POST BARIATRIC SURGERY: Primary | ICD-10-CM

## 2022-08-16 DIAGNOSIS — E66.01 OBESITY, CLASS III, BMI 40-49.9 (MORBID OBESITY): ICD-10-CM

## 2022-08-16 PROCEDURE — 99024 POSTOP FOLLOW-UP VISIT: CPT | Performed by: PHYSICIAN ASSISTANT

## 2022-08-16 NOTE — PROGRESS NOTES
[facility] Bariatric Surgery  [address]  [address]  [facility] [address] [phone]      Patient Name:  Taylor Rivas.  :  1990      Reason for Visit:  POD #12    HPI:  Taylor Rivas is a 31 y.o. female s/p LSG/ HHR by  on 22    FINDINGS: Small hiatal hernia.  Oozing near upper pole of spleen controlled with 20 mL of Floseal.     D/c POD#1- IV iron given, rx for oxy, has zofran and omeprazole at home.     Doing well.  Called with c/o dizziness and SOA, advised OV for eval. She had to cancel initial 1 weeks f/u due to class exam and had labs with PCP. Says since the weekend, she has felt completely better. Thinks she was calorie deficient and symptoms resolved with increased intake. No issues/concerns. Denies dysphagia, reflux, nausea, vomiting, abdominal pain, pulmonary issues and fevers.  Tolerating diet progression - on stage 2.  Getting 70-100g prot/day.  Drinking 64+ fluid oz/day.  Taking MVI, B12, B1, Calcium, Vit D and Patches: iron with C.  On Omeprazole .  Holding ASA , NSAIDs , Tramadol, Hormones, Diuretics , Steroids and Immunologics and tobacco use/ exposure.  Ambulating- back to school/ work.     Presurgery weight: 255 pounds.  Today's weight is 111 kg (244 lb) pounds, today's  Body mass index is 41.88 kg/m²., and@ weight loss since surgery is 11 pounds.       DIAGNOSIS:   STOMACH, PARTIAL GASTRECTOMY:   Portion of stomach with no significant histopathologic abnormalities.   No H. pylori-like organisms identified.   No intestinal metaplasia or dysplasia identified.     Past Medical History:   Diagnosis Date   • Anxiety and depression    • BMI 40.0-44.9, adult (HCC)    • Body piercing     ears   • GERD (gastroesophageal reflux disease)    • Heartburn     will take antacid; happens weekly. No EGD, has had + h pylori antibody test   • Helicobacter pylori antibody positive 2018    treated with prevpac   • History of COVID-19 2022    no  hospitalization; symptomatic   • History of pneumonia 2016    no hospitalization but seen in ER   • History of UTI    • Hypertriglyceridemia    • Lumbar herniated disc     L5; PRN Nsaids   • Nephrolithiasis 06/21/2019    Patient reported ER visits but that she has not require surgical intervention in the past   • Other hyperlipidemia    • PCOS (polycystic ovarian syndrome)     metformin daily   • PONV (postoperative nausea and vomiting)     moderate   • S/P laparoscopic cholecystectomy 2016    gallstones     Past Surgical History:   Procedure Laterality Date   • CYSTOSCOPY      Done in the office setting, no anesthesia   • ENDOSCOPY     • ENDOSCOPY N/A 8/4/2022    Procedure: ESOPHAGOGASTRODUODENOSCOPY;  Surgeon: Elle Stein MD;  Location: [facility] OR;  Service: Bariatric;  Laterality: N/A;   • GASTRIC SLEEVE LAPAROSCOPIC N/A 8/4/2022    Procedure: GASTRIC SLEEVE LAPAROSCOPIC;  Surgeon: Elle Stein MD;  Location: [facility] OR;  Service: Bariatric;  Laterality: N/A;   • HIATAL HERNIA REPAIR N/A 8/4/2022    Procedure: HIATAL HERNIA REPAIR LAPAROSCOPIC;  Surgeon: Elle Stein MD;  Location: [facility] OR;  Service: Bariatric;  Laterality: N/A;   • LAPAROSCOPIC CHOLECYSTECTOMY  2016    gallstones; no complications   • URETEROSCOPY LASER LITHOTRIPSY WITH STENT INSERTION Right 07/09/2019    Procedure: CYSTOSCOPY, URETEROSCOPY LASER LITHOTRIPSY WITH BASKET EXTRACTION OF STONE FRAGMENTS, RETROGRADE PYELOGRAM AND STENT INSERTION;  Surgeon: Michael Valerio MD;  Location: [facility] OR;  Service: Urology   • WISDOM TOOTH EXTRACTION       Outpatient Medications Marked as Taking for the 8/16/22 encounter (Office Visit) with Nanci Boykin PA-C   Medication Sig Dispense Refill   • busPIRone (BUSPAR) 10 MG tablet Take 1 tablet by mouth 3 (Three) Times a Day. 270 tablet 2   • escitalopram (LEXAPRO) 20 MG tablet Take 1 tablet by mouth Daily. 90 tablet 3   • Omega-3 1000 MG capsule Take 1 capsule by mouth  "Daily.     • omeprazole (priLOSEC) 40 MG capsule Take 1 capsule by mouth Daily 90 capsule 3   • Prenatal Vit-Fe Fumarate-FA (PRENATAL VITAMIN PO) Take 1 tablet by mouth Daily.     • vitamin D3 125 MCG (5000 UT) capsule capsule Take 5,000 Units by mouth Daily.       No Known Allergies    Social History     Socioeconomic History   • Marital status: Single   • Number of children: 0   • Highest education level: Associate degree: academic program   Tobacco Use   • Smoking status: Former Smoker     Packs/day: 1.00     Years: 6.00     Pack years: 6.00     Types: Cigarettes     Start date: 2/15/2008     Quit date: 2/15/2014     Years since quittin.5   • Smokeless tobacco: Never Used   Vaping Use   • Vaping Use: Former   Substance and Sexual Activity   • Alcohol use: Not Currently     Comment: Occasional, no history of abuse   • Drug use: No   • Sexual activity: Defer     Partners: Male       /86 (BP Location: Left arm, Patient Position: Sitting)   Pulse 103   Temp 97.8 °F (36.6 °C)   Resp 18   Ht 162.6 cm (64\")   Wt 111 kg (244 lb)   LMP 2022   SpO2 97%   BMI 41.88 kg/m²   Physical Exam  Constitutional:       Appearance: She is well-developed.   HENT:      Head: Normocephalic and atraumatic.   Cardiovascular:      Rate and Rhythm: Normal rate and regular rhythm.      Comments: HR normal on recheck  Pulmonary:      Effort: Pulmonary effort is normal.      Breath sounds: Normal breath sounds.   Abdominal:      General: Bowel sounds are normal.      Palpations: Abdomen is soft.      Comments: Incisions healing well   Skin:     General: Skin is warm and dry.   Neurological:      Mental Status: She is alert.   Psychiatric:         Behavior: Behavior normal.           Assessment:   POD #12  s/p LSG/ HHR by  on 22    ICD-10-CM ICD-9-CM   1. Status post bariatric surgery  Z98.84 V45.86   2. Obesity, Class III, BMI 40-49.9 (morbid obesity) ([facility])  E66.01 278.01           Plan:  Doing well. " Continue to advance diet per manual.  Increase protein intake to 100g/day.  Increase exercise/activity as tolerated.  Reviewed lifting restrictions, nothing >25 lbs x 2 more weeks.  Continue vitamins.  Continue PPI.  Continue to avoid ASA/NSAIDs/tramadol/tobacco x 6 weeks postop, steroids x 8 weeks postop.  Call w/ problems/concerns.    Class 3 Severe Obesity (BMI >=40). Obesity-related health conditions include the following: as above. Obesity is worsening. BMI is is above average; BMI management plan is completed. We discussed low calorie, low carb based diet program, portion control and increasing exercise.        The patient was instructed to follow up in 3 weeks, sooner if needed.

## 2022-09-07 RX ORDER — FLUCONAZOLE 150 MG/1
150 TABLET ORAL
Qty: 2 TABLET | Refills: 5 | Status: SHIPPED | OUTPATIENT
Start: 2022-09-07 | End: 2022-09-13

## 2022-09-20 ENCOUNTER — OFFICE VISIT (OUTPATIENT)
Dept: BARIATRICS/WEIGHT MGMT | Facility: CLINIC | Age: 32
End: 2022-09-20

## 2022-09-20 VITALS
OXYGEN SATURATION: 96 % | WEIGHT: 229.5 LBS | DIASTOLIC BLOOD PRESSURE: 86 MMHG | HEIGHT: 64 IN | RESPIRATION RATE: 12 BRPM | BODY MASS INDEX: 39.18 KG/M2 | HEART RATE: 82 BPM | TEMPERATURE: 97.7 F | SYSTOLIC BLOOD PRESSURE: 126 MMHG

## 2022-09-20 DIAGNOSIS — K91.2 POSTGASTRECTOMY MALABSORPTION: ICD-10-CM

## 2022-09-20 DIAGNOSIS — Z13.21 MALNUTRITION SCREEN: ICD-10-CM

## 2022-09-20 DIAGNOSIS — Z90.3 POSTGASTRECTOMY MALABSORPTION: ICD-10-CM

## 2022-09-20 DIAGNOSIS — R53.83 FATIGUE, UNSPECIFIED TYPE: Primary | ICD-10-CM

## 2022-09-20 DIAGNOSIS — E55.9 HYPOVITAMINOSIS D: ICD-10-CM

## 2022-09-20 DIAGNOSIS — Z13.0 SCREENING FOR IRON DEFICIENCY ANEMIA: ICD-10-CM

## 2022-09-20 PROCEDURE — 99024 POSTOP FOLLOW-UP VISIT: CPT | Performed by: PHYSICIAN ASSISTANT

## 2022-09-20 NOTE — PROGRESS NOTES
[facility] Bariatric Surgery  [address]  [address]  [facility] [address]  [phone]      Patient Name:  Taylor Rivas  :  1990      Date of Visit: 2022      Reason for Visit:  6 weeks postop    HPI:  Taylor Rivas is a 31 y.o. female s/p LSG/ HHR by  on 22    Doing well.  She is pleased with her results so far.  Only complaint is she is having some diarrhea after drinking protein shakes.  She has noticed the scales have not moved much in the last week.  She is feeling hungry between meals.  No other issues/concerns. Denies dysphagia, reflux, nausea, vomiting, abdominal pain, hair loss, memory loss, vision changes and numbness/tingling.  Tolerating diet progression - as moved through all stages.  Getting 70g prot/day.  Drinking 64 fluid oz/day.  Taking MVI, B12, B1, Calcium, Vit D, Vit C and Patches: iron.  On Omeprazole .  Still holding ASA , NSAIDs , Tramadol, Hormones, Diuretics , Steroids and Immunologics.  Physical activity: going to the gym 4 days a week; walking 2 days/week.     Presurgery weight:  255 pounds. Today's weight is 104 kg (229 lb 8 oz) pounds, today's Body mass index is 39.39 kg/m²., and weight loss since surgery is 26 pounds.       Past Medical History:   Diagnosis Date   • Anxiety and depression    • BMI 40.0-44.9, adult (HCC)    • Body piercing     ears   • GERD (gastroesophageal reflux disease)    • Heartburn     will take antacid; happens weekly. No EGD, has had + h pylori antibody test   • Helicobacter pylori antibody positive 2018    treated with prevpac   • History of COVID-19 2022    no hospitalization; symptomatic   • History of pneumonia 2016    no hospitalization but seen in ER   • History of UTI    • Hypertriglyceridemia    • Lumbar herniated disc     L5; PRN Nsaids   • Nephrolithiasis 2019    Patient reported ER visits but that she has not require surgical intervention in the past   • Other  hyperlipidemia    • PCOS (polycystic ovarian syndrome)     metformin daily   • PONV (postoperative nausea and vomiting)     moderate   • S/P laparoscopic cholecystectomy 2016    gallstones     Past Surgical History:   Procedure Laterality Date   • CYSTOSCOPY      Done in the office setting, no anesthesia   • ENDOSCOPY     • ENDOSCOPY N/A 8/4/2022    Procedure: ESOPHAGOGASTRODUODENOSCOPY;  Surgeon: Elle Stein MD;  Location: [facility] OR;  Service: Bariatric;  Laterality: N/A;   • GASTRIC SLEEVE LAPAROSCOPIC N/A 8/4/2022    Procedure: GASTRIC SLEEVE LAPAROSCOPIC;  Surgeon: Elle Stein MD;  Location: [facility] OR;  Service: Bariatric;  Laterality: N/A;   • HIATAL HERNIA REPAIR N/A 8/4/2022    Procedure: HIATAL HERNIA REPAIR LAPAROSCOPIC;  Surgeon: Elle Stein MD;  Location: [facility] OR;  Service: Bariatric;  Laterality: N/A;   • LAPAROSCOPIC CHOLECYSTECTOMY  2016    gallstones; no complications   • URETEROSCOPY LASER LITHOTRIPSY WITH STENT INSERTION Right 07/09/2019    Procedure: CYSTOSCOPY, URETEROSCOPY LASER LITHOTRIPSY WITH BASKET EXTRACTION OF STONE FRAGMENTS, RETROGRADE PYELOGRAM AND STENT INSERTION;  Surgeon: Michael Valerio MD;  Location: [facility] OR;  Service: Urology   • WISDOM TOOTH EXTRACTION       Outpatient Medications Marked as Taking for the 9/20/22 encounter (Office Visit) with Donna Martinez PA   Medication Sig Dispense Refill   • busPIRone (BUSPAR) 10 MG tablet Take 1 tablet by mouth 3 (Three) Times a Day. 270 tablet 2   • escitalopram (LEXAPRO) 20 MG tablet Take 1 tablet by mouth Daily. 90 tablet 3   • Omega-3 1000 MG capsule Take 1 capsule by mouth Daily.     • ondansetron (ZOFRAN) 4 MG tablet Take 1 tablet by mouth Every 6 Hours As Needed for Nausea or Vomiting. 20 tablet 5   • Prenatal Vit-Fe Fumarate-FA (PRENATAL VITAMIN PO) Take 1 tablet by mouth Daily.     • vitamin D3 125 MCG (5000 UT) capsule capsule Take 5,000 Units by mouth Daily.       No Known  "Allergies    Social History     Socioeconomic History   • Marital status: Single   • Number of children: 0   • Highest education level: Associate degree: academic program   Tobacco Use   • Smoking status: Former Smoker     Packs/day: 1.00     Years: 6.00     Pack years: 6.00     Types: Cigarettes     Start date: 2/15/2008     Quit date: 2/15/2014     Years since quittin.6   • Smokeless tobacco: Never Used   Vaping Use   • Vaping Use: Former   Substance and Sexual Activity   • Alcohol use: Not Currently     Comment: Occasional, no history of abuse   • Drug use: No   • Sexual activity: Defer     Partners: Male     Social History     Social History Narrative    Lives in [place], [place]. 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 .        /86 (BP Location: Left arm, Patient Position: Sitting, Cuff Size: Adult)   Pulse 82   Temp 97.7 °F (36.5 °C) (Infrared)   Resp 12   Ht 162.6 cm (64\")   Wt 104 kg (229 lb 8 oz)   SpO2 96%   BMI 39.39 kg/m²     Physical Exam  Constitutional:       Appearance: She is obese.   HENT:      Head: Normocephalic and atraumatic.   Cardiovascular:      Rate and Rhythm: Normal rate and regular rhythm.   Pulmonary:      Effort: Pulmonary effort is normal.      Breath sounds: Normal breath sounds.   Abdominal:      General: Bowel sounds are normal. There is no distension.      Palpations: Abdomen is soft. There is no mass.      Tenderness: There is no abdominal tenderness.      Comments: Lap incisions well-healed   Skin:     General: Skin is warm and dry.   Neurological:      General: No focal deficit present.      Mental Status: She is alert and oriented to person, place, and time.   Psychiatric:         Mood and Affect: Mood normal.         Behavior: Behavior normal.           Assessment:  6 weeks s/p LSG/ HHR by  on 22    ICD-10-CM ICD-9-CM   1. Fatigue, unspecified type  R53.83 780.79   2. Malnutrition screen  Z13.21 V77.2   3. Hypovitaminosis D  E55.9 268.9   4. " Screening for iron deficiency anemia  Z13.0 V78.0   5. Postgastrectomy malabsorption  K91.2 579.3    Z90.3        Class 3 Severe Obesity (BMI >=40). Obesity-related health conditions include the following: as above. Obesity is improving with treatment. BMI is is above average; BMI management plan is completed. We discussed low calorie, low carb based diet program, portion control and increasing exercise.      Plan:  Doing well.  Have recommended she incorporate a snack between meals given the amount that she is working out and feeling hunger.  I have encouraged her to focus on making protein the priority.  Continue to advance diet per manual.  Continue protein >70g/day.  Encouraged good food choices - high protein, low carb.  Continue routine physical activity.  Routine bariatric labs ordered.  Continue vitamins w/ adjustments pending lab results.  Continue to avoid steroids x 8 weeks postop.   Call w/ problems/concerns.    The patient was instructed to follow up in 6 weeks for 3-month follow-up, sooner if needed.

## 2022-09-25 LAB
25(OH)D3+25(OH)D2 SERPL-MCNC: 34.5 NG/ML (ref 30–100)
ALBUMIN SERPL-MCNC: 4.4 G/DL (ref 3.8–4.8)
ALBUMIN/GLOB SERPL: 1.4 {RATIO} (ref 1.2–2.2)
ALP SERPL-CCNC: 79 IU/L (ref 44–121)
ALT SERPL-CCNC: 40 IU/L (ref 0–32)
AST SERPL-CCNC: 20 IU/L (ref 0–40)
BASOPHILS # BLD AUTO: 0.1 X10E3/UL (ref 0–0.2)
BASOPHILS NFR BLD AUTO: 1 %
BILIRUB SERPL-MCNC: 0.4 MG/DL (ref 0–1.2)
BUN SERPL-MCNC: 11 MG/DL (ref 6–20)
BUN/CREAT SERPL: 17 (ref 9–23)
CALCIUM SERPL-MCNC: 9.7 MG/DL (ref 8.7–10.2)
CHLORIDE SERPL-SCNC: 103 MMOL/L (ref 96–106)
CO2 SERPL-SCNC: 23 MMOL/L (ref 20–29)
CREAT SERPL-MCNC: 0.65 MG/DL (ref 0.57–1)
EGFRCR SERPLBLD CKD-EPI 2021: 121 ML/MIN/1.73
EOSINOPHIL # BLD AUTO: 0.2 X10E3/UL (ref 0–0.4)
EOSINOPHIL NFR BLD AUTO: 1 %
ERYTHROCYTE [DISTWIDTH] IN BLOOD BY AUTOMATED COUNT: 14.4 % (ref 11.7–15.4)
FERRITIN SERPL-MCNC: 109 NG/ML (ref 15–150)
FOLATE SERPL-MCNC: 10.2 NG/ML
GLOBULIN SER CALC-MCNC: 3.1 G/DL (ref 1.5–4.5)
GLUCOSE SERPL-MCNC: 75 MG/DL (ref 65–99)
HCT VFR BLD AUTO: 41.3 % (ref 34–46.6)
HGB BLD-MCNC: 13.9 G/DL (ref 11.1–15.9)
IMM GRANULOCYTES # BLD AUTO: 0 X10E3/UL (ref 0–0.1)
IMM GRANULOCYTES NFR BLD AUTO: 0 %
IRON SERPL-MCNC: 27 UG/DL (ref 27–159)
LYMPHOCYTES # BLD AUTO: 4.3 X10E3/UL (ref 0.7–3.1)
LYMPHOCYTES NFR BLD AUTO: 38 %
MCH RBC QN AUTO: 28.6 PG (ref 26.6–33)
MCHC RBC AUTO-ENTMCNC: 33.7 G/DL (ref 31.5–35.7)
MCV RBC AUTO: 85 FL (ref 79–97)
METHYLMALONATE SERPL-SCNC: 89 NMOL/L (ref 0–378)
MONOCYTES # BLD AUTO: 0.6 X10E3/UL (ref 0.1–0.9)
MONOCYTES NFR BLD AUTO: 5 %
NEUTROPHILS # BLD AUTO: 6.2 X10E3/UL (ref 1.4–7)
NEUTROPHILS NFR BLD AUTO: 55 %
PLATELET # BLD AUTO: 379 X10E3/UL (ref 150–450)
POTASSIUM SERPL-SCNC: 4.6 MMOL/L (ref 3.5–5.2)
PREALB SERPL-MCNC: 18 MG/DL (ref 14–35)
PROT SERPL-MCNC: 7.5 G/DL (ref 6–8.5)
RBC # BLD AUTO: 4.86 X10E6/UL (ref 3.77–5.28)
SODIUM SERPL-SCNC: 142 MMOL/L (ref 134–144)
VIT B1 BLD-SCNC: 119.2 NMOL/L (ref 66.5–200)
WBC # BLD AUTO: 11.2 X10E3/UL (ref 3.4–10.8)

## 2022-10-07 RX ORDER — PROPRANOLOL HYDROCHLORIDE 40 MG/1
40 TABLET ORAL DAILY PRN
Qty: 30 TABLET | Refills: 0 | Status: SHIPPED | OUTPATIENT
Start: 2022-10-07

## 2022-10-11 ENCOUNTER — OFFICE VISIT (OUTPATIENT)
Dept: PSYCHIATRY | Facility: CLINIC | Age: 32
End: 2022-10-11

## 2022-10-11 DIAGNOSIS — G47.9 SLEEP DISTURBANCE: ICD-10-CM

## 2022-10-11 DIAGNOSIS — F41.1 GENERALIZED ANXIETY DISORDER: Primary | ICD-10-CM

## 2022-10-11 DIAGNOSIS — N20.0 NEPHROLITHIASIS: Primary | ICD-10-CM

## 2022-10-11 DIAGNOSIS — F41.0 PANIC DISORDER: ICD-10-CM

## 2022-10-11 DIAGNOSIS — N20.0 NEPHROLITHIASIS: ICD-10-CM

## 2022-10-11 LAB
BACTERIA UR QL AUTO: ABNORMAL /HPF
BILIRUB UR QL STRIP: NEGATIVE
CLARITY UR: ABNORMAL
COD CRY URNS QL: ABNORMAL /HPF
COLOR UR: ABNORMAL
GLUCOSE UR STRIP-MCNC: NEGATIVE MG/DL
HGB UR QL STRIP.AUTO: NEGATIVE
HYALINE CASTS UR QL AUTO: ABNORMAL /LPF
KETONES UR QL STRIP: NEGATIVE
LEUKOCYTE ESTERASE UR QL STRIP.AUTO: NEGATIVE
MUCOUS THREADS URNS QL MICRO: ABNORMAL /HPF
NITRITE UR QL STRIP: NEGATIVE
PH UR STRIP.AUTO: 5.5 [PH] (ref 5–8)
PROT UR QL STRIP: ABNORMAL
RBC # UR STRIP: ABNORMAL /HPF
REF LAB TEST METHOD: ABNORMAL
SP GR UR STRIP: 1.03 (ref 1–1.03)
SQUAMOUS #/AREA URNS HPF: ABNORMAL /HPF
UROBILINOGEN UR QL STRIP: ABNORMAL
WBC # UR STRIP: ABNORMAL /HPF

## 2022-10-11 PROCEDURE — 87086 URINE CULTURE/COLONY COUNT: CPT | Performed by: OBSTETRICS & GYNECOLOGY

## 2022-10-11 PROCEDURE — 99212 OFFICE O/P EST SF 10 MIN: CPT | Performed by: NURSE PRACTITIONER

## 2022-10-11 PROCEDURE — 81001 URINALYSIS AUTO W/SCOPE: CPT | Performed by: OBSTETRICS & GYNECOLOGY

## 2022-10-11 RX ORDER — PHENAZOPYRIDINE HYDROCHLORIDE 200 MG/1
200 TABLET, FILM COATED ORAL 3 TIMES DAILY PRN
Qty: 6 TABLET | Refills: 0 | Status: SHIPPED | OUTPATIENT
Start: 2022-10-11 | End: 2022-10-13

## 2022-10-11 NOTE — PROGRESS NOTES
Subjective   Taylor Rivas is a 31 y.o. female who is here today for medication management follow up. in person with covid precautions taken.     TIME IN:1245  TIME OUT:109    I spent 19 minutes in patient care: reviewing records prior to the visit, assessing the patient, entering orders and documentation.        Chief Complaint: JON, panic, sleep disturbance    History of Present Illness Patient presents by herself reporting she is doing so much better both physically and emotionally since she had bariatric surgery. She reports anxiety about a 3-4 most days or lower and denies panic. She stopped the buspar because it caused her mouth to go dry and had mouth ulcers. Mouth condition resolved off buspar. She still gets performance anxiety with tests in school or talking to large group. Heart rate increases and sweats, flushes. She began propranolol through another provider and that has helped resolve physical symptoms of anxiety. Still taking the escitalopram 20 mg daily. Sleeping well at night. Has more energy and is more active with exercise and cleaning her house and working and going to school. 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 She and  doing well. He got into therapy. She has begun having menstrual cycles and ovulating. They will look at fertility again next year.  Denies adverse effects from medications.   (Scales based on 0 - 10 with 10 being the worst)     The following portions of the patient's history were reviewed and updated as appropriate: allergies, current medications, past family history, past medical history, past social history, past surgical history and problem list.    Review of Systems  A 14 point review of systems was performed and is negative except as noted above.    Objective   Physical Exam  not currently breastfeeding.    No Known Allergies    Current Medications:   Current Outpatient Medications   Medication Sig Dispense Refill   • busPIRone (BUSPAR) 10 MG tablet  Take 1 tablet by mouth 3 (Three) Times a Day. 270 tablet 2   • escitalopram (LEXAPRO) 20 MG tablet Take 1 tablet by mouth Daily. 90 tablet 3   • Omega-3 1000 MG capsule Take 1 capsule by mouth Daily.     • ondansetron (ZOFRAN) 4 MG tablet Take 1 tablet by mouth Every 6 Hours As Needed for Nausea or Vomiting. 20 tablet 5   • oxyCODONE (Roxicodone) 5 MG immediate release tablet Take 1 tablet by mouth Every 6 (Six) Hours As Needed for Moderate Pain . 10 tablet 0   • phenazopyridine (Pyridium) 200 MG tablet Take 1 tablet by mouth 3 (Three) Times a Day As Needed for Bladder Spasms for up to 2 days. 6 tablet 0   • Prenatal Vit-Fe Fumarate-FA (PRENATAL VITAMIN PO) Take 1 tablet by mouth Daily.     • propranolol (INDERAL) 40 MG tablet Take 1 tablet by mouth Daily As Needed (Anxiety). 30 tablet 0   • vitamin D3 125 MCG (5000 UT) capsule capsule Take 5,000 Units by mouth Daily.       No current facility-administered medications for this visit.          Appearance: WNL  Hygiene: good  Cooperation:  Cooperative  Eye Contact:direct    Psychomotor Behavior:  denies psychomotor agitation/retardation, No EPS, No motor tics  Mood:  within normal limits  Affect:  Congruent   Hopelessness: Denies  Speech:  Normal  Thought Process:  Linear  Thought Content:  Normal  Concentration: Normal   Suicidal: denies  Homicidal:  None  Hallucinations:  None  Delusion:  None  Memory:  Intact  Orientation:  Person, Place, Time and Situation  Reliability:  good  Insight:  good  Judgement: good  Impulse Control: good  Estimated Intelligence: above average range    LIZETTE REVIEWED NO RED FLAGS    Assessment & Plan   Diagnoses and all orders for this visit:    1. Generalized anxiety disorder (Primary)    2. Panic disorder    3. Sleep disturbance          PLAN: DC buspar had stopped on own for dry mouth  Cont escitalopram 20 mg po one QD for JON  Propranolol 40 mg daily through other provider for anxiety    We discussed risks, benefits, and side effects  of the above medications and the patient was agreeable with the plan. Patient was educated on the importance of compliance with treatment and follow-up appointments.         Counseled patient regarding multimodal approach with encouragement of healthy nutrition, healthy sleep, regular physical mobility, social involvement,  and medication compliance.     Assisted patient in identifying risk factors which would indicate the need for higher level of care including thoughts to harm self or others and/or self-harming behavior and encouraged patient to contact this office, call 911, or present to the nearest emergency room should any of these events occur. Discussed crisis intervention services and means to access.  Patient adamantly and convincingly denies current suicidal or homicidal ideation or perceptual disturbance.    Treatment Plan: stabilize mood, patient will stay out of psychiatric hospital and be at optimal level of functioning with therapy and take all medication as prescribed. Patient verbalized  understanding and agreement to plan.    Instructed to call for questions or concerns and return early if necessary.     Greater than 50% time was spent in coordination of care, and counseling the patient regarding current assessment, symptoms, plan of care going forward, supportive therapy.  Answered any questions patient had regarding medications and plan of care.    Return in about 6 months (around 4/11/2023).

## 2022-10-12 LAB — BACTERIA SPEC AEROBE CULT: NO GROWTH

## 2022-10-20 DIAGNOSIS — T14.8XXA SKIN WOUND FROM SURGICAL INCISION: Primary | ICD-10-CM

## 2022-10-20 PROCEDURE — 87070 CULTURE OTHR SPECIMN AEROBIC: CPT | Performed by: OBSTETRICS & GYNECOLOGY

## 2022-10-20 PROCEDURE — 87205 SMEAR GRAM STAIN: CPT | Performed by: OBSTETRICS & GYNECOLOGY

## 2022-10-22 LAB
BACTERIA SPEC AEROBE CULT: NORMAL
GRAM STN SPEC: NORMAL
GRAM STN SPEC: NORMAL

## 2022-12-13 DIAGNOSIS — B37.31 VAGINAL YEAST INFECTION: Primary | ICD-10-CM

## 2022-12-13 RX ORDER — FLUCONAZOLE 150 MG/1
150 TABLET ORAL DAILY
Qty: 14 TABLET | Refills: 0 | Status: SHIPPED | OUTPATIENT
Start: 2022-12-13

## 2022-12-22 RX ORDER — METHYLPREDNISOLONE 4 MG/1
TABLET ORAL
Qty: 21 TABLET | Refills: 0 | Status: SHIPPED | OUTPATIENT
Start: 2022-12-22

## 2023-03-21 RX ORDER — ESCITALOPRAM OXALATE 20 MG/1
20 TABLET ORAL DAILY
Qty: 90 TABLET | Refills: 4 | Status: SHIPPED | OUTPATIENT
Start: 2023-03-21

## 2024-01-05 ENCOUNTER — DOCUMENTATION (OUTPATIENT)
Dept: GYNECOLOGIC ONCOLOGY | Facility: CLINIC | Age: 34
End: 2024-01-05
Payer: COMMERCIAL

## 2024-01-05 RX ORDER — ESCITALOPRAM OXALATE 20 MG/1
20 TABLET ORAL DAILY
Qty: 90 TABLET | Refills: 4 | Status: SHIPPED | OUTPATIENT
Start: 2024-01-05

## 2024-01-05 RX ORDER — PROPRANOLOL HYDROCHLORIDE 40 MG/1
40 TABLET ORAL DAILY PRN
Qty: 30 TABLET | Refills: 0 | Status: SHIPPED | OUTPATIENT
Start: 2024-01-05

## 2024-12-04 ENCOUNTER — HOSPITAL ENCOUNTER (EMERGENCY)
Facility: HOSPITAL | Age: 34
Discharge: HOME | End: 2024-12-05
Attending: EMERGENCY MEDICINE

## 2024-12-04 ENCOUNTER — APPOINTMENT (OUTPATIENT)
Dept: GENERAL RADIOLOGY | Facility: HOSPITAL | Age: 34
End: 2024-12-04
Attending: EMERGENCY MEDICINE

## 2024-12-04 DIAGNOSIS — E86.0 DEHYDRATION: ICD-10-CM

## 2024-12-04 DIAGNOSIS — R11.2 NAUSEA AND VOMITING, UNSPECIFIED VOMITING TYPE: ICD-10-CM

## 2024-12-04 DIAGNOSIS — B34.9 VIRAL ILLNESS: Primary | ICD-10-CM

## 2024-12-04 LAB
ALBUMIN SERPL-MCNC: 3.9 G/DL (ref 3.4–4.8)
ALBUMIN/GLOB SERPL: 1.1 {RATIO} (ref 0.8–2)
ALP SERPL-CCNC: 86 U/L (ref 25–100)
ALT SERPL-CCNC: 12 U/L (ref 4–36)
ANION GAP SERPL CALCULATED.3IONS-SCNC: 12 MMOL/L (ref 3–16)
AST SERPL-CCNC: 15 U/L (ref 8–33)
BACTERIA URNS QL MICRO: ABNORMAL /HPF
BASOPHILS # BLD: 0.1 K/UL (ref 0–0.1)
BASOPHILS NFR BLD: 0.3 %
BILIRUB SERPL-MCNC: 0.6 MG/DL (ref 0.3–1.2)
BILIRUB UR QL STRIP.AUTO: ABNORMAL
BUN SERPL-MCNC: 9 MG/DL (ref 6–20)
CALCIUM SERPL-MCNC: 9.1 MG/DL (ref 8.5–10.5)
CHLORIDE SERPL-SCNC: 100 MMOL/L (ref 98–107)
CLARITY UR: CLEAR
CO2 SERPL-SCNC: 24 MMOL/L (ref 20–30)
COLOR UR: YELLOW
CREAT SERPL-MCNC: 0.6 MG/DL (ref 0.4–1.2)
EBV VCA AB SER QL: NEGATIVE
EOSINOPHIL # BLD: 0 K/UL (ref 0–0.4)
EOSINOPHIL NFR BLD: 0 %
EPI CELLS #/AREA URNS HPF: ABNORMAL /HPF (ref 0–5)
ERYTHROCYTE [DISTWIDTH] IN BLOOD BY AUTOMATED COUNT: 12.1 % (ref 11–16)
FLUAV AG NPH QL: NEGATIVE
FLUBV AG NPH QL: NEGATIVE
GFR SERPLBLD CREATININE-BSD FMLA CKD-EPI: >90 ML/MIN/{1.73_M2}
GLOBULIN SER CALC-MCNC: 3.7 G/DL
GLUCOSE SERPL-MCNC: 110 MG/DL (ref 74–106)
GLUCOSE UR STRIP.AUTO-MCNC: NEGATIVE MG/DL
HCG UR QL: NEGATIVE
HCT VFR BLD AUTO: 38.2 % (ref 37–47)
HGB BLD-MCNC: 12.9 G/DL (ref 11.5–16.5)
HGB UR QL STRIP.AUTO: NEGATIVE
IMM GRANULOCYTES # BLD: 0.1 K/UL
IMM GRANULOCYTES NFR BLD: 0.5 % (ref 0–5)
KETONES UR STRIP.AUTO-MCNC: 15 MG/DL
LACTATE BLDV-SCNC: 2.5 MMOL/L (ref 0.4–2)
LEUKOCYTE ESTERASE UR QL STRIP.AUTO: ABNORMAL
LYMPHOCYTES # BLD: 0.5 K/UL (ref 1.5–4)
LYMPHOCYTES NFR BLD: 2.7 %
MAGNESIUM SERPL-MCNC: 1.7 MG/DL (ref 1.7–2.4)
MCH RBC QN AUTO: 29.8 PG (ref 27–32)
MCHC RBC AUTO-ENTMCNC: 33.8 G/DL (ref 31–35)
MCV RBC AUTO: 88.2 FL (ref 80–100)
MONOCYTES # BLD: 0.5 K/UL (ref 0.2–0.8)
MONOCYTES NFR BLD: 2.6 %
NEUTROPHILS # BLD: 18.5 K/UL (ref 2–7.5)
NEUTS SEG NFR BLD: 93.9 %
NITRITE UR QL STRIP.AUTO: NEGATIVE
PH UR STRIP.AUTO: 5.5 [PH] (ref 5–8)
PLATELET # BLD AUTO: 374 K/UL (ref 150–400)
PMV BLD AUTO: 9.1 FL (ref 6–10)
POTASSIUM SERPL-SCNC: 3.5 MMOL/L (ref 3.4–5.1)
PROCALCITONIN SERPL IA-MCNC: 0.03 NG/ML (ref 0–0.15)
PROT SERPL-MCNC: 7.6 G/DL (ref 6.4–8.3)
PROT UR STRIP.AUTO-MCNC: ABNORMAL MG/DL
RBC # BLD AUTO: 4.33 M/UL (ref 3.8–5.8)
RBC #/AREA URNS HPF: ABNORMAL /HPF (ref 0–4)
S PYO AG THROAT QL: NEGATIVE
SARS-COV-2 RDRP RESP QL NAA+PROBE: NOT DETECTED
SODIUM SERPL-SCNC: 136 MMOL/L (ref 136–145)
SP GR UR STRIP.AUTO: >=1.03 (ref 1–1.03)
UA COMPLETE W REFLEX CULTURE PNL UR: ABNORMAL
UA DIPSTICK W REFLEX MICRO PNL UR: YES
URN SPEC COLLECT METH UR: ABNORMAL
UROBILINOGEN UR STRIP-ACNC: 0.2 E.U./DL
WBC # BLD AUTO: 19.7 K/UL (ref 4–11)
WBC #/AREA URNS HPF: ABNORMAL /HPF (ref 0–5)

## 2024-12-04 PROCEDURE — 84703 CHORIONIC GONADOTROPIN ASSAY: CPT

## 2024-12-04 PROCEDURE — 2580000003 HC RX 258: Performed by: EMERGENCY MEDICINE

## 2024-12-04 PROCEDURE — 96361 HYDRATE IV INFUSION ADD-ON: CPT

## 2024-12-04 PROCEDURE — 36415 COLL VENOUS BLD VENIPUNCTURE: CPT

## 2024-12-04 PROCEDURE — 96374 THER/PROPH/DIAG INJ IV PUSH: CPT

## 2024-12-04 PROCEDURE — 85025 COMPLETE CBC W/AUTO DIFF WBC: CPT

## 2024-12-04 PROCEDURE — 86308 HETEROPHILE ANTIBODY SCREEN: CPT

## 2024-12-04 PROCEDURE — 6360000002 HC RX W HCPCS: Performed by: EMERGENCY MEDICINE

## 2024-12-04 PROCEDURE — 87804 INFLUENZA ASSAY W/OPTIC: CPT

## 2024-12-04 PROCEDURE — 87880 STREP A ASSAY W/OPTIC: CPT

## 2024-12-04 PROCEDURE — 71046 X-RAY EXAM CHEST 2 VIEWS: CPT

## 2024-12-04 PROCEDURE — 87635 SARS-COV-2 COVID-19 AMP PRB: CPT

## 2024-12-04 PROCEDURE — 83735 ASSAY OF MAGNESIUM: CPT

## 2024-12-04 PROCEDURE — 87040 BLOOD CULTURE FOR BACTERIA: CPT

## 2024-12-04 PROCEDURE — 80053 COMPREHEN METABOLIC PANEL: CPT

## 2024-12-04 PROCEDURE — 96375 TX/PRO/DX INJ NEW DRUG ADDON: CPT

## 2024-12-04 PROCEDURE — 99284 EMERGENCY DEPT VISIT MOD MDM: CPT

## 2024-12-04 PROCEDURE — 81001 URINALYSIS AUTO W/SCOPE: CPT

## 2024-12-04 PROCEDURE — 83605 ASSAY OF LACTIC ACID: CPT

## 2024-12-04 PROCEDURE — 84145 PROCALCITONIN (PCT): CPT

## 2024-12-04 RX ORDER — 0.9 % SODIUM CHLORIDE 0.9 %
1000 INTRAVENOUS SOLUTION INTRAVENOUS ONCE
Status: COMPLETED | OUTPATIENT
Start: 2024-12-04 | End: 2024-12-05

## 2024-12-04 RX ORDER — ONDANSETRON 2 MG/ML
4 INJECTION INTRAMUSCULAR; INTRAVENOUS ONCE
Status: COMPLETED | OUTPATIENT
Start: 2024-12-04 | End: 2024-12-04

## 2024-12-04 RX ORDER — KETOROLAC TROMETHAMINE 30 MG/ML
30 INJECTION, SOLUTION INTRAMUSCULAR; INTRAVENOUS ONCE
Status: COMPLETED | OUTPATIENT
Start: 2024-12-04 | End: 2024-12-04

## 2024-12-04 RX ADMIN — SODIUM CHLORIDE 1000 ML: 9 INJECTION, SOLUTION INTRAVENOUS at 23:05

## 2024-12-04 RX ADMIN — ONDANSETRON 4 MG: 2 INJECTION, SOLUTION INTRAMUSCULAR; INTRAVENOUS at 20:51

## 2024-12-04 RX ADMIN — SODIUM CHLORIDE 1000 ML: 9 INJECTION, SOLUTION INTRAVENOUS at 20:51

## 2024-12-04 RX ADMIN — KETOROLAC TROMETHAMINE 30 MG: 30 INJECTION, SOLUTION INTRAMUSCULAR; INTRAVENOUS at 22:35

## 2024-12-04 ASSESSMENT — LIFESTYLE VARIABLES
HOW OFTEN DO YOU HAVE A DRINK CONTAINING ALCOHOL: NEVER
HOW MANY STANDARD DRINKS CONTAINING ALCOHOL DO YOU HAVE ON A TYPICAL DAY: PATIENT DOES NOT DRINK

## 2024-12-04 ASSESSMENT — PAIN SCALES - WONG BAKER: WONGBAKER_NUMERICALRESPONSE: NO HURT

## 2024-12-05 VITALS
RESPIRATION RATE: 18 BRPM | OXYGEN SATURATION: 98 % | BODY MASS INDEX: 24.75 KG/M2 | HEART RATE: 79 BPM | TEMPERATURE: 98.5 F | SYSTOLIC BLOOD PRESSURE: 129 MMHG | WEIGHT: 145 LBS | DIASTOLIC BLOOD PRESSURE: 66 MMHG | HEIGHT: 64 IN

## 2024-12-05 RX ORDER — ONDANSETRON 4 MG/1
4 TABLET, FILM COATED ORAL 4 TIMES DAILY PRN
Qty: 20 TABLET | Refills: 0 | Status: SHIPPED | OUTPATIENT
Start: 2024-12-05 | End: 2024-12-10

## 2024-12-05 ASSESSMENT — ENCOUNTER SYMPTOMS
EYE REDNESS: 0
DIARRHEA: 0
SINUS PRESSURE: 0
CHEST TIGHTNESS: 0
NAUSEA: 1
VOMITING: 1
SHORTNESS OF BREATH: 0
PHOTOPHOBIA: 0
WHEEZING: 0
EYE PAIN: 0
SORE THROAT: 0
RHINORRHEA: 0
BACK PAIN: 0
ABDOMINAL PAIN: 0
STRIDOR: 0

## 2024-12-05 NOTE — DISCHARGE INSTRUCTIONS
Please take Zofran ODT 30 minutes prior to any meal, restart your diet starting with clear liquids and advance it as tolerated to a soft diet.     Take OTC Motrin and/or Tylenol as needed for pain control, try Chloraseptic mouth spray /Cepacol lozenges for your sore throat.    If any new/acute symptoms or worsening of current position please go to the closest urgent care or emergency room for prompt reevaluation

## 2024-12-05 NOTE — ED NOTES
Pt left ED ambulatory with belongings at this time. Pt and  verbalized understanding of discharge instructions and prescription.

## 2024-12-05 NOTE — ED PROVIDER NOTES
[facility]  EMERGENCY DEPARTMENT ENCOUNTER        Pt Name: Dary Olivares  MRN: [id number]  Birthdate 1990  Date of evaluation: 12/4/2024  Provider: Tam Garcia MD  PCP: Elaina Oakes APRN - NP  Note Started: 12:33 AM EST 12/5/24    CHIEF COMPLAINT       Chief Complaint   Patient presents with    Abdominal Pain       HISTORY OF PRESENT ILLNESS: 1 or more Elements     History from : Patient    Limitations to history : None    Dary Olivares is a 33 y.o. female who presents to the emergency room with 1 week of nausea, vomiting, low-grade fever, states that she was unable to hold down any solid food or liquids since running.  Patient's highest temperature at home was 100.7 Fahrenheit degrees earlier today.  Patient complains with congestion, runny nose, sore throat.  Patient states that her vomitus is after coughing efforts and is typically green in color.  Denies any recent travel, denies recent exposure to sick contacts.  Patient is s/p gastric sleeve procedure, denies any abdominal pain.  No dysuria.    Nursing Notes were all reviewed and agreed with or any disagreements were addressed in the HPI.    REVIEW OF SYSTEMS :      Review of Systems   Constitutional:  Negative for appetite change, chills, diaphoresis, fatigue, fever and unexpected weight change.   HENT:  Negative for dental problem, ear discharge, ear pain, hearing loss, mouth sores, nosebleeds, rhinorrhea, sinus pressure, sneezing and sore throat.    Eyes:  Negative for photophobia, pain and redness.   Respiratory:  Negative for chest tightness, shortness of breath, wheezing and stridor.    Cardiovascular:  Negative for chest pain and leg swelling.   Gastrointestinal:  Positive for nausea and vomiting. Negative for abdominal pain and diarrhea.   Endocrine: Negative for cold intolerance and heat intolerance.   Genitourinary:  Negative for difficulty urinating.   Musculoskeletal:  Negative for

## 2024-12-06 LAB
BACTERIA BLD CULT ORG #2: NORMAL
BACTERIA BLD CULT: NORMAL

## 2024-12-09 LAB
BACTERIA BLD CULT ORG #2: NORMAL
BACTERIA BLD CULT: NORMAL

## (undated) DEVICE — PK BARIATRIC 10

## (undated) DEVICE — ENDOPATH XCEL BLADELESS TROCARS WITH STABILITY SLEEVES: Brand: ENDOPATH XCEL

## (undated) DEVICE — TBG FLUID WARM ST SNGL

## (undated) DEVICE — FLTR PLUMEPORT LAP W/CONN STRL

## (undated) DEVICE — SYS CLS PORTSITE CT CLOSESURE 5AND10/12

## (undated) DEVICE — FIBR LASR HOLMIUM SLIMLINE 200 DFL 550U SMOTH TP 1P/U

## (undated) DEVICE — SPNG ENDO BEDSIDE TUB ENZYM

## (undated) DEVICE — SOL IRR NACL 0.9PCT 3000ML

## (undated) DEVICE — ADAPT UROLOK

## (undated) DEVICE — GLV SURG SENSICARE LT W/ALOE PF LF 7 STRL

## (undated) DEVICE — GLV SURG SENSICARE W/ALOE PF LF 6.5 STRL

## (undated) DEVICE — BG TRNSPRT SCOPEVALET RED

## (undated) DEVICE — GLV SURG DERMASSURE GRN LF PF 7.0

## (undated) DEVICE — MARKR SKIN W/RULR

## (undated) DEVICE — CATH URETRL AP 18F

## (undated) DEVICE — ANTIBACTERIAL VIOLET BRAIDED (POLYGLACTIN 910), SYNTHETIC ABSORBABLE SUTURE: Brand: COATED VICRYL

## (undated) DEVICE — RICH CYSTO: Brand: MEDLINE INDUSTRIES, INC.

## (undated) DEVICE — UNDRPD COMFRT GLD DRYPAD 36X57IN

## (undated) DEVICE — [HIGH FLOW INSUFFLATOR,  DO NOT USE IF PACKAGE IS DAMAGED,  KEEP DRY,  KEEP AWAY FROM SUNLIGHT,  PROTECT FROM HEAT AND RADIOACTIVE SOURCES.]: Brand: PNEUMOSURE

## (undated) DEVICE — GOWN,PREVENTION PLUS,LARGE,STERILE: Brand: MEDLINE

## (undated) DEVICE — SPNG VERSALON 4X4 4PLY NONSTRL LF BG/200

## (undated) DEVICE — SOL NACL 0.9PCT 1000ML

## (undated) DEVICE — APPL HEMOS FOR DELIVERY FLOSEAL

## (undated) DEVICE — DUAL LUMEN URETERAL CATHETER

## (undated) DEVICE — SCRB SURG BACTOSHIELD CHG 4PCT 4OZ

## (undated) DEVICE — GLW STD STR 3CM .035X150CM

## (undated) DEVICE — Device: Brand: STANDARD BOUGIE, 38FR

## (undated) DEVICE — ENDOPATH XCEL UNIVERSAL TROCAR STABLILITY SLEEVES: Brand: ENDOPATH XCEL

## (undated) DEVICE — SUT MONOCRYL PLS ANTIB UND 3/0  PS1 27IN

## (undated) DEVICE — THE BITE BLOCK MAXI, LATEX FREE STRAP IS USED TO PROTECT THE ENDOSCOPE INSERTION TUBE FROM BEING BITTEN BY THE PATIENT.

## (undated) DEVICE — LAPAROSCOPIC DISSECTOR: Brand: DEROYAL

## (undated) DEVICE — NITINOL STONE RETRIEVAL BASKET: Brand: ZERO TIP

## (undated) DEVICE — VLV SXN AIR/H2O ORCAPOD3 1P/U STRL

## (undated) DEVICE — NITINOL WIRE WITH HYDROPHILIC TIP: Brand: SENSOR

## (undated) DEVICE — ENSEAL X1 TISSUE SEALER, CURVED JAW, 45 CM SHAFT LENGTH: Brand: ENSEAL

## (undated) DEVICE — CUFF SCD HEMOFORCE SEQ CALF STD MD

## (undated) DEVICE — SOL IRR H2O BTL 1000ML STRL

## (undated) DEVICE — SHT AIR TRANSFR COMFRT GLIDE LAT 40X80IN

## (undated) DEVICE — APPL COTN TP PLSTC 6IN STRL LF PK/2

## (undated) DEVICE — TROC STANDARDTROCAR FOR/TITANSGS 19MM DISP STRL

## (undated) DEVICE — SLV SCD CALF HEMOFORCE DVT THERP REPROC MD

## (undated) DEVICE — GW AMPLTZ SUPRSTF PTFE JB .035 7X145CM

## (undated) DEVICE — URETERAL ACCESS SHEATH SET: Brand: NAVIGATOR HD

## (undated) DEVICE — DISPOSABLE MONOPOLAR ENDOSCOPIC CORD 10 FT. (3M): Brand: KIRWAN